# Patient Record
Sex: MALE | Race: WHITE | NOT HISPANIC OR LATINO | Employment: FULL TIME | URBAN - METROPOLITAN AREA
[De-identification: names, ages, dates, MRNs, and addresses within clinical notes are randomized per-mention and may not be internally consistent; named-entity substitution may affect disease eponyms.]

---

## 2017-05-09 ENCOUNTER — OFFICE VISIT (OUTPATIENT)
Dept: FAMILY MEDICINE | Facility: CLINIC | Age: 27
End: 2017-05-09
Payer: COMMERCIAL

## 2017-05-09 VITALS
HEIGHT: 71 IN | BODY MASS INDEX: 25.48 KG/M2 | RESPIRATION RATE: 16 BRPM | HEART RATE: 84 BPM | TEMPERATURE: 98.2 F | WEIGHT: 182 LBS | DIASTOLIC BLOOD PRESSURE: 80 MMHG | SYSTOLIC BLOOD PRESSURE: 120 MMHG | OXYGEN SATURATION: 98 %

## 2017-05-09 DIAGNOSIS — F90.8 ATTENTION-DEFICIT HYPERACTIVITY DISORDER, OTHER TYPE: ICD-10-CM

## 2017-05-09 DIAGNOSIS — D23.5 BENIGN NEOPLASM OF SKIN OF TRUNK, EXCEPT SCROTUM: ICD-10-CM

## 2017-05-09 DIAGNOSIS — Z00.01 ENCOUNTER FOR ROUTINE ADULT HEALTH EXAMINATION WITH ABNORMAL FINDINGS: Primary | ICD-10-CM

## 2017-05-09 PROCEDURE — 99395 PREV VISIT EST AGE 18-39: CPT | Performed by: FAMILY MEDICINE

## 2017-05-09 RX ORDER — DEXTROAMPHETAMINE SACCHARATE, AMPHETAMINE ASPARTATE, DEXTROAMPHETAMINE SULFATE AND AMPHETAMINE SULFATE 7.5; 7.5; 7.5; 7.5 MG/1; MG/1; MG/1; MG/1
30 TABLET ORAL 2 TIMES DAILY
Qty: 60 TABLET | Refills: 0 | Status: SHIPPED | OUTPATIENT
Start: 2017-05-09 | End: 2017-10-20

## 2017-05-09 RX ORDER — DEXTROAMPHETAMINE SACCHARATE, AMPHETAMINE ASPARTATE, DEXTROAMPHETAMINE SULFATE AND AMPHETAMINE SULFATE 7.5; 7.5; 7.5; 7.5 MG/1; MG/1; MG/1; MG/1
30 TABLET ORAL 2 TIMES DAILY
Qty: 60 TABLET | Refills: 0 | Status: SHIPPED | OUTPATIENT
Start: 2017-05-09 | End: 2020-12-02

## 2017-05-09 NOTE — PROGRESS NOTES
SUBJECTIVE:     CC: Roberto Spence is an 26 year old male who presents for preventative health visit.     Healthy Habits:    Do you get at least three servings of calcium containing foods daily (dairy, green leafy vegetables, etc.)? yes    Amount of exercise or daily activities, outside of work: not much day(s) per week    Problems taking medications regularly No    Medication side effects: No    Have you had an eye exam in the past two years? no    Do you see a dentist twice per year? yes    Do you have sleep apnea, excessive snoring or daytime drowsiness?no        PROBLEMS TO ADD ON...    Today's PHQ-2 Score:   PHQ-2 ( 1999 Pf[employer]) 5/25/2016 10/23/2015   Q1: Little interest or pleasure in doing things 0 0   Q2: Feeling down, depressed or hopeless 1 0   PHQ-2 Score 1 0       Abuse: Current or Past(Physical, Sexual or Emotional)- No  Do you feel safe in your environment - Yes    Social History   Substance Use Topics     Smoking status: Never Smoker     Smokeless tobacco: Never Used     Alcohol use 0.0 oz/week     0 Standard drinks or equivalent per week     The patient does not drink >3 drinks per day nor >7 drinks per week.    Last PSA: No results found for: PSA    No results for input(s): CHOL, HDL, LDL, TRIG, CHOLHDLRATIO, NHDL in the last 22497 hours.    Reviewed orders with patient. Reviewed health maintenance and updated orders accordingly - Yes    Reviewed and updated as needed this visit by clinical staff  Tobacco  Allergies  Meds  Problems  Med Hx  Soc Hx        Reviewed and updated as needed this visit by Provider  Meds  Problems            ROS:he continues to find the ADD medicine helpfulwith no real side effects, but in truth it's more that it helps his relationship with his wife as she finds him much more present and focused, and so in truth he probably takes it more for her than anything. He does notice that it helps at work but he did okay before that as well. We talked about his family  "history of heart disease, father had a heart attack in his 50s, trying to find a more physically active lifestyle with at least 10,000 steps a day. I told him he should be able to call in 3 months and get another 3 months worth of his medication and then see one of my partners in 6 months as I will be retired.  C: NEGATIVE for fever, chills, change in weight  I: NEGATIVE for worrisome rashes, moles or lesions  E: NEGATIVE for vision changes or irritation  ENT: NEGATIVE for ear, mouth and throat problems  R: NEGATIVE for significant cough or SOB  CV: NEGATIVE for chest pain, palpitations or peripheral edema  GI: NEGATIVE for nausea, abdominal pain, heartburn, or change in bowel habits   male: negative for dysuria, hematuria, decreased urinary stream, erectile dysfunction, urethral discharge  M: NEGATIVE for significant arthralgias or myalgia  N: NEGATIVE for weakness, dizziness or paresthesias  P: NEGATIVE for changes in mood or affect    Problem list, Medication list, Allergies, and Medical/Social/Surgical histories reviewed in [facility] and updated as appropriate.  OBJECTIVE:     /80 (BP Location: Left arm, Cuff Size: Adult Large)  Pulse 84  Temp 98.2  F (36.8  C) (Oral)  Resp 16  Ht 5' 11\" (1.803 m)  Wt 182 lb (82.6 kg)  SpO2 98%  BMI 25.38 kg/m2  EXAM:  GENERAL: healthy, alert and no distress  EYES: Eyes grossly normal to inspection, PERRL and conjunctivae and sclerae normal  HENT: ear canals and TM's normal, nose and mouth without ulcers or lesions  NECK: no adenopathy, no asymmetry, masses, or scars and thyroid normal to palpation  RESP: lungs clear to auscultation - no rales, rhonchi or wheezes  CV: regular rate and rhythm, normal S1 S2, no S3 or S4, no murmur, click or rub, no peripheral edema and peripheral pulses strong  ABDOMEN: soft, nontender, no hepatosplenomegaly, no masses and bowel sounds normal   (male): normal male genitalia without lesions or urethral discharge, no hernia  MS: no gross " "musculoskeletal defects noted, no edema  SKIN: no suspicious lesions or rashes BUT HE HAS A LARGE REGULAR MOLE MIDDLE BACK WHICH HIS WIFE SAYS MIGHT BE ENLARGING  NEURO: Normal strength and tone, mentation intact and speech normal  PSYCH: mentation appears normal, affect normal/bright    ASSESSMENT/PLAN:     1. Encounter for routine adult health examination with abnormal findings      2. Attention-deficit hyperactivity disorder, other type    - amphetamine-dextroamphetamine (ADDERALL) 30 MG per tablet; Take 1 tablet (30 mg) by mouth 2 times daily  Dispense: 60 tablet; Refill: 0  - amphetamine-dextroamphetamine (ADDERALL) 30 MG per tablet; Take 1 tablet (30 mg) by mouth 2 times daily  Dispense: 60 tablet; Refill: 0  - amphetamine-dextroamphetamine (ADDERALL) 30 MG per tablet; Take 1 tablet (30 mg) by mouth 2 times daily  Dispense: 60 tablet; Refill: 0    3. Benign neoplasm of skin of trunk, except scrotum    - DERMATOLOGY REFERRAL    COUNSELING:  Reviewed preventive health counseling, as reflected in patient instructions       Regular exercise       Healthy diet/nutrition         reports that he has never smoked. He has never used smokeless tobacco.    Estimated body mass index is 25.38 kg/(m^2) as calculated from the following:    Height as of this encounter: 5' 11\" (1.803 m).    Weight as of this encounter: 182 lb (82.6 kg).       Counseling Resources:  ATP IV Guidelines  Pooled Cohorts Equation Calculator  FRAX Risk Assessment  ICSI Preventive Guidelines  Dietary Guidelines for Americans, 2010  USDA's MyPlate  ASA Prophylaxis  Lung CA Screening    Wilner Jordan MD  [facility]  "

## 2017-05-09 NOTE — LETTER
My Depression Action Plan  Name: Roberto Spence   Date of Birth 1990  Date: 5/9/2017    My doctor: Wilner Jordan   My clinic: [facility]  [address] [address] [phone]          GREEN    ZONE   Good Control    What it looks like:     Things are going generally well. You have normal up s and down s. You may even feel depressed from time to time, but bad moods usually last less than a day.   What you need to do:  1. Continue to care for yourself (see self care plan)  2. Check your depression survival kit and update it as needed  3. Follow your physician s recommendations including any medication.  4. Do not stop taking medication unless you consult with your physician first.           YELLOW         ZONE Getting Worse    What it looks like:     Depression is starting to interfere with your life.     It may be hard to get out of bed; you may be starting to isolate yourself from others.    Symptoms of depression are starting to last most all day and this has happened for several days.     You may have suicidal thoughts but they are not constant.   What you need to do:     1. Call your care team, your response to treatment will improve if you keep your care team informed of your progress. Yellow periods are signs an adjustment may need to be made.     2. Continue your self-care, even if you have to fake it!    3. Talk to someone in your support network    4. Open up your depression survival kit           RED    ZONE Medical Alert - Get Help    What it looks like:     Depression is seriously interfering with your life.     You may experience these or other symptoms: You can t get out of bed most days, can t work or engage in other necessary activities, you have trouble taking care of basic hygiene, or basic responsibilities, thoughts of suicide or death that will not go away, self-injurious behavior.     What you need to do:  1. Call your care team and  request a same-day appointment. If they are not available (weekends or after hours) call your local crisis line, emergency room or 911.      Electronically signed by: Romario Berger, May 9, 2017    Depression Self Care Plan / Survival Kit    Self-Care for Depression  Here s the deal. Your body and mind are really not as separate as most people think.  What you do and think affects how you feel and how you feel influences what you do and think. This means if you do things that people who feel good do, it will help you feel better.  Sometimes this is all it takes.  There is also a place for medication and therapy depending on how severe your depression is, so be sure to consult with your medical provider and/ or Behavioral Health Consultant if your symptoms are worsening or not improving.     In order to better manage my stress, I will:    Exercise  Get some form of exercise, every day. This will help reduce pain and release endorphins, the  feel good  chemicals in your brain. This is almost as good as taking antidepressants!  This is not the same as joining a gym and then never going! (they count on that by the way ) It can be as simple as just going for a walk or doing some gardening, anything that will get you moving.      Hygiene   Maintain good hygiene (Get out of bed in the morning, Make your bed, Brush your teeth, Take a shower, and Get dressed like you were going to work, even if you are unemployed).  If your clothes don't fit try to get ones that do.    Diet  I will strive to eat foods that are good for me, drink plenty of water, and avoid excessive sugar, caffeine, alcohol, and other mood-altering substances.  Some foods that are helpful in depression are: complex carbohydrates, B vitamins, flaxseed, fish or fish oil, fresh fruits and vegetables.    Psychotherapy  I agree to participate in Individual Therapy (if recommended).    Medication  If prescribed medications, I agree to take them.  Missing doses  can result in serious side effects.  I understand that drinking alcohol, or other illicit drug use, may cause potential side effects.  I will not stop my medication abruptly without first discussing it with my provider.    Staying Connected With Others  I will stay in touch with my friends, family members, and my primary care provider/team.    Use your imagination  Be creative.  We all have a creative side; it doesn t matter if it s oil painting, sand castles, or mud pies! This will also kick up the endorphins.    Witness Beauty  (AKA stop and smell the roses) Take a look outside, even in mid-winter. Notice colors, textures. Watch the squirrels and birds.     Service to others  Be of service to others.  There is always someone else in need.  By helping others we can  get out of ourselves  and remember the really important things.  This also provides opportunities for practicing all the other parts of the program.    Humor  Laugh and be silly!  Adjust your TV habits for less news and crime-drama and more comedy.    Control your stress  Try breathing deep, massage therapy, biofeedback, and meditation. Find time to relax each day.     My support system    Clinic Contact:  Phone number:    Contact 1:  Phone number:    Contact 2:  Phone number:    Muslim/:  Phone number:    Therapist:  Phone number:    Local crisis center:    Phone number:    Other community support:  Phone number:

## 2017-05-09 NOTE — NURSING NOTE
"Chief Complaint   Patient presents with     Physical     A.D.H.D     initial /80 (BP Location: Left arm, Cuff Size: Adult Large)  Pulse 84  Temp 98.2  F (36.8  C) (Oral)  Resp 16  Ht 5' 11\" (1.803 m)  Wt 182 lb (82.6 kg)  SpO2 98%  BMI 25.38 kg/m2 Estimated body mass index is 25.38 kg/(m^2) as calculated from the following:    Height as of this encounter: 5' 11\" (1.803 m).    Weight as of this encounter: 182 lb (82.6 kg).  BP completed using cuff size: large.  L arm      Health Maintenance that is potentially due pending provider review:  NONE    n/a    Romario Berger ma  "

## 2017-05-09 NOTE — MR AVS SNAPSHOT
After Visit Summary   5/9/2017    Roberto Spence    MRN: [id number]           Patient Information     Date Of Birth          1990        Visit Information        Provider Department      5/9/2017 6:30 PM Wilner Jordan MD [facility]        Today's Diagnoses     Encounter for routine adult health examination with abnormal findings    -  1    Attention-deficit hyperactivity disorder, other type        Benign neoplasm of skin of trunk, except scrotum          Care Instructions      Preventive Health Recommendations  Male Ages 26 - 39    Yearly exam:             See your health care provider every year in order to  o   Review health changes.   o   Discuss preventive care.    o   Review your medicines if your doctor has prescribed any.    You should be tested each year for STDs (sexually transmitted diseases), if you re at risk.     After age 35, talk to your provider about cholesterol testing. If you are at risk for heart disease, have your cholesterol tested at least every 5 years.     If you are at risk for diabetes, you should have a diabetes test (fasting glucose).  Shots: Get a flu shot each year. Get a tetanus shot every 10 years.     Nutrition:    Eat at least 5 servings of fruits and vegetables daily.     Eat whole-grain bread, whole-wheat pasta and brown rice instead of white grains and rice.     Talk to your provider about Calcium and Vitamin D.     Lifestyle    Exercise for at least 150 minutes a week (30 minutes a day, 5 days a week). This will help you control your weight and prevent disease.     Limit alcohol to one drink per day.     No smoking.     Wear sunscreen to prevent skin cancer.     See your dentist every six months for an exam and cleaning.           Follow-ups after your visit        Additional Services     DERMATOLOGY REFERRAL       Your provider has referred you to: [facility] [phone]    [email or web address]    Please be aware that coverage of these services is subject to the terms and limitations of your health insurance plan.  Call member services at your health plan with any benefit or coverage questions.      Please bring the following with you to your appointment:    (1) Any X-Rays, CTs or MRIs which have been performed.  Contact the facility where they were done to arrange for  prior to your scheduled appointment.    (2) List of current medications  (3) This referral request   (4) Any documents/labs given to you for this referral                  Who to contact     If you have questions or need follow up information about today's clinic visit or your schedule please contact [facility] directly at [phone].  Normal or non-critical lab and imaging results will be communicated to you by MyChart, letter or phone within 4 business days after the clinic has received the results. If you do not hear from us within 7 days, please contact the clinic through [employer] or phone. If you have a critical or abnormal lab result, we will notify you by phone as soon as possible.  Submit refill requests through [employer] or call your pharmacy and they will forward the refill request to us. Please allow 3 business days for your refill to be completed.          Additional Information About Your Visit        [employer] Information     [employer] gives you secure access to your electronic health record. If you see a primary care provider, you can also send messages to your care team and make appointments. If you have questions, please call your primary care clinic.  If you do not have a primary care provider, please call [phone] and they will assist you.        Care EveryWhere ID     This is your Care EveryWhere ID. This could be used by other organizations to access your [facility] medical records  [id number]        Your Vitals Were     Pulse Temperature Respirations Height Pulse  "Oximetry BMI (Body Mass Index)    84 98.2  F (36.8  C) (Oral) 16 5' 11\" (1.803 m) 98% 25.38 kg/m2       Blood Pressure from Last 3 Encounters:   05/09/17 120/80   11/16/16 138/82   08/09/16 120/80    Weight from Last 3 Encounters:   05/09/17 182 lb (82.6 kg)   11/16/16 181 lb 12.8 oz (82.5 kg)   08/09/16 175 lb (79.4 kg)              We Performed the Following     DERMATOLOGY REFERRAL          Today's Medication Changes          These changes are accurate as of: 5/9/17  6:59 PM.  If you have any questions, ask your nurse or doctor.               These medicines have changed or have updated prescriptions.        Dose/Directions    * amphetamine-dextroamphetamine 30 MG per tablet   Commonly known as:  ADDERALL   This may have changed:  Another medication with the same name was removed. Continue taking this medication, and follow the directions you see here.   Used for:  Attention-deficit hyperactivity disorder, other type   Changed by:  Wilner Jordan MD        Dose:  30 mg   Take 1 tablet (30 mg) by mouth 2 times daily   Quantity:  60 tablet   Refills:  0       * amphetamine-dextroamphetamine 30 MG per tablet   Commonly known as:  ADDERALL   This may have changed:  Another medication with the same name was removed. Continue taking this medication, and follow the directions you see here.   Used for:  Attention-deficit hyperactivity disorder, other type   Changed by:  Wilner Jordan MD        Dose:  30 mg   Take 1 tablet (30 mg) by mouth 2 times daily   Quantity:  60 tablet   Refills:  0       * amphetamine-dextroamphetamine 30 MG per tablet   Commonly known as:  ADDERALL   This may have changed:  Another medication with the same name was removed. Continue taking this medication, and follow the directions you see here.   Used for:  Attention-deficit hyperactivity disorder, other type   Changed by:  Wilner Jordan MD        Dose:  30 mg   Take 1 tablet (30 mg) by mouth 2 times daily   Quantity:  60 tablet "   Refills:  0       * Notice:  This list has 3 medication(s) that are the same as other medications prescribed for you. Read the directions carefully, and ask your doctor or other care provider to review them with you.         Where to get your medicines      Some of these will need a paper prescription and others can be bought over the counter.  Ask your nurse if you have questions.     Bring a paper prescription for each of these medications     amphetamine-dextroamphetamine 30 MG per tablet    amphetamine-dextroamphetamine 30 MG per tablet    amphetamine-dextroamphetamine 30 MG per tablet                Primary Care Provider Office Phone # Fax #    Wilner Jordan [phone]       [facility] [address] [address]        Thank you!     Thank you for choosing [facility]  for your care. Our goal is always to provide you with excellent care. Hearing back from our patients is one way we can continue to improve our services. Please take a few minutes to complete the written survey that you may receive in the mail after your visit with us. Thank you!             Your Updated Medication List - Protect others around you: Learn how to safely use, store and throw away your medicines at www.disposemymeds.org.          This list is accurate as of: 5/9/17  6:59 PM.  Always use your most recent med list.                   Brand Name Dispense Instructions for use    * amphetamine-dextroamphetamine 30 MG per tablet    ADDERALL    60 tablet    Take 1 tablet (30 mg) by mouth 2 times daily       * amphetamine-dextroamphetamine 30 MG per tablet    ADDERALL    60 tablet    Take 1 tablet (30 mg) by mouth 2 times daily       * amphetamine-dextroamphetamine 30 MG per tablet    ADDERALL    60 tablet    Take 1 tablet (30 mg) by mouth 2 times daily       buPROPion 300 MG 24 hr tablet    WELLBUTRIN XL    90 tablet    Take 1 tablet (300 mg) by mouth every morning       *  Notice:  This list has 3 medication(s) that are the same as other medications prescribed for you. Read the directions carefully, and ask your doctor or other care provider to review them with you.

## 2017-05-10 ASSESSMENT — PATIENT HEALTH QUESTIONNAIRE - PHQ9: SUM OF ALL RESPONSES TO PHQ QUESTIONS 1-9: 1

## 2017-10-20 ENCOUNTER — HOSPITAL ENCOUNTER (INPATIENT)
Facility: CLINIC | Age: 27
LOS: 2 days | Discharge: HOME | DRG: 813 | End: 2017-10-22
Attending: EMERGENCY MEDICINE | Admitting: INTERNAL MEDICINE
Payer: COMMERCIAL

## 2017-10-20 DIAGNOSIS — K90.0 CELIAC DISEASE: ICD-10-CM

## 2017-10-20 DIAGNOSIS — D69.6 THROMBOCYTOPENIA (H): ICD-10-CM

## 2017-10-20 LAB
ABO + RH BLD: NORMAL
ABO + RH BLD: NORMAL
ALBUMIN SERPL-MCNC: 4.2 G/DL (ref 3.4–5)
ALP SERPL-CCNC: 93 U/L (ref 40–150)
ALT SERPL W P-5'-P-CCNC: 112 U/L (ref 0–70)
ANION GAP SERPL CALCULATED.3IONS-SCNC: 7 MMOL/L (ref 3–14)
APTT PPP: 26 SEC (ref 22–37)
AST SERPL W P-5'-P-CCNC: 58 U/L (ref 0–45)
BASOPHILS # BLD AUTO: 0 10E9/L (ref 0–0.2)
BASOPHILS NFR BLD AUTO: 0.9 %
BILIRUB SERPL-MCNC: 0.5 MG/DL (ref 0.2–1.3)
BLD GP AB SCN SERPL QL: NORMAL
BLOOD BANK CMNT PATIENT-IMP: NORMAL
BUN SERPL-MCNC: 15 MG/DL (ref 7–30)
CALCIUM SERPL-MCNC: 8.8 MG/DL (ref 8.5–10.1)
CHLORIDE SERPL-SCNC: 107 MMOL/L (ref 94–109)
CO2 SERPL-SCNC: 27 MMOL/L (ref 20–32)
CREAT SERPL-MCNC: 0.94 MG/DL (ref 0.66–1.25)
CRP SERPL-MCNC: 9.9 MG/L (ref 0–8)
D DIMER PPP FEU-MCNC: <0.3 UG/ML FEU (ref 0–0.5)
DIFFERENTIAL METHOD BLD: ABNORMAL
EOSINOPHIL # BLD AUTO: 0.2 10E9/L (ref 0–0.7)
EOSINOPHIL NFR BLD AUTO: 4.3 %
ERYTHROCYTE [DISTWIDTH] IN BLOOD BY AUTOMATED COUNT: 13.5 % (ref 10–15)
ERYTHROCYTE [SEDIMENTATION RATE] IN BLOOD BY WESTERGREN METHOD: 1 MM/H (ref 0–15)
FIBRINOGEN PPP-MCNC: 285 MG/DL (ref 200–420)
GFR SERPL CREATININE-BSD FRML MDRD: >90 ML/MIN/1.7M2
GLUCOSE SERPL-MCNC: 102 MG/DL (ref 70–99)
HCT VFR BLD AUTO: 48.1 % (ref 40–53)
HGB BLD-MCNC: 16.5 G/DL (ref 13.3–17.7)
IMM GRANULOCYTES # BLD: 0 10E9/L (ref 0–0.4)
IMM GRANULOCYTES NFR BLD: 0.9 %
INR PPP: 0.92 (ref 0.86–1.14)
LYMPHOCYTES # BLD AUTO: 1.7 10E9/L (ref 0.8–5.3)
LYMPHOCYTES NFR BLD AUTO: 38.6 %
MCH RBC QN AUTO: 29.8 PG (ref 26.5–33)
MCHC RBC AUTO-ENTMCNC: 34.3 G/DL (ref 31.5–36.5)
MCV RBC AUTO: 87 FL (ref 78–100)
MONOCYTES # BLD AUTO: 0.6 10E9/L (ref 0–1.3)
MONOCYTES NFR BLD AUTO: 12.4 %
NEUTROPHILS # BLD AUTO: 1.9 10E9/L (ref 1.6–8.3)
NEUTROPHILS NFR BLD AUTO: 42.9 %
NRBC # BLD AUTO: ABNORMAL 10*3/UL
NRBC BLD AUTO-RTO: ABNORMAL /100
PLATELET # BLD AUTO: 1 10E9/L (ref 150–450)
PLATELET # BLD EST: ABNORMAL 10*3/UL
POTASSIUM SERPL-SCNC: 3.9 MMOL/L (ref 3.4–5.3)
PROT SERPL-MCNC: 6.9 G/DL (ref 6.8–8.8)
RBC # BLD AUTO: 5.53 10E12/L (ref 4.4–5.9)
RETICS # AUTO: 95.1 10E9/L (ref 25–95)
RETICS/RBC NFR AUTO: 1.7 % (ref 0.5–2)
SODIUM SERPL-SCNC: 141 MMOL/L (ref 133–144)
SPECIMEN EXP DATE BLD: NORMAL
WBC # BLD AUTO: 4.4 10E9/L (ref 4–11)

## 2017-10-20 PROCEDURE — 85384 FIBRINOGEN ACTIVITY: CPT | Performed by: EMERGENCY MEDICINE

## 2017-10-20 PROCEDURE — 40000611 ZZHCL STATISTIC MORPHOLOGY W/INTERP HEMEPATH TC 85060: Performed by: EMERGENCY MEDICINE

## 2017-10-20 PROCEDURE — 25000125 ZZHC RX 250: Performed by: EMERGENCY MEDICINE

## 2017-10-20 PROCEDURE — 85045 AUTOMATED RETICULOCYTE COUNT: CPT | Performed by: EMERGENCY MEDICINE

## 2017-10-20 PROCEDURE — 85730 THROMBOPLASTIN TIME PARTIAL: CPT | Performed by: EMERGENCY MEDICINE

## 2017-10-20 PROCEDURE — 86140 C-REACTIVE PROTEIN: CPT | Performed by: EMERGENCY MEDICINE

## 2017-10-20 PROCEDURE — 99285 EMERGENCY DEPT VISIT HI MDM: CPT | Mod: 25 | Performed by: EMERGENCY MEDICINE

## 2017-10-20 PROCEDURE — 30233S1 TRANSFUSION OF NONAUTOLOGOUS GLOBULIN INTO PERIPHERAL VEIN, PERCUTANEOUS APPROACH: ICD-10-PCS | Performed by: INTERNAL MEDICINE

## 2017-10-20 PROCEDURE — 86900 BLOOD TYPING SEROLOGIC ABO: CPT | Performed by: EMERGENCY MEDICINE

## 2017-10-20 PROCEDURE — 85379 FIBRIN DEGRADATION QUANT: CPT | Performed by: EMERGENCY MEDICINE

## 2017-10-20 PROCEDURE — 99222 1ST HOSP IP/OBS MODERATE 55: CPT | Mod: AI | Performed by: INTERNAL MEDICINE

## 2017-10-20 PROCEDURE — 80053 COMPREHEN METABOLIC PANEL: CPT | Performed by: EMERGENCY MEDICINE

## 2017-10-20 PROCEDURE — 12000008 ZZH R&B INTERMEDIATE UMMC

## 2017-10-20 PROCEDURE — 86850 RBC ANTIBODY SCREEN: CPT | Performed by: EMERGENCY MEDICINE

## 2017-10-20 PROCEDURE — 85652 RBC SED RATE AUTOMATED: CPT | Performed by: EMERGENCY MEDICINE

## 2017-10-20 PROCEDURE — 85025 COMPLETE CBC W/AUTO DIFF WBC: CPT | Performed by: EMERGENCY MEDICINE

## 2017-10-20 PROCEDURE — 85610 PROTHROMBIN TIME: CPT | Performed by: EMERGENCY MEDICINE

## 2017-10-20 PROCEDURE — 96374 THER/PROPH/DIAG INJ IV PUSH: CPT | Performed by: EMERGENCY MEDICINE

## 2017-10-20 PROCEDURE — 99285 EMERGENCY DEPT VISIT HI MDM: CPT | Mod: Z6 | Performed by: EMERGENCY MEDICINE

## 2017-10-20 PROCEDURE — 86901 BLOOD TYPING SEROLOGIC RH(D): CPT | Performed by: EMERGENCY MEDICINE

## 2017-10-20 RX ORDER — PREDNISONE 20 MG/1
80 TABLET ORAL ONCE
Status: COMPLETED | OUTPATIENT
Start: 2017-10-20 | End: 2017-10-20

## 2017-10-20 RX ORDER — PANTOPRAZOLE SODIUM 40 MG/1
40 TABLET, DELAYED RELEASE ORAL EVERY MORNING
Status: DISCONTINUED | OUTPATIENT
Start: 2017-10-21 | End: 2017-10-22 | Stop reason: HOSPADM

## 2017-10-20 RX ORDER — BUPROPION HYDROCHLORIDE 300 MG/1
300 TABLET ORAL EVERY MORNING
Status: DISCONTINUED | OUTPATIENT
Start: 2017-10-21 | End: 2017-10-22 | Stop reason: HOSPADM

## 2017-10-20 RX ORDER — ACETAMINOPHEN 325 MG/1
650 TABLET ORAL EVERY 4 HOURS PRN
Status: DISCONTINUED | OUTPATIENT
Start: 2017-10-20 | End: 2017-10-22 | Stop reason: HOSPADM

## 2017-10-20 RX ADMIN — PANTOPRAZOLE SODIUM 40 MG: 40 INJECTION, POWDER, FOR SOLUTION INTRAVENOUS at 19:20

## 2017-10-20 RX ADMIN — PREDNISONE 80 MG: 20 TABLET ORAL at 19:19

## 2017-10-20 ASSESSMENT — ENCOUNTER SYMPTOMS
ABDOMINAL PAIN: 0
NAUSEA: 0
CONSTIPATION: 0
CHILLS: 0
BLOOD IN STOOL: 0
FEVER: 0
VOMITING: 0
DIARRHEA: 0

## 2017-10-20 NOTE — ED PROVIDER NOTES
History     Chief Complaint   Patient presents with     Abnormal Labs     HPI  Roberto Spence is a 27 year old male with a history of celiac disease who presents to the ED from clinic for evaluation of abnormal labs, having been found to have a PLT count of 3 at clinic today.    Patient reports having a 2 day history of bloody/oozing sores in his mouth and then petechiae developing on his extremities throughout the day today, first the lower extremities, then the upper extremities. Has never had anything like this before. Has had PLT level of 50-80's here recently, but has been uptrending/improving. Denies family history of any clotting/bleeding disorders.    Denies fevers, chills, and abdominal pain. Denies urinary symptoms and/or change in bowels. Denies nausea, vomiting. Denies recent medication changes. Denies usage of alcohol and marijuana, no drugs. No falls or traumas. No HAs or neuro symptoms. No CP or shortness of breath. No recent illnesses. No viral syndromes/diagnoses. No other new symptoms or complaints at this time.       Patient's PCP is Wilner Feliz family medicine MD at [facility].    I have reviewed the Medications, Allergies, Past Medical and Surgical History, and Social History in the [facility] system.    Past Medical History:   Diagnosis Date     Celiac disease 06/10/2015     Platelets decreased (H) 06/10/2015    lower than average per patient report       No past surgical history on file.    Family History   Problem Relation Age of Onset     Coronary Artery Disease Father      Thyroid Disease Father      Known Genetic Syndrome Brother      autisism     Chemical Addiction Paternal Grandfather      Family History Negative Mother      DIABETES No family hx of      Hypertension No family hx of      Hyperlipidemia No family hx of      CEREBROVASCULAR DISEASE No family hx of      Breast Cancer No family hx of      Colon Cancer No family hx of      Prostate Cancer No family hx of       "Other Cancer No family hx of      Depression No family hx of      Anxiety Disorder No family hx of      MENTAL ILLNESS No family hx of      Substance Abuse No family hx of      Anesthesia Reaction No family hx of      Asthma No family hx of      OSTEOPOROSIS No family hx of      Genetic Disorder No family hx of      Obesity No family hx of      Unknown/Adopted No family hx of        Social History   Substance Use Topics     Smoking status: Never Smoker     Smokeless tobacco: Never Used     Alcohol use 0.0 oz/week     0 Standard drinks or equivalent per week       No current facility-administered medications for this encounter.      Current Outpatient Prescriptions   Medication     amphetamine-dextroamphetamine (ADDERALL) 30 MG per tablet     buPROPion (WELLBUTRIN XL) 300 MG 24 hr tablet          Allergies   Allergen Reactions     No Known Allergies      Nsaids Unknown     Has low platelets       Review of Systems   Constitutional: Negative for chills and fever.   HENT: Positive for mouth sores and nosebleeds. Negative for sore throat and trouble swallowing.    Respiratory: Negative for cough and shortness of breath.    Gastrointestinal: Negative for abdominal pain, blood in stool, constipation, diarrhea, nausea and vomiting.   Genitourinary: Negative for dysuria, frequency and hematuria.   Musculoskeletal: Negative for arthralgias and myalgias.   Skin: Positive for rash (petechiae) and wound (oozing mouth sores).   Allergic/Immunologic: Negative for immunocompromised state.   Neurological: Negative for weakness, light-headedness, numbness and headaches.   Hematological: Negative for adenopathy.   All other systems reviewed and are negative.      Physical Exam   BP: (!) 131/100  Pulse: 85  Temp: 98.1  F (36.7  C)  Resp: 16  Height: 180.3 cm (5' 11\")  Weight: 78.5 kg (173 lb)  SpO2: 97 %      Physical Exam  CONSTITUTIONAL: Well-developed and well-nourished. Awake and alert. Non-toxic appearance. No acute distress. "   HENT:   - Head: Normocephalic and atraumatic.   - Ears: Hearing and external ear grossly normal.   - Nose: Nose normal. No rhinorrhea. No active epistaxis.   - Mouth/Throat: Oropharynx is clear and MMM  EYES: Conjunctivae and lids are normal. No scleral icterus.   NECK: Normal range of motion and phonation normal. Neck supple.  No tracheal deviation, no stridor. No edema or erythema noted.  CARDIOVASCULAR: Normal rate, regular rhythm and no appreciable abnormal heart sounds.  PULMONARY/CHEST: Effort normal. No accessory muscle usage or stridor. No respiratory distress.  No appreciable abnormal breath sounds.  ABDOMEN: Soft, non-distended. No tenderness. No rigidity, rebound or guarding.   MUSCULOSKELETAL: Extremities warm and seemingly well perfused. No edema or calf tenderness.  NEUROLOGIC: Awake, alert. Not disoriented. He displays no atrophy and no tremor. Normal tone. No seizure activity. Coordination normal. GCS 15  SKIN: Skin is warm and dry. Peripheral petechiae w/o purpura  (LE > RE). No diaphoresis. No pallor. No skin sloughing. No bullae, no raised lesions  PSYCHIATRIC: Normal mood and affect. Speech and behavior normal. Thought processes linear. Cognition and memory are normal.     ED Course     ED Course   Comment Time   Called Elisabet, awaiting call back 10/20 1821   Discussed with Heme fellow. They will discuss w/ their staff and call us back. They are having us hold off on transfusing platelets currently. 10/20 1833   I was called back by Hematology.  They have discussed with her staff.  They have asked us again to please not give platelets.  But to start high-dose steroids (80 mg prednisone orally), and give a dose of PPI. 10/20 1902     Procedures               Labs Ordered and Resulted from Time of ED Arrival Up to the Time of Departure from the ED - No data to display         Assessments & Plan (with Medical Decision Making)   IMPRESSION:   27-year-old, generally healthy male outside of some celiac  disease and some nonspecific midrange thrombocytopenia (50-80s), presents today after having been found to have incredibly low platelets at OSH clinic (platelet level III), with preceding symptoms of bleeding mouth wounds, mild epistaxis and peripheral petechia over the last 2 days. Clinically, patient appears nontoxic, NAD.  Vitals within normal limits.  He does have petechia on exam, worse over the distal lower extremities, less so over the distal upper extremities. No obvious active bleeding currently.    DDx for severe thrombocytopenia, includes ITP, TTP, less likely HUS. Not on heparin, no clear viral syndrome of symptoms or known hx.     PLAN: We will obtain some repeat blood studies here including a peripheral smear.  I will discuss this with Hematology and I have already called them and I am awaiting a call back for additional assistance.  - Will discuss whether or not pt would need to be transfused platelets or if they would like us to hold off on transfusing, and instead use steroids IVIG.    RESULTS:  - Labs: platelet count 1, CRP 9.9, , AST 58, % reticulocyte count 1.7, absolute reticulocyte count 95.1. INR normal at 0.92, PTT 26, fibrinogen activity pending  --- See ED Course section above for particular pertinent findings and comments  - Urine: No sample yet provided    INTERVENTIONS:   - PO prednisone (80 mg x1), dosing and routes recommended by Hematology. They did not want us to be use IV steroids in my discussions with them. They again reiterated that they do not want us transfusing platelets. They will place other treatment orders as needed, such as IVIG, etc.  - IV Protonix     DISCUSSIONS:  - w/ Hem-Onc: See above. They will admit, having us start on PO steroids and IV PPI, and they will place the rest of the orders.   - w/ Patient: I have reviewed the available findings, plan with the patient. He expressed understanding and agreement with this plan. All questions answered to the best of  our ability at this time.     DISPOSITION/PLANNING:  - FINAL IMPRESSION: Severe thrombocytopenia  (Exact etiology TBD)  - DISPOSITION: Admit to Hematology service for further evaluation/management.  --- Pending: Blood morphology/peripheral smear, fibrinogen activity, d-dimer, ESR, further recommendations from Hematology        ______________________________________________________________________________    - I have reviewed the available nursing notes.            New Prescriptions    No medications on file       Final diagnoses:   None   Lizzy BILLINGSLEY, am serving as a trained medical scribe to document services personally performed by Astrid Robbins MD, based on the provider's statements to me.    Astrid BILLINGSLEY MD, was physically present and have reviewed and verified the accuracy of this note documented by Lizzy Cohen.     10/20/2017   [facility], [place], EMERGENCY DEPARTMENT     Astrid Robbins MD  10/22/17 0024

## 2017-10-20 NOTE — IP AVS SNAPSHOT
MRN:[id number]                      After Visit Summary   10/20/2017    Roberto Spence    MRN: [id number]           Thank you!     Thank you for choosing [facility] for your care. Our goal is always to provide you with excellent care. Hearing back from our patients is one way we can continue to improve our services. Please take a few minutes to complete the written survey that you may receive in the mail after you visit with us. Thank you!        Patient Information     Date Of Birth          1990        Designated Caregiver       Most Recent Value    Caregiver    Will someone help with your care after discharge? yes    Name of designated caregiver Shellye    Phone number of caregiver [phone]    Caregiver address [facility]      About your hospital stay     You were admitted on:  October 20, 2017 You last received care in the:  [address] [facility] [facility]    You were discharged on:  October 22, 2017        Reason for your hospital stay       You were hospitalized for thrombocytopenia and petechiae.                  Who to Call     For medical emergencies, please call 911.  For non-urgent questions about your medical care, please call your primary care provider or clinic, [phone]          Attending Provider     Provider Specialty    Astrid Robbins MD Emergency Medicine    [facility]       Primary Care Provider Office Phone # Fax #    Wilner Feliz [phone]       When to contact your care team       Bloody stool or black stool.   Significant worsening bleeding  Headache                  After Care Instructions     Activity       Your activity upon discharge: would avoid significant physical activity particularly involving contact until platelet count is above 50.            Diet       Follow this diet upon discharge: regular            Discharge Instructions       Follow up appointment 10/31 and check-in time with Alka Benson from hematology  at 2:25 pm. She is very good. She is one of the physician assistants who is very good.    We would like have you get you labs checked twice next week. If you can get your labs checked on Monday and Thursday or Friday. You can have them forwarded to [facility] fax: [phone]. You can have as attention to Alka Benson and Dr. Wright.     [facility] main line: [phone]  [facility] triage: [phone] (available 24/7)    Alka Benson and Dr. Wright will determine taper of your steroids. I will send you out with 80 mg daily. We will send you out with prilosec to prevent stomach irritation on steroids.                  Follow-up Appointments     Adult [facility] Follow-up and recommended labs and tests       Follow up appointment 10/31 and check-in time with Alka Benson from hematology at 2:25 pm. CBC with differential and BMP.     Appointments on [place] and/or [facility] (with [facility] or [facility] provider or service). Call [phone] if you haven't heard regarding these appointments within 7 days of discharge.                  Your next 10 appointments already scheduled     Oct 31, 2017  2:40 PM CDT   (Arrive by 2:25 PM)   Return Visit with SIENNA Keys   [facility] ([facility])    [address] [place] [id number] [phone]              Pending Results     Date and Time Order Name Status Description    10/21/2017 0717 Platelets prepare order unit conditional In process     10/21/2017 0604 Hepatitis B surface antigen In process     10/21/2017 0604 Hepatitis B core antibody In process     10/21/2017 0604 Hepatitis A antibody IgM In process     10/21/2017 0604 Hepatitis B Surface Antibody In process     10/21/2017 0604 Immunoglobulins A G and M In process     10/20/2017 2141 Hepatits C antibody In process     10/20/2017 2141 HIV Antigen Antibody Combo In process     10/20/2017 1818 Blood Morphology Pathologist Review  "In process             Statement of Approval     Ordered          10/22/17 1247  I have reviewed and agree with all the recommendations and orders detailed in this document.  EFFECTIVE NOW     Approved and electronically signed by:  Wilner Monteiro MD             Admission Information     Date & Time Provider Department Dept. Phone    10/20/2017 Carie Burden MD [address] [facility] [phone]      Your Vitals Were     Blood Pressure Pulse Temperature Respirations Height Weight    147/79 (BP Location: Left arm) 79 97.6  F (36.4  C) (Oral) 16 1.803 m (5' 11\") 78.2 kg (172 lb 6.4 oz)    Pulse Oximetry BMI (Body Mass Index)                99% 24.04 kg/m2          MyChart Information     [employer] gives you secure access to your electronic health record. If you see a primary care provider, you can also send messages to your care team and make appointments. If you have questions, please call your primary care clinic.  If you do not have a primary care provider, please call [phone] and they will assist you.        Care EveryWhere ID     This is your Care EveryWhere ID. This could be used by other organizations to access your [facility] medical records  [id number]        Equal Access to Services     BARBARA SOLER AH: Hadii corey De Leon, wabroda marshal, qaybta kaalmada randolph, delisa charles. So [facility] [phone].    ATENCIÓN: Si habla español, tiene a khan disposición servicios gratuitos de asistencia lingüística. Nargis al [phone].    We comply with applicable federal civil rights laws and [place] laws. We do not discriminate on the basis of race, color, national origin, age, disability, sex, sexual orientation, or gender identity.               Review of your medicines      START taking        Dose / Directions    omeprazole 20 MG CR capsule   Commonly known as:  priLOSEC   Used for:  Thrombocytopenia (H)        Dose:  20 mg   Take 1 capsule (20 mg) by mouth daily "   Quantity:  15 capsule   Refills:  3       predniSONE 20 MG tablet   Commonly known as:  DELTASONE   Used for:  Thrombocytopenia (H)        Dose:  80 mg   Start taking on:  10/23/2017   Take 4 tablets (80 mg) by mouth daily   Quantity:  50 tablet   Refills:  0         CONTINUE these medicines which have NOT CHANGED        Dose / Directions    amphetamine-dextroamphetamine 30 MG per tablet   Commonly known as:  ADDERALL   Used for:  Attention-deficit hyperactivity disorder, other type        Dose:  30 mg   Take 1 tablet (30 mg) by mouth 2 times daily   Quantity:  60 tablet   Refills:  0       buPROPion 300 MG 24 hr tablet   Commonly known as:  WELLBUTRIN XL   Used for:  Major depressive disorder, single episode, mild (H)        Dose:  300 mg   Take 1 tablet (300 mg) by mouth every morning   Quantity:  90 tablet   Refills:  3            Where to get your medicines      These medications were sent to [place] [id number] [place] [address]  [address] [facility] [address]     Phone:  [phone]     omeprazole 20 MG CR capsule    predniSONE 20 MG tablet                Protect others around you: Learn how to safely use, store and throw away your medicines at www.disposemymeds.org.             Medication List: This is a list of all your medications and when to take them. Check marks below indicate your daily home schedule. Keep this list as a reference.      Medications           Morning Afternoon Evening Bedtime As Needed    amphetamine-dextroamphetamine 30 MG per tablet   Commonly known as:  ADDERALL   Take 1 tablet (30 mg) by mouth 2 times daily                                buPROPion 300 MG 24 hr tablet   Commonly known as:  WELLBUTRIN XL   Take 1 tablet (300 mg) by mouth every morning   Last time this was given:  300 mg on 10/22/2017  7:50 AM                                omeprazole 20 MG CR capsule   Commonly known as:  priLOSEC   Take 1 capsule (20 mg) by mouth daily                                 predniSONE 20 MG tablet   Commonly known as:  DELTASONE   Take 4 tablets (80 mg) by mouth daily   Start taking on:  10/23/2017   Last time this was given:  80 mg on 10/22/2017 11:42 AM

## 2017-10-21 ENCOUNTER — APPOINTMENT (OUTPATIENT)
Dept: CT IMAGING | Facility: CLINIC | Age: 27
DRG: 813 | End: 2017-10-21
Attending: INTERNAL MEDICINE
Payer: COMMERCIAL

## 2017-10-21 LAB
BASOPHILS # BLD AUTO: 0 10E9/L (ref 0–0.2)
BASOPHILS NFR BLD AUTO: 0 %
DIFFERENTIAL METHOD BLD: ABNORMAL
EOSINOPHIL # BLD AUTO: 0 10E9/L (ref 0–0.7)
EOSINOPHIL NFR BLD AUTO: 0 %
ERYTHROCYTE [DISTWIDTH] IN BLOOD BY AUTOMATED COUNT: 13.2 % (ref 10–15)
HCT VFR BLD AUTO: 46.5 % (ref 40–53)
HGB BLD-MCNC: 15.9 G/DL (ref 13.3–17.7)
IMM GRANULOCYTES # BLD: 0 10E9/L (ref 0–0.4)
IMM GRANULOCYTES NFR BLD: 0.4 %
LYMPHOCYTES # BLD AUTO: 1.3 10E9/L (ref 0.8–5.3)
LYMPHOCYTES NFR BLD AUTO: 24.2 %
MCH RBC QN AUTO: 29.2 PG (ref 26.5–33)
MCHC RBC AUTO-ENTMCNC: 34.2 G/DL (ref 31.5–36.5)
MCV RBC AUTO: 85 FL (ref 78–100)
MONOCYTES # BLD AUTO: 0.4 10E9/L (ref 0–1.3)
MONOCYTES NFR BLD AUTO: 8.2 %
NEUTROPHILS # BLD AUTO: 3.5 10E9/L (ref 1.6–8.3)
NEUTROPHILS NFR BLD AUTO: 67.2 %
NRBC # BLD AUTO: 0 10*3/UL
NRBC BLD AUTO-RTO: 0 /100
PLATELET # BLD AUTO: 1 10E9/L (ref 150–450)
RBC # BLD AUTO: 5.45 10E12/L (ref 4.4–5.9)
WBC # BLD AUTO: 5.2 10E9/L (ref 4–11)

## 2017-10-21 PROCEDURE — 86706 HEP B SURFACE ANTIBODY: CPT | Performed by: INTERNAL MEDICINE

## 2017-10-21 PROCEDURE — 82784 ASSAY IGA/IGD/IGG/IGM EACH: CPT | Performed by: INTERNAL MEDICINE

## 2017-10-21 PROCEDURE — 25000132 ZZH RX MED GY IP 250 OP 250 PS 637: Performed by: INTERNAL MEDICINE

## 2017-10-21 PROCEDURE — 86709 HEPATITIS A IGM ANTIBODY: CPT | Performed by: INTERNAL MEDICINE

## 2017-10-21 PROCEDURE — 87340 HEPATITIS B SURFACE AG IA: CPT | Performed by: INTERNAL MEDICINE

## 2017-10-21 PROCEDURE — 70450 CT HEAD/BRAIN W/O DYE: CPT

## 2017-10-21 PROCEDURE — 86803 HEPATITIS C AB TEST: CPT | Performed by: INTERNAL MEDICINE

## 2017-10-21 PROCEDURE — 36415 COLL VENOUS BLD VENIPUNCTURE: CPT | Performed by: INTERNAL MEDICINE

## 2017-10-21 PROCEDURE — 25000131 ZZH RX MED GY IP 250 OP 636 PS 637: Performed by: PHYSICIAN ASSISTANT

## 2017-10-21 PROCEDURE — 25000128 H RX IP 250 OP 636: Performed by: INTERNAL MEDICINE

## 2017-10-21 PROCEDURE — 25000125 ZZHC RX 250: Performed by: PHYSICIAN ASSISTANT

## 2017-10-21 PROCEDURE — 86704 HEP B CORE ANTIBODY TOTAL: CPT | Performed by: INTERNAL MEDICINE

## 2017-10-21 PROCEDURE — 87389 HIV-1 AG W/HIV-1&-2 AB AG IA: CPT | Performed by: INTERNAL MEDICINE

## 2017-10-21 PROCEDURE — 12000008 ZZH R&B INTERMEDIATE UMMC

## 2017-10-21 PROCEDURE — 85025 COMPLETE CBC W/AUTO DIFF WBC: CPT | Performed by: INTERNAL MEDICINE

## 2017-10-21 RX ORDER — DIPHENHYDRAMINE HYDROCHLORIDE 50 MG/ML
50 INJECTION INTRAMUSCULAR; INTRAVENOUS
Status: DISCONTINUED | OUTPATIENT
Start: 2017-10-21 | End: 2017-10-22 | Stop reason: HOSPADM

## 2017-10-21 RX ORDER — DIPHENHYDRAMINE HCL 50 MG
50 CAPSULE ORAL
Status: DISCONTINUED | OUTPATIENT
Start: 2017-10-21 | End: 2017-10-22 | Stop reason: HOSPADM

## 2017-10-21 RX ORDER — DIPHENHYDRAMINE HCL 50 MG
50 CAPSULE ORAL EVERY 24 HOURS
Status: COMPLETED | OUTPATIENT
Start: 2017-10-21 | End: 2017-10-22

## 2017-10-21 RX ORDER — ACETAMINOPHEN 325 MG/1
650 TABLET ORAL
Status: DISCONTINUED | OUTPATIENT
Start: 2017-10-21 | End: 2017-10-22 | Stop reason: HOSPADM

## 2017-10-21 RX ORDER — DIPHENHYDRAMINE HYDROCHLORIDE 50 MG/ML
50 INJECTION INTRAMUSCULAR; INTRAVENOUS EVERY 24 HOURS
Status: COMPLETED | OUTPATIENT
Start: 2017-10-21 | End: 2017-10-22

## 2017-10-21 RX ORDER — METHYLPREDNISOLONE SODIUM SUCCINATE 125 MG/2ML
125 INJECTION, POWDER, LYOPHILIZED, FOR SOLUTION INTRAMUSCULAR; INTRAVENOUS
Status: DISCONTINUED | OUTPATIENT
Start: 2017-10-21 | End: 2017-10-22 | Stop reason: HOSPADM

## 2017-10-21 RX ORDER — ACETAMINOPHEN 325 MG/1
650 TABLET ORAL EVERY 24 HOURS
Status: COMPLETED | OUTPATIENT
Start: 2017-10-21 | End: 2017-10-22

## 2017-10-21 RX ADMIN — DIPHENHYDRAMINE HYDROCHLORIDE 50 MG: 50 CAPSULE ORAL at 12:35

## 2017-10-21 RX ADMIN — PREDNISONE 80 MG: 50 TABLET ORAL at 14:15

## 2017-10-21 RX ADMIN — ACETAMINOPHEN 650 MG: 325 TABLET, FILM COATED ORAL at 05:57

## 2017-10-21 RX ADMIN — BUPROPION HYDROCHLORIDE 300 MG: 300 TABLET, FILM COATED, EXTENDED RELEASE ORAL at 08:05

## 2017-10-21 RX ADMIN — ACETAMINOPHEN 650 MG: 325 TABLET, FILM COATED ORAL at 12:35

## 2017-10-21 RX ADMIN — PANTOPRAZOLE SODIUM 40 MG: 40 TABLET, DELAYED RELEASE ORAL at 08:05

## 2017-10-21 RX ADMIN — HUMAN IMMUNOGLOBULIN G 75 G: 40 LIQUID INTRAVENOUS at 13:25

## 2017-10-21 ASSESSMENT — VISUAL ACUITY
OU: NORMAL ACUITY
OU: NORMAL ACUITY

## 2017-10-21 ASSESSMENT — PAIN DESCRIPTION - DESCRIPTORS
DESCRIPTORS: HEADACHE
DESCRIPTORS: HEADACHE

## 2017-10-21 NOTE — PROVIDER NOTIFICATION
VSS. Pt is complaining of worst headache ever. Sensitive to light. Neuro intact. Provider paged at [phone]. Awaiting call back.

## 2017-10-21 NOTE — PHARMACY-ADMISSION MEDICATION HISTORY
Admission medication history interview status for the 10/20/2017 admission is complete. See Epic admission navigator for allergy information, pharmacy, prior to admission medications and immunization status.     Medication history interview sources:  patient interview, [facility] [place]    Changes made to PTA medication list (reason)  Added: N/A  Deleted: N/A  Changed: N/A    Additional medication history information (including reliability of information, actions taken by pharmacist): Patient states that he misses taking his Adderall about 3 times per week. He has been consistently been taking his Wellbutrin XL daily in the morning.      Prior to Admission medications    Medication Sig Last Dose Taking? Auth Provider   amphetamine-dextroamphetamine (ADDERALL) 30 MG per tablet Take 1 tablet (30 mg) by mouth 2 times daily 10/19/2017 at Unknown time Yes Wilner Jordan MD   buPROPion (WELLBUTRIN XL) 300 MG 24 hr tablet Take 1 tablet (300 mg) by mouth every morning 10/20/2017 at Unknown time Yes Wilner Jordan MD         Medication history completed by: Fabio Estrada, PharmD Candidate

## 2017-10-21 NOTE — PLAN OF CARE
Afebrile, vss. Neuros intact. C/o HA with photophobia early today. Relieved with ice and eye mask. Head CT negative. IVIG started to infuse at 1325. Tolerating well thus far. Scattered petechia on BLE, hands, forearms and mouth. No new busing noted. No bleeding noted or reported. Pt up with standby, r/t plts of 1,000.  Eating and voiding well. Continue to monitor.

## 2017-10-21 NOTE — PLAN OF CARE
Problem: Patient Care Overview  Goal: Plan of Care/Patient Progress Review  [id number]:      Afebrile.  BP slightly elevated 140s/70s-80s. OVSS. Pt denies pain, nausea, dizziness, blurry vision or SOB. Mild residual pain from HA earlier, but better than earlier. Reports regular BM today, no hematuria. New bruising on foot, petachaie on BLE, and in oral mucosa. Pt has voracious appetite at baseline, and eating more while on high dose steroids. Up ad annie in room. Pt instructed to use extreme precaution ambulating and while blowing nose d/t bleeding risk. Family at bedside throughout the evening.  IVIG has titrated to max rate, will finish this evening, plan for another dose tomorrow afternoon.

## 2017-10-21 NOTE — PLAN OF CARE
Problem: Patient Care Overview  Goal: Plan of Care/Patient Progress Review  VSS. Plan of care reviewed with patient. Will hold off on platelet transfusion till tomorrow morning per MD orders. Pt given high dose steroids this evening to help plts come up. Signs and Symptoms of bleeding monitored and reviewed with patient. Call light within reach and pt calls to get up appropriately due to high risk of bleeding. Pt c/o of mild headache this AM on right side of head moving towards middle of head. Lab in room drawing CBC for this AM and MD notified. Tylenol given. Stat CT ordered and patient taken down in wheelchair. Awaiting results. Pt back in bed neuro intact. Pt aware that he needs to call if he gets out of bed. Will continue to monitor for signs and symptoms of bleed.

## 2017-10-21 NOTE — H&P
[facility], [place]    Hematology / Oncology  Admission History & Physical     Date of Admission:  10/20/2017  Date of Service: 10/20/2017  Primary Hematologist: Dr. Wright    Assessment & Plan   Roberto Spence is a 27 year old male with history of celiac disease and leukopenia/thrombocytopenia of undetermined etiology (since at least 2012). He presented to clinic for evaluation of bleeding/oozing mouth sores and petechiae, and was found to have severe thrombocytopenia. Now admitted for further evaluation and treatment.    Severe thrombocytopenia.  History of abnormal blood counts (leukopenia + thrombocytopenia) dating back to 2012, per patient. Bone marrow biopsy in 2012 showed hypocellular marrow 20-40%, no dysplasia, normal cytogenetics, normal flow cytometry. He has established care with Dr. Wright in hematology, and it was felt his abnormal counts may be related to celiac disease. Now presenting with severe thrombocytopenia, confirmed on recheck. Other cell lines and coagulation labs are essentially normal. In this setting, suspect ITP. No clear drug, toxin, infection, or other precipitating factor.  - Given prednisone 80 mg PO x 1 in the ED (1 mg/kg). Will continue this daily for now. (An alternative would be dexamethasone 40 mg PO daily x 4 days.)  - Recheck CBC in the AM.  - No platelet transfusion for now unless there is evidence of bleeding.  - Will also check HIV and HCV, as these have not been checked in the past (though low suspicion).  - Pantoprazole 40 mg daily for GI prophylaxis while on corticosteroids.  - Follow-up peripheral smear which was drawn in the ED.    FEN: gluten free diet, no maintenance IV fluids, replete lytes PRN  Prophylaxis: mechanical only (thrombocytopenia)  Code: FULL - Discussed with patient on admission.  Disposition: Admitted to inpatient status for evaluation and treatment of severe thrombocytopenia. Anticipated discharge in ~1-2 days following  improvement in platelet count.    Rebecca Padilla MD/PhD  Heme/Onc/Transplant Hospitalist    Chief Complaint   Bleeding/oozing sores in mouth and petechiae on upper and lower extremities x 2 days, with labs at an outside facility concerning for severe thrombocytopenia.  - History obtained from the patient.    History of Present Illness   Roberto Spence is a 27 year old male with history of celiac disease and leukopenia/thrombocytopenia of undetermined etiology (since at least 2012). He presented to the ED for evaluation of bleeding mouth sores and petechiae on his arms and feet, which started about 2 days ago. He notes a sore throat for the past day or two as well, though has otherwise been feeling well and the sore throat has now resolved. No blood in stool or urine, no epistaxis. He is not dizzy or lightheaded. No fevers, chills or sweats. He had some labs done with his PCP today, which were notable for a platelet count of 3K. He was instructed to go to the ED for further evaluation.    ED course - Found to be vitally stable. Repeat labs in the ED confirmed severe thrombocytopenia with a platelet count of 1K, but other cell lines and coagulation labs are essentially normal. Labs from clinic revealed a normal UA. He was given one dose of prednisone 80 mg PO in the ED (1 mg/kg), as well as pantoprazole 40 mg IV x 1 for GI prophylaxis.    The patient specifically denies use of any other OTC medications/herbs/supplements, recreational or illicit drugs, or heavy alcohol use. No concerning chemical or occupational exposures. No recent travel or insect bites. He has been compliant with a gluten-free diet, other than some fried chicken a few days ago. No prior history of major bleeding episodes, and no history of clotting issues. No family history of bleeding/clotting problems.    Past Medical History    I have reviewed this patient's medical history and updated it with pertinent information if needed.   Past Medical  History:   Diagnosis Date     ADHD      Celiac disease 06/10/2015     Depression      Iron deficiency      Platelets decreased (H) 06/10/2015    lower than average per patient report       Past Surgical History   I have reviewed this patient's surgical history and updated it with pertinent information if needed.  Past Surgical History:   Procedure Laterality Date     NO HISTORY OF SURGERY         Prior to Admission Medications   Prior to Admission Medications   Prescriptions Last Dose Informant Patient Reported? Taking?   amphetamine-dextroamphetamine (ADDERALL) 30 MG per tablet 10/19/2017 at Unknown time  No Yes   Sig: Take 1 tablet (30 mg) by mouth 2 times daily   buPROPion (WELLBUTRIN XL) 300 MG 24 hr tablet 10/20/2017 at Unknown time  No Yes   Sig: Take 1 tablet (300 mg) by mouth every morning      Facility-Administered Medications: None     Allergies   Allergies   Allergen Reactions     No Known Allergies      Nsaids Unknown     Has low platelets       Social History   Social History     Social History     Marital status:      Spouse name: N/A     Number of children: N/A     Years of education: N/A     Occupational History     Not on file.     Social History Main Topics     Smoking status: Never Smoker     Smokeless tobacco: Never Used     Alcohol use 0.0 oz/week     0 Standard drinks or equivalent per week     Drug use: No     Sexual activity: Yes     Partners: Female     Other Topics Concern     Parent/Sibling W/ Cabg, Mi Or Angioplasty Before 65f 55m? No     Social History Narrative       Family History   I have reviewed this patient's family history and updated it with pertinent information if needed.   Family History   Problem Relation Age of Onset     Coronary Artery Disease Father      Thyroid Disease Father      Known Genetic Syndrome Brother      autisism     Chemical Addiction Paternal Grandfather      Family History Negative Mother      DIABETES No family hx of      Hypertension No family hx  "of      Hyperlipidemia No family hx of      CEREBROVASCULAR DISEASE No family hx of      Breast Cancer No family hx of      Colon Cancer No family hx of      Prostate Cancer No family hx of      Other Cancer No family hx of      Depression No family hx of      Anxiety Disorder No family hx of      MENTAL ILLNESS No family hx of      Substance Abuse No family hx of      Anesthesia Reaction No family hx of      Asthma No family hx of      OSTEOPOROSIS No family hx of      Genetic Disorder No family hx of      Obesity No family hx of      Unknown/Adopted No family hx of        Review of Systems   A comprehensive ROS was performed with the patient and was found to be negative or non-contributory with the exception of that noted in the HPI above.    Physical Exam   BP (!) 131/100  Pulse 85  Temp 98.1  F (36.7  C) (Oral)  Resp 16  Ht 1.803 m (5' 11\")  Wt 78.5 kg (173 lb)  SpO2 95%  BMI 24.13 kg/m2  CONSTITUTIONAL: Well-appearing man. Alert and interactive. Lying in bed in no acute distress.  HEENT: NC/AT, PERRLA, EOMI, anicteric sclera, oropharynx is pink and moist without erythema/exudates/thrush. Two hemorrhagic lesions in the mouth, one on the tongue and one on the upper hard palate - neither currently bleeding/oozing.  CARDIOVASCULAR: RRR. Normal S1/S2. No murmurs, click, or rub. 2+ equal and bilateral radial and pedal pulses.   RESPIRATORY: CTAB. No wheezes appreciated. Normal respiratory effort on ambient air.  GASTROINTESTINAL: Soft, non-tender, non-distended, normoactive bowel sounds, no masses or organomegaly appreciated.  MUSCULOSKELETAL: No joint swelling or tenderness.  EXTREMITIES: No lower extremity edema.   SKIN: Warm and intact. No concerning lesions or rashes on exposed skin surfaces. No jaundice. Petechiae on lower extremities, most pronounced on the feet bilaterally. Also some petechiae on the hands bilaterally.  NEUROLOGIC: Alert and fully oriented, appropriately responsive during " interview.    Data   I have personally reviewed the following labs/imaging:  Results for orders placed or performed during the hospital encounter of 10/20/17 (from the past 24 hour(s))   CBC with platelets differential   Result Value Ref Range    WBC 4.4 4.0 - 11.0 10e9/L    RBC Count 5.53 4.4 - 5.9 10e12/L    Hemoglobin 16.5 13.3 - 17.7 g/dL    Hematocrit 48.1 40.0 - 53.0 %    MCV 87 78 - 100 fl    MCH 29.8 26.5 - 33.0 pg    MCHC 34.3 31.5 - 36.5 g/dL    RDW 13.5 10.0 - 15.0 %    Platelet Count 1 (LL) 150 - 450 10e9/L    Diff Method Automated Method     % Neutrophils 42.9 %    % Lymphocytes 38.6 %    % Monocytes 12.4 %    % Eosinophils 4.3 %    % Basophils 0.9 %    % Immature Granulocytes 0.9 %    Nucleated RBCs Not Measured 0 /100    Absolute Neutrophil 1.9 1.6 - 8.3 10e9/L    Absolute Lymphocytes 1.7 0.8 - 5.3 10e9/L    Absolute Monocytes 0.6 0.0 - 1.3 10e9/L    Absolute Eosinophils 0.2 0.0 - 0.7 10e9/L    Absolute Basophils 0.0 0.0 - 0.2 10e9/L    Abs Immature Granulocytes 0.0 0 - 0.4 10e9/L    Absolute Nucleated RBC Not Measured     Platelet Estimate Confirming automated cell count    Comprehensive metabolic panel   Result Value Ref Range    Sodium 141 133 - 144 mmol/L    Potassium 3.9 3.4 - 5.3 mmol/L    Chloride 107 94 - 109 mmol/L    Carbon Dioxide 27 20 - 32 mmol/L    Anion Gap 7 3 - 14 mmol/L    Glucose 102 (H) 70 - 99 mg/dL    Urea Nitrogen 15 7 - 30 mg/dL    Creatinine 0.94 0.66 - 1.25 mg/dL    GFR Estimate >90 >60 mL/min/1.7m2    GFR Estimate If Black >90 >60 mL/min/1.7m2    Calcium 8.8 8.5 - 10.1 mg/dL    Bilirubin Total 0.5 0.2 - 1.3 mg/dL    Albumin 4.2 3.4 - 5.0 g/dL    Protein Total 6.9 6.8 - 8.8 g/dL    Alkaline Phosphatase 93 40 - 150 U/L     (H) 0 - 70 U/L    AST 58 (H) 0 - 45 U/L   INR   Result Value Ref Range    INR 0.92 0.86 - 1.14   Partial thromboplastin time   Result Value Ref Range    PTT 26 22 - 37 sec   Reticulocyte count   Result Value Ref Range    % Retic 1.7 0.5 - 2.0 %     Absolute Retic 95.1 (H) 25 - 95 10e9/L   Fibrinogen activity   Result Value Ref Range    Fibrinogen 285 200 - 420 mg/dL   CRP inflammation   Result Value Ref Range    CRP Inflammation 9.9 (H) 0.0 - 8.0 mg/L   D dimer quantitative   Result Value Ref Range    D Dimer <0.3 0.0 - 0.50 ug/ml FEU   ABO/Rh type and screen   Result Value Ref Range    ABO A     RH(D) Pos     Antibody Screen Neg     Test Valid Only At          [facility]    Specimen Expires 10/23/2017

## 2017-10-21 NOTE — PROGRESS NOTES
Nursing Focus: Admission  D: Arrived at  About 2100 from ED  via liter. Patient accompanied by wife . Admitted for low plated, mouth sore, and petechia in his feet. He has no complaints at this time  .      I: Admission process began.  Patient oriented to room, enviroment, call light.  Md. notified of patients arrival on unit.     A: Vital signs stable, afebrile.  Patient stable at this time.  .     P: Implement plan of care when available. Continue to monitor patient. Nursing interventions as appropriate. Notify md with changes in pt status.

## 2017-10-21 NOTE — PROGRESS NOTES
[facility], [place]    Progress Note  Hematology / Oncology     Date of Admission:  10/20/2017  Hospital Day #: 1   Date of Service (when I saw the patient): 10/21/2017    Assessment & Plan   Roberto Spence is a 27 year old male with history of celiac disease and leukopenia/thrombocytopenia of undetermined etiology (since at least 2012). He presented to clinic for evaluation of bleeding/oozing mouth sores and petechiae, and was found to have severe thrombocytopenia. Now admitted for further evaluation and treatment.     Severe thrombocytopenia.  History of abnormal blood counts (leukopenia + thrombocytopenia) dating back to 2012, per patient. Bone marrow biopsy in 2012 showed hypocellular marrow 20-40%, no dysplasia, normal cytogenetics, normal flow cytometry. He has established care with Dr. Wright in hematology, and it was felt his abnormal counts may be related to celiac disease. Now presenting with severe thrombocytopenia, confirmed on recheck. Other cell lines and coagulation labs are essentially normal. In this setting, suspect ITP. No clear drug, toxin, infection, or other precipitating factor.  - Continue prednisone 80 mg (1 mg/kg) PO started in the ED.   - Daily CBC.  - No platelet transfusion for now unless there is evidence of bleeding.  - Will also check HIV, Hep panel, and immunglobulins as these have not been checked in the past (though low suspicion for viral involvement).  - Pantoprazole 40 mg daily for GI prophylaxis while on corticosteroids.  - Follow-up peripheral smear which was drawn in the ED.  - Will start IVIG 75mg (1mg/kg) today    Headache.  Patient woke up with a headache this morning. He states it started on his right side and moved to the left side. This eventually progressed with increased pain and photosensitivity. Neuros intact, VSS. He says he occasionally gets tension-type headaches at work from eye strain while working on the computer. These are usually  on the L side. He also has a history of occular migraines that cause visual disturbances, but no actual headache. Due to severe TCP, CT head was ordered.  -CT head negative for bleed     FEN: gluten free diet, no maintenance IV fluids, replete lytes PRN  Prophylaxis: mechanical only (thrombocytopenia)  Code: FULL - Discussed with patient on admission.  Disposition: Admitted to inpatient status for evaluation and treatment of severe thrombocytopenia. Anticipated discharge in ~1-2 days following improvement in platelet count.    Code Status: FULL  Disposition: Pending improvement in platelet count. Anticipate 2-3 more days.    This plan of care has been discussed with the staff physician, Dr. Burden.    Neelam Mascorro PA-C  Hematology/Oncology  Pager: [phone]    Interval History   Roberto continues to have the headache that he woke up with this morning. PICKARD progressed with more severe pain and photosensitivity. Otherwise feels well, no new concerns. He did notice a small amount of blood in the tissue when he blew his nose, but no active bleeding noted. Wife at beside.    Vital Signs with Ranges  Temp:  [96.7  F (35.9  C)-98.1  F (36.7  C)] 96.8  F (36  C)  Pulse:  [75-92] 84  Resp:  [16-18] 18  BP: (128-134)/() 133/82  SpO2:  [95 %-99 %] 97 %    I/O last 3 completed shifts:  In: 240 [P.O.:240]  Out: -     Vitals:    10/20/17 1729 10/20/17 2100 10/21/17 0850   Weight: 78.5 kg (173 lb) 75.8 kg (167 lb) 77.1 kg (170 lb)       Physical Exam   Constitutional: Pleasant and cooperative male seen lying in bed, in NAD. Awake, alert, interactive.  HEENT: NC/AT, EOMI, sclera clear, conjunctiva normal, OP with MMM, few scattered blood blisters noted  Respiratory: No increased work of breathing, CTAB, no crackles or wheezing.  Cardiovascular: RRR, no murmur noted. No peripheral edema.  GI: Normal bowel sounds, soft, non-distended and non-tender.  Skin: Warm, dry, well-perfused. B/l LE petechiae noted, some  scatter petechiae on UE as well.  Musculoskeletal: Extremities grossly normal, non-tender. Good strength and ROM in bed.   Neurologic: A&O. Answers questions appropriately, speech normal. Moves all extremities spontaneously.  Neuropsychiatric: Calm. Affect appropriate to situation.    Medications          acetaminophen  650 mg Oral Q24H     diphenhydrAMINE  50 mg Oral Q24H    Or     diphenhydrAMINE  50 mg Intravenous Q24H     immune globulin - sucrose free  75 g Intravenous Q24H     buPROPion  300 mg Oral QAM     pantoprazole  40 mg Oral QAM     predniSONE  80 mg Oral Daily       Data   CBC   Recent Labs  Lab 10/21/17  0604 10/20/17  1759   WBC 5.2 4.4   RBC 5.45 5.53   HGB 15.9 16.5   HCT 46.5 48.1   MCV 85 87   MCH 29.2 29.8   MCHC 34.2 34.3   RDW 13.2 13.5   PLT 1* 1*       CMP   Recent Labs  Lab 10/20/17  1759      POTASSIUM 3.9   CHLORIDE 107   CO2 27   ANIONGAP 7   *   BUN 15   CR 0.94   GFRESTIMATED >90   GFRESTBLACK >90   MALIKA 8.8   PROTTOTAL 6.9   ALBUMIN 4.2   BILITOTAL 0.5   ALKPHOS 93   AST 58*   *       LFTs   Recent Labs  Lab 10/20/17  1759   PROTTOTAL 6.9   ALBUMIN 4.2   BILITOTAL 0.5   ALKPHOS 93   AST 58*   *       Coagulation Studies   Recent Labs  Lab 10/20/17  1759   INR 0.92   PTT 26

## 2017-10-22 VITALS
WEIGHT: 172.4 LBS | OXYGEN SATURATION: 96 % | TEMPERATURE: 97.3 F | HEIGHT: 71 IN | HEART RATE: 95 BPM | SYSTOLIC BLOOD PRESSURE: 134 MMHG | BODY MASS INDEX: 24.14 KG/M2 | RESPIRATION RATE: 18 BRPM | DIASTOLIC BLOOD PRESSURE: 79 MMHG

## 2017-10-22 LAB
BASOPHILS # BLD AUTO: 0 10E9/L (ref 0–0.2)
BASOPHILS NFR BLD AUTO: 0.3 %
DIFFERENTIAL METHOD BLD: ABNORMAL
EOSINOPHIL # BLD AUTO: 0 10E9/L (ref 0–0.7)
EOSINOPHIL NFR BLD AUTO: 0 %
ERYTHROCYTE [DISTWIDTH] IN BLOOD BY AUTOMATED COUNT: 13.1 % (ref 10–15)
HCT VFR BLD AUTO: 43.2 % (ref 40–53)
HGB BLD-MCNC: 15 G/DL (ref 13.3–17.7)
IMM GRANULOCYTES # BLD: 0 10E9/L (ref 0–0.4)
IMM GRANULOCYTES NFR BLD: 0.3 %
LYMPHOCYTES # BLD AUTO: 0.6 10E9/L (ref 0.8–5.3)
LYMPHOCYTES NFR BLD AUTO: 16.3 %
MCH RBC QN AUTO: 29.2 PG (ref 26.5–33)
MCHC RBC AUTO-ENTMCNC: 34.7 G/DL (ref 31.5–36.5)
MCV RBC AUTO: 84 FL (ref 78–100)
MONOCYTES # BLD AUTO: 0.4 10E9/L (ref 0–1.3)
MONOCYTES NFR BLD AUTO: 9.4 %
NEUTROPHILS # BLD AUTO: 2.8 10E9/L (ref 1.6–8.3)
NEUTROPHILS NFR BLD AUTO: 73.7 %
NRBC # BLD AUTO: 0 10*3/UL
NRBC BLD AUTO-RTO: 0 /100
PLATELET # BLD AUTO: 19 10E9/L (ref 150–450)
RBC # BLD AUTO: 5.14 10E12/L (ref 4.4–5.9)
WBC # BLD AUTO: 3.8 10E9/L (ref 4–11)

## 2017-10-22 PROCEDURE — 94640 AIRWAY INHALATION TREATMENT: CPT

## 2017-10-22 PROCEDURE — 94642 AEROSOL INHALATION TREATMENT: CPT

## 2017-10-22 PROCEDURE — 25000125 ZZHC RX 250: Performed by: INTERNAL MEDICINE

## 2017-10-22 PROCEDURE — 25000125 ZZHC RX 250

## 2017-10-22 PROCEDURE — 36415 COLL VENOUS BLD VENIPUNCTURE: CPT | Performed by: INTERNAL MEDICINE

## 2017-10-22 PROCEDURE — 40000275 ZZH STATISTIC RCP TIME EA 10 MIN

## 2017-10-22 PROCEDURE — 25000132 ZZH RX MED GY IP 250 OP 250 PS 637: Performed by: INTERNAL MEDICINE

## 2017-10-22 PROCEDURE — 25000128 H RX IP 250 OP 636: Performed by: INTERNAL MEDICINE

## 2017-10-22 PROCEDURE — 85025 COMPLETE CBC W/AUTO DIFF WBC: CPT | Performed by: INTERNAL MEDICINE

## 2017-10-22 PROCEDURE — 25000131 ZZH RX MED GY IP 250 OP 636 PS 637: Performed by: PHYSICIAN ASSISTANT

## 2017-10-22 PROCEDURE — 25000125 ZZHC RX 250: Performed by: PHYSICIAN ASSISTANT

## 2017-10-22 RX ORDER — PENTAMIDINE ISETHIONATE 300 MG/300MG
300 INHALANT RESPIRATORY (INHALATION) ONCE
Status: COMPLETED | OUTPATIENT
Start: 2017-10-22 | End: 2017-10-22

## 2017-10-22 RX ORDER — ALBUTEROL SULFATE 0.83 MG/ML
2.5 SOLUTION RESPIRATORY (INHALATION) ONCE
Status: COMPLETED | OUTPATIENT
Start: 2017-10-22 | End: 2017-10-22

## 2017-10-22 RX ORDER — PREDNISONE 20 MG/1
80 TABLET ORAL DAILY
Qty: 50 TABLET | Refills: 0 | Status: SHIPPED | OUTPATIENT
Start: 2017-10-23 | End: 2017-10-31

## 2017-10-22 RX ADMIN — ALBUTEROL SULFATE 2.5 MG: 2.5 SOLUTION RESPIRATORY (INHALATION) at 18:20

## 2017-10-22 RX ADMIN — ACETAMINOPHEN 650 MG: 325 TABLET, FILM COATED ORAL at 10:15

## 2017-10-22 RX ADMIN — DIPHENHYDRAMINE HYDROCHLORIDE 50 MG: 50 CAPSULE ORAL at 10:15

## 2017-10-22 RX ADMIN — BUPROPION HYDROCHLORIDE 300 MG: 300 TABLET, FILM COATED, EXTENDED RELEASE ORAL at 07:50

## 2017-10-22 RX ADMIN — PREDNISONE 80 MG: 50 TABLET ORAL at 11:42

## 2017-10-22 RX ADMIN — PENTAMIDINE ISETHIONATE 300 MG: 300 INHALANT RESPIRATORY (INHALATION) at 18:21

## 2017-10-22 RX ADMIN — HUMAN IMMUNOGLOBULIN G 75 G: 40 LIQUID INTRAVENOUS at 11:03

## 2017-10-22 RX ADMIN — PANTOPRAZOLE SODIUM 40 MG: 40 TABLET, DELAYED RELEASE ORAL at 07:50

## 2017-10-22 ASSESSMENT — ENCOUNTER SYMPTOMS
WOUND: 1
HEMATURIA: 0
MYALGIAS: 0
SORE THROAT: 0
SHORTNESS OF BREATH: 0
TROUBLE SWALLOWING: 0
NUMBNESS: 0
FREQUENCY: 0
ADENOPATHY: 0
ARTHRALGIAS: 0
HEADACHES: 0
WEAKNESS: 0
DYSURIA: 0
LIGHT-HEADEDNESS: 0
COUGH: 0

## 2017-10-22 ASSESSMENT — VISUAL ACUITY: OU: NORMAL ACUITY

## 2017-10-22 NOTE — DISCHARGE SUMMARY
Discharge Summary     Roberto Spence MRN# [id number]   YOB: 1990 Age: 27 year old      Primary Care Physician:   Wilner Feliz  Admitting Attending Physician: Carie Burden MD  Discharge AttendingPhysician: Carie Burden MD   Discharge Heme Onc Fellow: Wilner Monteiro, PGY5    Date of Admission:          10/20/2017  Date of Discharge:            10/22/2017    Primary and secondary diagnoses:  # ITP    Discharge Medications:  Current Discharge Medication List      START taking these medications    Details   predniSONE (DELTASONE) 20 MG tablet Take 4 tablets (80 mg) by mouth daily  Qty: 50 tablet, Refills: 0    Associated Diagnoses: Thrombocytopenia (H)      omeprazole (PRILOSEC) 20 MG CR capsule Take 1 capsule (20 mg) by mouth daily  Qty: 15 capsule, Refills: 3    Comments: Prophylaxis heartburn while on steroids  Associated Diagnoses: Thrombocytopenia (H)         CONTINUE these medications which have NOT CHANGED    Details   amphetamine-dextroamphetamine (ADDERALL) 30 MG per tablet Take 1 tablet (30 mg) by mouth 2 times daily  Qty: 60 tablet, Refills: 0    Associated Diagnoses: Attention-deficit hyperactivity disorder, other type      buPROPion (WELLBUTRIN XL) 300 MG 24 hr tablet Take 1 tablet (300 mg) by mouth every morning  Qty: 90 tablet, Refills: 3    Associated Diagnoses: Major depressive disorder, single episode, mild (H)             CODE status: FULL    Procedures:  none    Consultations obtained:   none    Brief HPI:  27 year old male with history of celiac disease and leukopenia/thrombocytopenia of undetermined etiology (since at least 2012) who presented with severe thrombocytopenia (plt count of 1K) and petechiae.    Adapted from H&P on 10/20/2017.  Please refer to it for further details.    Problem List:  # ITP: History of abnormal blood counts (leukopenia + thrombocytopenia) dating back to 2012, per patient. Bone marrow biopsy in 2012 showed  hypocellular marrow 20-40%, no dysplasia, normal cytogenetics, normal flow cytometry. He has established care with Dr. Wright in hematology, and it was felt his abnormal counts may be related to celiac disease. Now presenting with severe thrombocytopenia, confirmed on recheck. Other cell lines and coagulation labs are essentially normal. In this setting, suspect ITP.   He had some petechiae and bruising on his lower extremities, but otherwise no bleeding.  He denies any GI bleeding.  He had a headache that was evaluated with a CT scan that was negative for any bleeding. He did note a recent stomach flu prior to hospitalization relatively recently.  Otherwise no infectious trigger.  Viral workup in process for possible etiologies.  No significant medication culprits. Treated with 2 doses of IVIG at 1 g/kg and generally tolerated this well.  Also started on steroids at 1 mg/kg.  He was also given pentamidine aerosolized to help prevent pneumocystis infection wall getting on high-dose steroids.  - Continue prednisone 80 mg/kg  -Continue acid blocker.  Prescribed Prilosec at 20 mg daily to prevent heartburn and acid reflux while on high-dose steroids.  -Discussed that he should limited physical contact and this is important to when his platelet count is low and 2 and his platelet count improves then he is able to resume those things were appropriately.  Discuss this more in more detail with him.  -He is going to get a CBC drawn in clinic on Monday and he lives near primary care clinic approximately 2 blocks from his house.  Gave him the fax # for labs to the [facility] for further evaluation and management as needed.  Suspect that there would be no concerning findings and that his platelet count should be continuing to improve.  And advised him to get labs checked in additional time next week on either Thursday or Friday and also have those labs faxed to [facility].  -Scheduled him for appointment in [facility]  clinic on October 31 to evaluate the management of his ITP and duration of his steroids and taper plan.  -Awaiting viral workup for possible secondary etiologies.  -Will message oncology team to keep them aware of what we did during his hospitalization and discharge today    Discharge Physical Exam:  Temp:  [95.6  F (35.3  C)-99.1  F (37.3  C)] 97.6  F (36.4  C)  Pulse:  [79-97] 79  Heart Rate:  [] 69  Resp:  [16-20] 16  BP: (111-149)/(54-89) 147/79  SpO2:  [91 %-99 %] 99 %  Vitals:    10/20/17 2100 10/21/17 0850 10/22/17 0722   Weight: 75.8 kg (167 lb) 77.1 kg (170 lb) 78.2 kg (172 lb 6.4 oz)     General: No acute distress.  Appears comfortable.  HEENT: Mucous membranes moist.  Did not see significant oral mucosa bleeding or petechiae.  Cardiovascular: Regular rate and rhythm, normal S1 and S2, no murmurs, rubs, gallops  Lungs: Clear to auscultation bilaterally  Abdomen: Soft, nontender, nondistended.  Normal active bowel sounds.  Skin: Some petechiae and mild bruising on lower extremities in particular.  Neuro: Alert, conversing appropriately, grossly without focal deficits.    Labs/Procedures/imaging with results:  CMP  Recent Labs  Lab 10/20/17  1759      POTASSIUM 3.9   BUN 15   CR 0.94   ALBUMIN 4.2   BILITOTAL 0.5   ALKPHOS 93   AST 58*   *     CBC  Recent Labs  Lab 10/22/17  0515 10/21/17  0604 10/20/17  1759   WBC 3.8* 5.2 4.4   HGB 15.0 15.9 16.5   PLT 19* 1* 1*     INR  Recent Labs  Lab 10/20/17  1759   INR 0.92       Discharge instructions:    Discharge Procedure Orders  Reason for your hospital stay   Order Comments: You were hospitalized for thrombocytopenia and petechiae.     Adult [facility] Follow-up and recommended labs and tests   Order Comments: Follow up appointment 10/31 and check-in time with Alka Benson from hematology at 2:25 pm. CBC with differential and BMP.     Appointments on [place] and/or [facility] (with [facility] or [facility] provider or service). Call  [phone] if you haven't heard regarding these appointments within 7 days of discharge.     Activity   Order Comments: Your activity upon discharge: would avoid significant physical activity particularly involving contact until platelet count is above 50.   Order Specific Question Answer Comments   Is discharge order? Yes      When to contact your care team   Order Comments: Bloody stool or black stool.   Significant worsening bleeding  Headache     Discharge Instructions   Order Comments: Follow up appointment 10/31 and check-in time with Alka Benson from hematology at 2:25 pm. She is very good. She is one of the physician assistants who is very good.    We would like have you get you labs checked twice next week. If you can get your labs checked on Monday and Thursday or Friday. You can have them forwarded to [facility] fax: [phone]. You can have as attention to Alka Benson and Dr. Wright.     [facility] main line: [phone]  [facility] triage: [phone] (available 24/7)    Alka Benson and Dr. Wright will determine taper of your steroids. I will send you out with 80 mg daily. We will send you out with prilosec to prevent stomach irritation on steroids.     Full Code     Diet   Order Comments: Follow this diet upon discharge: regular   Order Specific Question Answer Comments   Is discharge order? Yes         Labs pending at discharge:  Unresulted Labs Ordered in the Past 30 Days of this Admission     Date and Time Order Name Status Description    10/21/2017 0717 Platelets prepare order unit conditional In process     10/21/2017 0604 Hepatitis B surface antigen In process     10/21/2017 0604 Hepatitis B core antibody In process     10/21/2017 0604 Hepatitis A antibody IgM In process     10/21/2017 0604 Hepatitis B Surface Antibody In process     10/21/2017 0604 Immunoglobulins A G and M In process     10/20/2017 2141 Hepatits C antibody In process     10/20/2017 2141 HIV Antigen Antibody  Combo In process     10/20/2017 1818 Blood Morphology Pathologist Review In process            Discharge condition: stable  Disposition: home    Pt care discussed with Attending Staff on day of discharge    I have reviewed all vitals, labs, and other data    Wilner Monteiro MD, PGY5 heme onc fellow   Pager #[phone]

## 2017-10-22 NOTE — PLAN OF CARE
Problem: Patient Care Overview  Goal: Plan of Care/Patient Progress Review  Outcome: Adequate for Discharge Date Met:  10/22/17  Tolerating IVIG without any difficulty. Got pentamidine neb. Discharged to home with Wife at 6:45pm. Reviewed d/c paper works, piv removed. Scripts sent to his local pharmacy. He took all of his belongings.

## 2017-10-22 NOTE — PLAN OF CARE
Problem: Patient Care Overview  Goal: Plan of Care/Patient Progress Review  Outcome: Improving  Patient afebrile. No complaints of pain. Platelet count this morning was 19K.  No new bruising or petechiae noted. Mouth lesions improving. IV ig infusing per pharmacy rate guidelines. Patient received tylenol and benadryl as premeds. He experienced some facial flushing initially but vital signs remained stable and patient did not experience any respiratory distress. Infusion rate was not increased at that time, but later when patient stated that he felt fine, infusion rate schedule was resumed. He has offered no complaints since that time. Plan is to discharge to home after pentamadine neb treatment.

## 2017-10-22 NOTE — PLAN OF CARE
Problem: Patient Care Overview  Goal: Plan of Care/Patient Progress Review  VSS. Pt has no reports of bleeding. Will continue to monitor for signs and symptoms of bleeding. Pt has petechiae on BLE and BUE. Mouth sores in mouth present but not actively bleeding. Pt having adequate urine output and calls appropriately for assistance when getting out of bed. Plan to continue high dose steroids and IVIG today. Pt rested well between cares.

## 2017-10-23 ENCOUNTER — TELEPHONE (OUTPATIENT)
Dept: FAMILY MEDICINE | Facility: CLINIC | Age: 27
End: 2017-10-23

## 2017-10-23 ENCOUNTER — CARE COORDINATION (OUTPATIENT)
Dept: CARE COORDINATION | Facility: CLINIC | Age: 27
End: 2017-10-23

## 2017-10-23 LAB
COPATH REPORT: NORMAL
HAV IGM SERPL QL IA: NONREACTIVE
HBV CORE AB SERPL QL IA: NONREACTIVE
HBV SURFACE AB SERPL IA-ACNC: 2.52 M[IU]/ML
HBV SURFACE AG SERPL QL IA: NONREACTIVE
HCV AB SERPL QL IA: NONREACTIVE
HIV 1+2 AB+HIV1 P24 AG SERPL QL IA: NONREACTIVE
IGA SERPL-MCNC: 25 MG/DL (ref 70–380)
IGG SERPL-MCNC: 444 MG/DL (ref 695–1620)
IGM SERPL-MCNC: 31 MG/DL (ref 60–265)

## 2017-10-23 NOTE — PROGRESS NOTES
This patient is a Hematology/Oncology patient and they will handle the post discharge follow up

## 2017-10-23 NOTE — TELEPHONE ENCOUNTER
ED/Discharge Protocol    Patient has hospital f/u scheduled for 10/31/2017 with new PCP's office  [facility]

## 2017-10-31 ENCOUNTER — ONCOLOGY VISIT (OUTPATIENT)
Dept: ONCOLOGY | Facility: CLINIC | Age: 27
End: 2017-10-31
Attending: INTERNAL MEDICINE
Payer: COMMERCIAL

## 2017-10-31 VITALS
WEIGHT: 173 LBS | RESPIRATION RATE: 18 BRPM | SYSTOLIC BLOOD PRESSURE: 138 MMHG | OXYGEN SATURATION: 98 % | DIASTOLIC BLOOD PRESSURE: 100 MMHG | BODY MASS INDEX: 24.22 KG/M2 | HEART RATE: 83 BPM | TEMPERATURE: 98 F | HEIGHT: 71 IN

## 2017-10-31 DIAGNOSIS — I10 HYPERTENSION, UNSPECIFIED TYPE: ICD-10-CM

## 2017-10-31 DIAGNOSIS — R74.01 TRANSAMINITIS: ICD-10-CM

## 2017-10-31 DIAGNOSIS — D69.6 THROMBOCYTOPENIA (H): Primary | ICD-10-CM

## 2017-10-31 LAB
ALBUMIN SERPL-MCNC: 4 G/DL (ref 3.4–5)
ALP SERPL-CCNC: 66 U/L (ref 40–150)
ALT SERPL W P-5'-P-CCNC: 332 U/L (ref 0–70)
ANION GAP SERPL CALCULATED.3IONS-SCNC: 7 MMOL/L (ref 3–14)
AST SERPL W P-5'-P-CCNC: 161 U/L (ref 0–45)
BASOPHILS # BLD AUTO: 0 10E9/L (ref 0–0.2)
BASOPHILS NFR BLD AUTO: 0.2 %
BILIRUB SERPL-MCNC: 0.5 MG/DL (ref 0.2–1.3)
BUN SERPL-MCNC: 14 MG/DL (ref 7–30)
CALCIUM SERPL-MCNC: 8.6 MG/DL (ref 8.5–10.1)
CHLORIDE SERPL-SCNC: 102 MMOL/L (ref 94–109)
CO2 SERPL-SCNC: 26 MMOL/L (ref 20–32)
CREAT SERPL-MCNC: 0.73 MG/DL (ref 0.66–1.25)
DIFFERENTIAL METHOD BLD: NORMAL
EOSINOPHIL # BLD AUTO: 0 10E9/L (ref 0–0.7)
EOSINOPHIL NFR BLD AUTO: 0 %
ERYTHROCYTE [DISTWIDTH] IN BLOOD BY AUTOMATED COUNT: 13.2 % (ref 10–15)
GFR SERPL CREATININE-BSD FRML MDRD: >90 ML/MIN/1.7M2
GLUCOSE SERPL-MCNC: 139 MG/DL (ref 70–99)
HCT VFR BLD AUTO: 46.5 % (ref 40–53)
HGB BLD-MCNC: 15.5 G/DL (ref 13.3–17.7)
IMM GRANULOCYTES # BLD: 0 10E9/L (ref 0–0.4)
IMM GRANULOCYTES NFR BLD: 0.4 %
LYMPHOCYTES # BLD AUTO: 1 10E9/L (ref 0.8–5.3)
LYMPHOCYTES NFR BLD AUTO: 20.8 %
MCH RBC QN AUTO: 29 PG (ref 26.5–33)
MCHC RBC AUTO-ENTMCNC: 33.3 G/DL (ref 31.5–36.5)
MCV RBC AUTO: 87 FL (ref 78–100)
MONOCYTES # BLD AUTO: 0.4 10E9/L (ref 0–1.3)
MONOCYTES NFR BLD AUTO: 7.2 %
NEUTROPHILS # BLD AUTO: 3.5 10E9/L (ref 1.6–8.3)
NEUTROPHILS NFR BLD AUTO: 71.4 %
NRBC # BLD AUTO: 0 10*3/UL
NRBC BLD AUTO-RTO: 0 /100
PLATELET # BLD AUTO: 210 10E9/L (ref 150–450)
POTASSIUM SERPL-SCNC: 3.8 MMOL/L (ref 3.4–5.3)
PROT SERPL-MCNC: 8.2 G/DL (ref 6.8–8.8)
RBC # BLD AUTO: 5.35 10E12/L (ref 4.4–5.9)
SODIUM SERPL-SCNC: 135 MMOL/L (ref 133–144)
WBC # BLD AUTO: 4.9 10E9/L (ref 4–11)

## 2017-10-31 PROCEDURE — 99213 OFFICE O/P EST LOW 20 MIN: CPT | Mod: ZF

## 2017-10-31 PROCEDURE — 85025 COMPLETE CBC W/AUTO DIFF WBC: CPT | Performed by: PHYSICIAN ASSISTANT

## 2017-10-31 PROCEDURE — 36415 COLL VENOUS BLD VENIPUNCTURE: CPT

## 2017-10-31 PROCEDURE — 99214 OFFICE O/P EST MOD 30 MIN: CPT | Mod: ZP | Performed by: PHYSICIAN ASSISTANT

## 2017-10-31 PROCEDURE — 80053 COMPREHEN METABOLIC PANEL: CPT | Performed by: PHYSICIAN ASSISTANT

## 2017-10-31 RX ORDER — PREDNISONE 20 MG/1
60 TABLET ORAL DAILY
Qty: 50 TABLET | Refills: 0 | Status: SHIPPED | OUTPATIENT
Start: 2017-10-31 | End: 2020-05-27

## 2017-10-31 ASSESSMENT — PAIN SCALES - GENERAL: PAINLEVEL: NO PAIN (0)

## 2017-10-31 NOTE — NURSING NOTE
"Oncology Rooming Note    October 31, 2017 2:33 PM   Roberto Spence is a 27 year old male who presents for:    Chief Complaint   Patient presents with     Oncology Clinic Visit     Return visit related to Thrombocytopenia     Initial Vitals: BP (!) 147/106  Pulse 83  Temp 98  F (36.7  C) (Oral)  Resp 18  Ht 1.803 m (5' 10.98\")  Wt 78.5 kg (173 lb)  SpO2 98%  BMI 24.14 kg/m2 Estimated body mass index is 24.14 kg/(m^2) as calculated from the following:    Height as of this encounter: 1.803 m (5' 10.98\").    Weight as of this encounter: 78.5 kg (173 lb). Body surface area is 1.98 meters squared.  No Pain (0) Comment: Data Unavailable   No LMP for male patient.  Allergies reviewed: Yes  Medications reviewed: Yes    Medications: Medication refills not needed today.  Pharmacy name entered into [facility]:    [facility] [address] [place] [address] [address] [place] [address]    Clinical concerns: No new concerns. Provider was notified.    10 minutes for nursing intake (face to face time)     Mirella Lua LPN            "

## 2017-10-31 NOTE — PROGRESS NOTES
Oncology/Hematology Visit Note  Oct 31, 2017    Reason for Visit: Hospital follow-up for ITP    History of Present Illness: Roberto Spence is a 27 year old male with who was recently diagnosed with thrombocytopenia thought secondary to ITP. Briefly, his history is as follows:    2012: Per patient, noted to have abnormal blood couts (leukopenia and thrombocytopenia) and BMBx showed hypocellular marrow without dysplasia and normal cytogeneics and flow. Established care with Dr. Wright, who thought it was possibly related to his celiac disease. He last saw her in Novemeber 2016 where he was found to be asymptomatic and stable blood counts, so no f/u was needed.  He presented to the ED on 10/20 after his platelet count was found to be 3 at the clinic, along with mouth bleeding and petechiae. Work-up was unremarkable and as such diagnosed with ITP. He was treated with IVIGx2, along with prednisone 80mg/kg.        Interval History:  Mr. Spence returns to clinic today unaccompanied. He has no complaints. He states his previous mouth sores and petechiae is resolved. He has had no other bleeding or bruising issues. He is tolerating the prednisone well and has noted increased energy but no issues with reflux or insomnia. He is occasionally nauseated but has not vomited. He continues with his gluten-free diet and has not started any new supplements/medications, and denies drug use. He admits to on average one hard alcohol drink per week. He denies any recent infections. Viral serologies inpatient were negative.     Review of Systems:  Patient denies fevers, chills, night sweats, unexplained weight changes, headaches, dizziness, vision or hearing changes, new lumps or bumps, chest pain, shortness of breath, cough, abdominal pain, nausea, vomiting, changes to bowel or bladder, swelling of extremities, bleeding issues, or rash.    Current Outpatient Prescriptions   Medication Sig Dispense Refill     predniSONE (DELTASONE) 20 MG  "tablet Take 4 tablets (80 mg) by mouth daily 50 tablet 0     omeprazole (PRILOSEC) 20 MG CR capsule Take 1 capsule (20 mg) by mouth daily 15 capsule 3     amphetamine-dextroamphetamine (ADDERALL) 30 MG per tablet Take 1 tablet (30 mg) by mouth 2 times daily 60 tablet 0     buPROPion (WELLBUTRIN XL) 300 MG 24 hr tablet Take 1 tablet (300 mg) by mouth every morning 90 tablet 3     Physical Examination:  BP (!) 138/100  Pulse 83  Temp 98  F (36.7  C) (Oral)  Resp 18  Ht 1.803 m (5' 10.98\")  Wt 78.5 kg (173 lb)  SpO2 98%  BMI 24.14 kg/m2  Wt Readings from Last 10 Encounters:   10/22/17 78.2 kg (172 lb 6.4 oz)   05/09/17 82.6 kg (182 lb)   11/16/16 82.5 kg (181 lb 12.8 oz)   08/09/16 79.4 kg (175 lb)   05/26/16 79.4 kg (175 lb)   05/25/16 79.4 kg (175 lb)   03/01/16 79.4 kg (175 lb)   10/23/15 76.4 kg (168 lb 6.4 oz)   09/21/15 77.7 kg (171 lb 4.8 oz)   08/18/15 77.1 kg (170 lb)     Constitutional: Well-appearing male in no acute distress.  Eyes: EOMI, PERRL. No scleral icterus.  ENT: Oral mucosa is moist without lesions or thrush.   Lymphatic: Neck is supple without cervical or supraclavicular lymphadenopathy.   Cardiovascular: Regular rate and rhythm. No murmurs, gallops, or rubs. No peripheral edema.  Respiratory: Clear to auscultation bilaterally. No wheezes or crackles.  Gastrointestinal: Bowel sounds present. Abdomen soft, non-tender. No palpable hepatosplenomegaly or masses.   Neurologic: Cranial nerves II through XII are grossly intact.  Skin: Few petechiae on right forearm that are resolving per patient. No other rashes, petechiae, or bruising noted on exposed skin.    Laboratory Data:   10/31/2017 14:49   Sodium 135   Potassium 3.8   Chloride 102   Carbon Dioxide 26   Urea Nitrogen 14   Creatinine 0.73   GFR Estimate >90   GFR Estimate If Black >90   Calcium 8.6   Anion Gap 7   Albumin 4.0   Protein Total 8.2   Bilirubin Total 0.5   Alkaline Phosphatase 66    (H)    (H)   Glucose 139 (H) "   WBC 4.9   Hemoglobin 15.5   Hematocrit 46.5   Platelet Count 210   RBC Count 5.35   MCV 87   MCH 29.0   MCHC 33.3   RDW 13.2   Diff Method Automated Method   % Neutrophils 71.4   % Lymphocytes 20.8   % Monocytes 7.2   % Eosinophils 0.0   % Basophils 0.2   % Immature Granulocytes 0.4   Nucleated RBCs 0   Absolute Neutrophil 3.5   Absolute Lymphocytes 1.0   Absolute Monocytes 0.4   Absolute Eosinophils 0.0   Absolute Basophils 0.0   Abs Immature Granulocytes 0.0   Absolute Nucleated RBC 0.0       Assessment and Plan:  1. Thrombocytopenia secondary to ITP, improving  Platelets have slowly up-trended (63 on 10/23, 161 on 10/26). Today they continue to improve at 210. Given prior negative work-up with Dr. Wright other than celiac disease and additional negative work-up now in the hospital, most likely diagnosis is ITP. This has responded well to traditional treatments of IVIG and now high dose prednisone. Given his counts have improved, we can start a prednisone taper as follows:  -60mg daily for one week  -Recheck labs next Tuesday 11/7. If plt stable, can decrease to 40mg daily for one week  -Recheck labs Tuesday 11/14. If plt stable, can decrease to 20mg daily  -Follow-up with Dr. Wright on 11/17 with labs    Patient prefers to have his labs drawn at his home PCP clinic. Will fax orders and can access results on care everywhere.     2. Elevated serum transaminases  Patient noted to have increase in AST/ALT on today's labs. Alk phos and Bili normal. No symptoms and viral serologies negative. Most likely etiology at this time is drug or alcohol induced. Instructed him to stop all alcohol, avoid tylenol, NSAIDs, and ASA, and we will recheck the next two weeks to ensure resolution. Did discuss monitoring for signs of hepatitis.    3. Hypertension, likely steroid induced  Creat normal. Initial /106, recheck 138/100. Asymptomatic. Likely his elevated BP from his high dose prednisone. Monitor as he tapers down.    4.  ID  Viral serologies negative. Given pentamidine inpatient for PCP ppx. No further ID concerns.    Clay Calvert PA-C    The patient was seen in conjunction with Clay Calvert PA-C who served as a scribe for today's visit. I have reviewed and edited the above note, and agree with the above findings and plan.    Alka Benson PA-C  [facility]  [address] [place] [address]  [phone]

## 2017-10-31 NOTE — PATIENT INSTRUCTIONS
-Reduce steroids to 60mg daily. Keep this dose until we tell you do reduce it further.  -Get labs checked next Tuesday and the following Tuesday  -We will schedule an appointment with Dr. Wright on 11/17 (scheduling will call you)    Results for BEVERLY CASIANO (MRN [id number]) as of 10/31/2017 15:27   Ref. Range 10/31/2017 14:49   Sodium Latest Ref Range: 133 - 144 mmol/L 135   Potassium Latest Ref Range: 3.4 - 5.3 mmol/L 3.8   Chloride Latest Ref Range: 94 - 109 mmol/L 102   Carbon Dioxide Latest Ref Range: 20 - 32 mmol/L 26   Urea Nitrogen Latest Ref Range: 7 - 30 mg/dL 14   Creatinine Latest Ref Range: 0.66 - 1.25 mg/dL 0.73   GFR Estimate Latest Ref Range: >60 mL/min/1.7m2 >90   GFR Estimate If Black Latest Ref Range: >60 mL/min/1.7m2 >90   Calcium Latest Ref Range: 8.5 - 10.1 mg/dL 8.6   Anion Gap Latest Ref Range: 3 - 14 mmol/L 7   Albumin Latest Ref Range: 3.4 - 5.0 g/dL 4.0   Protein Total Latest Ref Range: 6.8 - 8.8 g/dL 8.2   Bilirubin Total Latest Ref Range: 0.2 - 1.3 mg/dL 0.5   Alkaline Phosphatase Latest Ref Range: 40 - 150 U/L 66   ALT Latest Ref Range: 0 - 70 U/L 332 (H)   AST Latest Ref Range: 0 - 45 U/L 161 (H)   Glucose Latest Ref Range: 70 - 99 mg/dL 139 (H)   WBC Latest Ref Range: 4.0 - 11.0 10e9/L 4.9   Hemoglobin Latest Ref Range: 13.3 - 17.7 g/dL 15.5   Hematocrit Latest Ref Range: 40.0 - 53.0 % 46.5   Platelet Count Latest Ref Range: 150 - 450 10e9/L 210   RBC Count Latest Ref Range: 4.4 - 5.9 10e12/L 5.35   MCV Latest Ref Range: 78 - 100 fl 87   MCH Latest Ref Range: 26.5 - 33.0 pg 29.0   MCHC Latest Ref Range: 31.5 - 36.5 g/dL 33.3   RDW Latest Ref Range: 10.0 - 15.0 % 13.2   Diff Method Unknown Automated Method   % Neutrophils Latest Units: % 71.4   % Lymphocytes Latest Units: % 20.8   % Monocytes Latest Units: % 7.2   % Eosinophils Latest Units: % 0.0   % Basophils Latest Units: % 0.2   % Immature Granulocytes Latest Units: % 0.4   Nucleated RBCs Latest Ref Range: 0 /100 0   Absolute  Neutrophil Latest Ref Range: 1.6 - 8.3 10e9/L 3.5   Absolute Lymphocytes Latest Ref Range: 0.8 - 5.3 10e9/L 1.0   Absolute Monocytes Latest Ref Range: 0.0 - 1.3 10e9/L 0.4   Absolute Eosinophils Latest Ref Range: 0.0 - 0.7 10e9/L 0.0   Absolute Basophils Latest Ref Range: 0.0 - 0.2 10e9/L 0.0   Abs Immature Granulocytes Latest Ref Range: 0 - 0.4 10e9/L 0.0   Absolute Nucleated RBC Unknown 0.0

## 2017-10-31 NOTE — MR AVS SNAPSHOT
After Visit Summary   10/31/2017    Beverly Casiano    MRN: [id number]           Patient Information     Date Of Birth          1990        Visit Information        Provider Department      10/31/2017 2:40 PM Alka Benson PA M [facility]        Today's Diagnoses     Thrombocytopenia (H)    -  1    Hypertension, unspecified type        Transaminitis          Care Instructions    -Reduce steroids to 60mg daily. Keep this dose until we tell you do reduce it further.  -Get labs checked next Tuesday and the following Tuesday  -We will schedule an appointment with Dr. Wright on 11/17 (scheduling will call you)    Results for BEVERLY CASIANO (MRN [id number]) as of 10/31/2017 15:27   Ref. Range 10/31/2017 14:49   Sodium Latest Ref Range: 133 - 144 mmol/L 135   Potassium Latest Ref Range: 3.4 - 5.3 mmol/L 3.8   Chloride Latest Ref Range: 94 - 109 mmol/L 102   Carbon Dioxide Latest Ref Range: 20 - 32 mmol/L 26   Urea Nitrogen Latest Ref Range: 7 - 30 mg/dL 14   Creatinine Latest Ref Range: 0.66 - 1.25 mg/dL 0.73   GFR Estimate Latest Ref Range: >60 mL/min/1.7m2 >90   GFR Estimate If Black Latest Ref Range: >60 mL/min/1.7m2 >90   Calcium Latest Ref Range: 8.5 - 10.1 mg/dL 8.6   Anion Gap Latest Ref Range: 3 - 14 mmol/L 7   Albumin Latest Ref Range: 3.4 - 5.0 g/dL 4.0   Protein Total Latest Ref Range: 6.8 - 8.8 g/dL 8.2   Bilirubin Total Latest Ref Range: 0.2 - 1.3 mg/dL 0.5   Alkaline Phosphatase Latest Ref Range: 40 - 150 U/L 66   ALT Latest Ref Range: 0 - 70 U/L 332 (H)   AST Latest Ref Range: 0 - 45 U/L 161 (H)   Glucose Latest Ref Range: 70 - 99 mg/dL 139 (H)   WBC Latest Ref Range: 4.0 - 11.0 10e9/L 4.9   Hemoglobin Latest Ref Range: 13.3 - 17.7 g/dL 15.5   Hematocrit Latest Ref Range: 40.0 - 53.0 % 46.5   Platelet Count Latest Ref Range: 150 - 450 10e9/L 210   RBC Count Latest Ref Range: 4.4 - 5.9 10e12/L 5.35   MCV Latest Ref Range: 78 - 100 fl 87   MCH Latest Ref Range: 26.5 -  33.0 pg 29.0   MCHC Latest Ref Range: 31.5 - 36.5 g/dL 33.3   RDW Latest Ref Range: 10.0 - 15.0 % 13.2   Diff Method Unknown Automated Method   % Neutrophils Latest Units: % 71.4   % Lymphocytes Latest Units: % 20.8   % Monocytes Latest Units: % 7.2   % Eosinophils Latest Units: % 0.0   % Basophils Latest Units: % 0.2   % Immature Granulocytes Latest Units: % 0.4   Nucleated RBCs Latest Ref Range: 0 /100 0   Absolute Neutrophil Latest Ref Range: 1.6 - 8.3 10e9/L 3.5   Absolute Lymphocytes Latest Ref Range: 0.8 - 5.3 10e9/L 1.0   Absolute Monocytes Latest Ref Range: 0.0 - 1.3 10e9/L 0.4   Absolute Eosinophils Latest Ref Range: 0.0 - 0.7 10e9/L 0.0   Absolute Basophils Latest Ref Range: 0.0 - 0.2 10e9/L 0.0   Abs Immature Granulocytes Latest Ref Range: 0 - 0.4 10e9/L 0.0   Absolute Nucleated RBC Unknown 0.0             Follow-ups after your visit        Future tests that were ordered for you today     Open Standing Orders        Priority Remaining Interval Expires Ordered    CBC with platelets differential Routine 4/4 weekly  10/31/2018 10/31/2017    Hepatic panel Routine 4/4 weekly 10/31/2018 10/31/2017          Open Future Orders        Priority Expected Expires Ordered    *CBC with platelets differential Routine 11/17/2017 10/31/2018 10/31/2017    Hepatic panel Routine 11/17/2017 10/31/2018 10/31/2017            Who to contact     If you have questions or need follow up information about today's clinic visit or your schedule please contact [facility] directly at [phone].  Normal or non-critical lab and imaging results will be communicated to you by MyChart, letter or phone within 4 business days after the clinic has received the results. If you do not hear from us within 7 days, please contact the clinic through MyChart or phone. If you have a critical or abnormal lab result, we will notify you by phone as soon as possible.  Submit refill requests through [employer] or call your pharmacy and  "they will forward the refill request to us. Please allow 3 business days for your refill to be completed.          Additional Information About Your Visit        [employer] Information     [employer] gives you secure access to your electronic health record. If you see a primary care provider, you can also send messages to your care team and make appointments. If you have questions, please call your primary care clinic.  If you do not have a primary care provider, please call [phone] and they will assist you.        Care EveryWhere ID     This is your Care EveryWhere ID. This could be used by other organizations to access your [place] medical records  [id number]        Your Vitals Were     Pulse Temperature Respirations Height Pulse Oximetry BMI (Body Mass Index)    83 98  F (36.7  C) (Oral) 18 1.803 m (5' 10.98\") 98% 24.14 kg/m2       Blood Pressure from Last 3 Encounters:   10/31/17 (!) 138/100   10/22/17 134/79   05/09/17 120/80    Weight from Last 3 Encounters:   10/31/17 78.5 kg (173 lb)   10/22/17 78.2 kg (172 lb 6.4 oz)   05/09/17 82.6 kg (182 lb)              We Performed the Following     *CBC with platelets differential     Comprehensive metabolic panel          Today's Medication Changes          These changes are accurate as of: 10/31/17 11:59 PM.  If you have any questions, ask your nurse or doctor.               These medicines have changed or have updated prescriptions.        Dose/Directions    predniSONE 20 MG tablet   Commonly known as:  DELTASONE   This may have changed:  how much to take   Used for:  Thrombocytopenia (H)   Changed by:  Alka Benson PA        Dose:  60 mg   Take 3 tablets (60 mg) by mouth daily   Quantity:  50 tablet   Refills:  0            Where to get your medicines      These medications were sent to [place] [address] [place] [facility] [address] [place] [address]  [address] [facility] [address]     Phone:  [phone]     predniSONE 20 MG tablet                " Primary Care Provider Office Phone # Fax #    Wilner Feliz [phone]       [facility] [address] [place] [address]        Equal Access to Services     BARBARA SOLER : Hadii aad ku hadestuardo De Leon, wabroda marshal, qaybta karamonada randolph, delisa rodriguez genesisshahla maurice doroteo charles. So [facility] [phone].    ATENCIÓN: Si habla español, tiene a khan disposición servicios gratuitos de asistencia lingüística. Llame al [phone].    We comply with applicable federal civil rights laws and [place] laws. We do not discriminate on the basis of race, color, national origin, age, disability, sex, sexual orientation, or gender identity.            Thank you!     Thank you for choosing [facility]  for your care. Our goal is always to provide you with excellent care. Hearing back from our patients is one way we can continue to improve our services. Please take a few minutes to complete the written survey that you may receive in the mail after your visit with us. Thank you!             Your Updated Medication List - Protect others around you: Learn how to safely use, store and throw away your medicines at www.disposemymeds.org.          This list is accurate as of: 10/31/17 11:59 PM.  Always use your most recent med list.                   Brand Name Dispense Instructions for use Diagnosis    amphetamine-dextroamphetamine 30 MG per tablet    ADDERALL    60 tablet    Take 1 tablet (30 mg) by mouth 2 times daily    Attention-deficit hyperactivity disorder, other type       buPROPion 300 MG 24 hr tablet    WELLBUTRIN XL    90 tablet    Take 1 tablet (300 mg) by mouth every morning    Major depressive disorder, single episode, mild (H)       omeprazole 20 MG CR capsule    priLOSEC    15 capsule    Take 1 capsule (20 mg) by mouth daily    Thrombocytopenia (H)       predniSONE 20 MG tablet    DELTASONE    50 tablet    Take 3 tablets (60 mg) by mouth daily    Thrombocytopenia (H)

## 2017-10-31 NOTE — LETTER
10/31/2017      RE: Roberto Spence  [address]  [facility] [address]       Oncology/Hematology Visit Note  Oct 31, 2017    Reason for Visit: Hospital follow-up for ITP    History of Present Illness: Roberto Spence is a 27 year old male with who was recently diagnosed with thrombocytopenia thought secondary to ITP. Briefly, his history is as follows:    2012: Per patient, noted to have abnormal blood couts (leukopenia and thrombocytopenia) and BMBx showed hypocellular marrow without dysplasia and normal cytogeneics and flow. Established care with Dr. Wright, who thought it was possibly related to his celiac disease. He last saw her in Novemeber 2016 where he was found to be asymptomatic and stable blood counts, so no f/u was needed.  He presented to the ED on 10/20 after his platelet count was found to be 3 at the clinic, along with mouth bleeding and petechiae. Work-up was unremarkable and as such diagnosed with ITP. He was treated with IVIGx2, along with prednisone 80mg/kg.        Interval History:  Mr. Spence returns to clinic today unaccompanied. He has no complaints. He states his previous mouth sores and petechiae is resolved. He has had no other bleeding or bruising issues. He is tolerating the prednisone well and has noted increased energy but no issues with reflux or insomnia. He is occasionally nauseated but has not vomited. He continues with his gluten-free diet and has not started any new supplements/medications, and denies drug use. He admits to on average one hard alcohol drink per week. He denies any recent infections. Viral serologies inpatient were negative.     Review of Systems:  Patient denies fevers, chills, night sweats, unexplained weight changes, headaches, dizziness, vision or hearing changes, new lumps or bumps, chest pain, shortness of breath, cough, abdominal pain, nausea, vomiting, changes to bowel or bladder, swelling of extremities, bleeding issues, or rash.    Current Outpatient  "Prescriptions   Medication Sig Dispense Refill     predniSONE (DELTASONE) 20 MG tablet Take 4 tablets (80 mg) by mouth daily 50 tablet 0     omeprazole (PRILOSEC) 20 MG CR capsule Take 1 capsule (20 mg) by mouth daily 15 capsule 3     amphetamine-dextroamphetamine (ADDERALL) 30 MG per tablet Take 1 tablet (30 mg) by mouth 2 times daily 60 tablet 0     buPROPion (WELLBUTRIN XL) 300 MG 24 hr tablet Take 1 tablet (300 mg) by mouth every morning 90 tablet 3     Physical Examination:  BP (!) 138/100  Pulse 83  Temp 98  F (36.7  C) (Oral)  Resp 18  Ht 1.803 m (5' 10.98\")  Wt 78.5 kg (173 lb)  SpO2 98%  BMI 24.14 kg/m2  Wt Readings from Last 10 Encounters:   10/22/17 78.2 kg (172 lb 6.4 oz)   05/09/17 82.6 kg (182 lb)   11/16/16 82.5 kg (181 lb 12.8 oz)   08/09/16 79.4 kg (175 lb)   05/26/16 79.4 kg (175 lb)   05/25/16 79.4 kg (175 lb)   03/01/16 79.4 kg (175 lb)   10/23/15 76.4 kg (168 lb 6.4 oz)   09/21/15 77.7 kg (171 lb 4.8 oz)   08/18/15 77.1 kg (170 lb)     Constitutional: Well-appearing male in no acute distress.  Eyes: EOMI, PERRL. No scleral icterus.  ENT: Oral mucosa is moist without lesions or thrush.   Lymphatic: Neck is supple without cervical or supraclavicular lymphadenopathy.   Cardiovascular: Regular rate and rhythm. No murmurs, gallops, or rubs. No peripheral edema.  Respiratory: Clear to auscultation bilaterally. No wheezes or crackles.  Gastrointestinal: Bowel sounds present. Abdomen soft, non-tender. No palpable hepatosplenomegaly or masses.   Neurologic: Cranial nerves II through XII are grossly intact.  Skin: Few petechiae on right forearm that are resolving per patient. No other rashes, petechiae, or bruising noted on exposed skin.    Laboratory Data:   10/31/2017 14:49   Sodium 135   Potassium 3.8   Chloride 102   Carbon Dioxide 26   Urea Nitrogen 14   Creatinine 0.73   GFR Estimate >90   GFR Estimate If Black >90   Calcium 8.6   Anion Gap 7   Albumin 4.0   Protein Total 8.2   Bilirubin " Total 0.5   Alkaline Phosphatase 66    (H)    (H)   Glucose 139 (H)   WBC 4.9   Hemoglobin 15.5   Hematocrit 46.5   Platelet Count 210   RBC Count 5.35   MCV 87   MCH 29.0   MCHC 33.3   RDW 13.2   Diff Method Automated Method   % Neutrophils 71.4   % Lymphocytes 20.8   % Monocytes 7.2   % Eosinophils 0.0   % Basophils 0.2   % Immature Granulocytes 0.4   Nucleated RBCs 0   Absolute Neutrophil 3.5   Absolute Lymphocytes 1.0   Absolute Monocytes 0.4   Absolute Eosinophils 0.0   Absolute Basophils 0.0   Abs Immature Granulocytes 0.0   Absolute Nucleated RBC 0.0       Assessment and Plan:  1. Thrombocytopenia secondary to ITP, improving  Platelets have slowly up-trended (63 on 10/23, 161 on 10/26). Today they continue to improve at 210. Given prior negative work-up with Dr. Wirght other than celiac disease and additional negative work-up now in the hospital, most likely diagnosis is ITP. This has responded well to traditional treatments of IVIG and now high dose prednisone. Given his counts have improved, we can start a prednisone taper as follows:  -60mg daily for one week  -Recheck labs next Tuesday 11/7. If plt stable, can decrease to 40mg daily for one week  -Recheck labs Tuesday 11/14. If plt stable, can decrease to 20mg daily  -Follow-up with Dr. Wright on 11/17 with labs    Patient prefers to have his labs drawn at his home PCP clinic. Will fax orders and can access results on care everywhere.     2. Elevated serum transaminases  Patient noted to have increase in AST/ALT on today's labs. Alk phos and Bili normal. No symptoms and viral serologies negative. Most likely etiology at this time is drug or alcohol induced. Instructed him to stop all alcohol, avoid tylenol, NSAIDs, and ASA, and we will recheck the next two weeks to ensure resolution. Did discuss monitoring for signs of hepatitis.    3. Hypertension, likely steroid induced  Creat normal. Initial /106, recheck 138/100. Asymptomatic. Likely  his elevated BP from his high dose prednisone. Monitor as he tapers down.    4. ID  Viral serologies negative. Given pentamidine inpatient for PCP ppx. No further ID concerns.    Clay Calvert PA-C    The patient was seen in conjunction with Clay Calvert PA-C who served as a scribe for today's visit. I have reviewed and edited the above note, and agree with the above findings and plan.    Alka Benson PA-C  [facility] [address] [address]  [phone]

## 2017-11-07 ENCOUNTER — CARE COORDINATION (OUTPATIENT)
Dept: ONCOLOGY | Facility: CLINIC | Age: 27
End: 2017-11-07

## 2017-11-07 NOTE — PROGRESS NOTES
Called Roberto to review today's labs completed at his local [facility] clinic.  His platelets were 162 (down from 210 here on 10/31) but stable from his labs in [facility] on 10/26 of 161.    Alka recommends continuing with the prednisone taper to 40mg tomorrow.  He will have labs again next Tuesday 11/14/17.  He denies any bleeding or concerns at this time.

## 2017-11-14 ENCOUNTER — CARE COORDINATION (OUTPATIENT)
Dept: ONCOLOGY | Facility: CLINIC | Age: 27
End: 2017-11-14

## 2017-11-15 ENCOUNTER — TELEPHONE (OUTPATIENT)
Dept: ONCOLOGY | Facility: CLINIC | Age: 27
End: 2017-11-15

## 2017-11-15 NOTE — TELEPHONE ENCOUNTER
Oncology Distress Screening Follow-up  Clinical Social Work  [facility]    Identified Concern and Score From Distress Screening: How concerned are you about feeling anxious or very scared? : 8    Date of Distress Screening: 10/31/17    Data: Pt is 27 year old male diagnosed with thrombocytopenia.    Intervention: Call was placed to the pt and message left to discuss his distress tool screens.    Education Provided: none    Follow-up Required: will await for the pt to return our call.      ORESTES Dietz, [facility]  Phone [phone]  Pager [phone]

## 2017-11-20 ENCOUNTER — ONCOLOGY VISIT (OUTPATIENT)
Dept: ONCOLOGY | Facility: CLINIC | Age: 27
End: 2017-11-20
Attending: INTERNAL MEDICINE
Payer: COMMERCIAL

## 2017-11-20 ENCOUNTER — APPOINTMENT (OUTPATIENT)
Dept: LAB | Facility: CLINIC | Age: 27
End: 2017-11-20
Attending: INTERNAL MEDICINE
Payer: COMMERCIAL

## 2017-11-20 VITALS
OXYGEN SATURATION: 97 % | SYSTOLIC BLOOD PRESSURE: 145 MMHG | RESPIRATION RATE: 16 BRPM | BODY MASS INDEX: 25.06 KG/M2 | HEART RATE: 86 BPM | WEIGHT: 179.6 LBS | DIASTOLIC BLOOD PRESSURE: 88 MMHG | TEMPERATURE: 98.1 F

## 2017-11-20 DIAGNOSIS — D69.6 THROMBOCYTOPENIA (H): ICD-10-CM

## 2017-11-20 DIAGNOSIS — R76.8 LOW IMMUNOGLOBULIN LEVEL: Primary | ICD-10-CM

## 2017-11-20 DIAGNOSIS — R74.01 TRANSAMINITIS: ICD-10-CM

## 2017-11-20 DIAGNOSIS — E61.1 IRON DEFICIENCY: ICD-10-CM

## 2017-11-20 LAB
ALBUMIN SERPL-MCNC: 4.2 G/DL (ref 3.4–5)
ALP SERPL-CCNC: 54 U/L (ref 40–150)
ALT SERPL W P-5'-P-CCNC: 71 U/L (ref 0–70)
AST SERPL W P-5'-P-CCNC: 24 U/L (ref 0–45)
BASOPHILS # BLD AUTO: 0 10E9/L (ref 0–0.2)
BASOPHILS NFR BLD AUTO: 0.6 %
BILIRUB DIRECT SERPL-MCNC: <0.1 MG/DL (ref 0–0.2)
BILIRUB SERPL-MCNC: 0.6 MG/DL (ref 0.2–1.3)
DIFFERENTIAL METHOD BLD: NORMAL
EOSINOPHIL # BLD AUTO: 0.1 10E9/L (ref 0–0.7)
EOSINOPHIL NFR BLD AUTO: 1 %
ERYTHROCYTE [DISTWIDTH] IN BLOOD BY AUTOMATED COUNT: 13.2 % (ref 10–15)
HCT VFR BLD AUTO: 46.8 % (ref 40–53)
HGB BLD-MCNC: 16.2 G/DL (ref 13.3–17.7)
IMM GRANULOCYTES # BLD: 0 10E9/L (ref 0–0.4)
IMM GRANULOCYTES NFR BLD: 0.4 %
LYMPHOCYTES # BLD AUTO: 1.4 10E9/L (ref 0.8–5.3)
LYMPHOCYTES NFR BLD AUTO: 27.1 %
MCH RBC QN AUTO: 29.9 PG (ref 26.5–33)
MCHC RBC AUTO-ENTMCNC: 34.6 G/DL (ref 31.5–36.5)
MCV RBC AUTO: 87 FL (ref 78–100)
MONOCYTES # BLD AUTO: 0.3 10E9/L (ref 0–1.3)
MONOCYTES NFR BLD AUTO: 5.5 %
NEUTROPHILS # BLD AUTO: 3.3 10E9/L (ref 1.6–8.3)
NEUTROPHILS NFR BLD AUTO: 65.4 %
NRBC # BLD AUTO: 0 10*3/UL
NRBC BLD AUTO-RTO: 0 /100
PLATELET # BLD AUTO: 160 10E9/L (ref 150–450)
PROT SERPL-MCNC: 7.5 G/DL (ref 6.8–8.8)
RBC # BLD AUTO: 5.41 10E12/L (ref 4.4–5.9)
WBC # BLD AUTO: 5.1 10E9/L (ref 4–11)

## 2017-11-20 PROCEDURE — 99212 OFFICE O/P EST SF 10 MIN: CPT | Mod: ZF

## 2017-11-20 PROCEDURE — 99214 OFFICE O/P EST MOD 30 MIN: CPT | Mod: ZP | Performed by: INTERNAL MEDICINE

## 2017-11-20 PROCEDURE — 85025 COMPLETE CBC W/AUTO DIFF WBC: CPT | Performed by: PHYSICIAN ASSISTANT

## 2017-11-20 PROCEDURE — 80076 HEPATIC FUNCTION PANEL: CPT | Performed by: PHYSICIAN ASSISTANT

## 2017-11-20 PROCEDURE — 36415 COLL VENOUS BLD VENIPUNCTURE: CPT

## 2017-11-20 ASSESSMENT — PAIN SCALES - GENERAL: PAINLEVEL: NO PAIN (0)

## 2017-11-20 NOTE — NURSING NOTE
"Oncology Rooming Note    November 20, 2017 3:19 PM   Roberto Spence is a 27 year old male who presents for:    Chief Complaint   Patient presents with     Labs Only     vitals checked, venipuncture     Oncology Clinic Visit     Return for Thrombocytopneia      Initial Vitals: /88  Pulse 86  Temp 98.1  F (36.7  C)  Resp 16  Wt 81.5 kg (179 lb 9.6 oz)  SpO2 97%  BMI 25.06 kg/m2 Estimated body mass index is 25.06 kg/(m^2) as calculated from the following:    Height as of 10/31/17: 1.803 m (5' 10.98\").    Weight as of this encounter: 81.5 kg (179 lb 9.6 oz). Body surface area is 2.02 meters squared.  No Pain (0) Comment: Data Unavailable   No LMP for male patient.  Allergies reviewed: Yes  Medications reviewed: Yes    Medications: MEDICATION REFILLS NEEDED TODAY. Provider was notified.  Pharmacy name entered into [facility]:    [facility] [address] [place] [address] [address] [place] [address]    Clinical concerns: results  Kyle was notified.  6   minutes for nursing intake (face to face time)     Beronica Gasca MA              "

## 2017-11-20 NOTE — LETTER
11/20/2017       RE: Roberto Casiano  [address]  [facility] [address]     Dear Colleague,    Thank you for referring your patient, Roberto Casiano, to the [facility]. Please see a copy of my visit note below.    PROBLEM LIST:  1) ITP- presented with platelet count of 1k on 10/20/17. Responsive to prednisone and IvIg  2) leukopenia, thrombocytopenia since at least 2012- Bone marrow bx on 12/5/12 show hypocellular marrow 20-40%, no dysplasia, normal cytogenetics, normal flow cytometry.   2) mild iron deficiency  3) anxiety  4) Celiac disease, diagnosed June 2015     Interim History: Mr. Casiano is here for f/u of ITP. He presented with bleeding mouth sores. Responded to high dose steroids and IvIG 1 g/kg x 2. On prednisone taper, currently 20 mg daily. He says he is feeling quite well and that the prednisone gives him a lot of energy. No more bleeding symptoms.      PHYSICAL EXAMINATION:  /88  Pulse 86  Temp 98.1  F (36.7  C)  Resp 16  Wt 81.5 kg (179 lb 9.6 oz)  SpO2 97%  BMI 25.06 kg/m2    General appearance:  Patient is 28 yo man in no acute distress.     HEENT:  No pallor, icterus, or mucositis.     Lymph nodes:  No cervical, supraclavicular, axillary, or inguinal lymphadenopathy.   Lungs:  Clear to auscultation bilaterally.   Heart:  Regular rate and rhythm; no S3 S4 or murmer.     Abdomen:  Positive bowel sounds, soft and nontender, nondistended.  No hepatomegaly. No splenomegaly appreciated.    Extremities:  No joint swelling or tenderness.  No ankle edema.     Skin:  No rash, no petechiae or ecchymoses.    Labs:  Results for ROBERTO CASIANO (MRN [id number]) as of 11/20/2017 18:16   Ref. Range 10/22/2017 05:15 10/31/2017 14:49 11/20/2017 14:46   Albumin Latest Ref Range: 3.4 - 5.0 g/dL  4.0 4.2   Protein Total Latest Ref Range: 6.8 - 8.8 g/dL  8.2 7.5   Bilirubin Total Latest Ref Range: 0.2 - 1.3 mg/dL  0.5 0.6   Alkaline Phosphatase Latest Ref Range: 40 - 150 U/L  66 54   ALT  Latest Ref Range: 0 - 70 U/L  332 (H) 71 (H)   AST Latest Ref Range: 0 - 45 U/L  161 (H) 24   Bilirubin Direct Latest Ref Range: 0.0 - 0.2 mg/dL   <0.1   Glucose Latest Ref Range: 70 - 99 mg/dL  139 (H)    WBC Latest Ref Range: 4.0 - 11.0 10e9/L 3.8 (L) 4.9 5.1   Hemoglobin Latest Ref Range: 13.3 - 17.7 g/dL 15.0 15.5 16.2   Hematocrit Latest Ref Range: 40.0 - 53.0 % 43.2 46.5 46.8   Platelet Count Latest Ref Range: 150 - 450 10e9/L 19 (LL) 210 160   RBC Count Latest Ref Range: 4.4 - 5.9 10e12/L 5.14 5.35 5.41   MCV Latest Ref Range: 78 - 100 fl 84 87 87   MCH Latest Ref Range: 26.5 - 33.0 pg 29.2 29.0 29.9   MCHC Latest Ref Range: 31.5 - 36.5 g/dL 34.7 33.3 34.6   RDW Latest Ref Range: 10.0 - 15.0 % 13.1 13.2 13.2   Diff Method Unknown Automated Method Automated Method Automated Method   % Neutrophils Latest Units: % 73.7 71.4 65.4   % Lymphocytes Latest Units: % 16.3 20.8 27.1   % Monocytes Latest Units: % 9.4 7.2 5.5   % Eosinophils Latest Units: % 0.0 0.0 1.0   % Basophils Latest Units: % 0.3 0.2 0.6   % Immature Granulocytes Latest Units: % 0.3 0.4 0.4   Nucleated RBCs Latest Ref Range: 0 /100 0 0 0   Absolute Neutrophil Latest Ref Range: 1.6 - 8.3 10e9/L 2.8 3.5 3.3   Absolute Lymphocytes Latest Ref Range: 0.8 - 5.3 10e9/L 0.6 (L) 1.0 1.4   Absolute Monocytes Latest Ref Range: 0.0 - 1.3 10e9/L 0.4 0.4 0.3   Absolute Eosinophils Latest Ref Range: 0.0 - 0.7 10e9/L 0.0 0.0 0.1   Absolute Basophils Latest Ref Range: 0.0 - 0.2 10e9/L 0.0 0.0 0.0     Assessment and plan:  1. ITP-I discussed with Mr. Spence that this will probably become a chronic problem. My goal is to have his platelet count above 30, that is usually good enough to prevent any significant bleeding. I don't know yet as we wean the prednisone if he is going to drop below 30. If he is chronically below 30 when not on prednisone then I would recommend trying rituximab. The response rates rituximab is about 6 out of 10 patients have a good partial or  complete remission that may last from months to years. Alternatively splenectomy could be considered, the short-term response to that is about 6 out of 10 patients have a good partial or complete remission but the long-term responses only about 3 out of 10. Therefore I do not tend to recommend splenectomy unless other things are working. IVIG can be effective but only lasts for about a month's and repeated IVIG infusions are not practical for most patients. Promacta can also be used but is expensive and also has side effects such as diarrhea and rash, so I usually don't recommend that unless other agents have not been effective. For now we will do a prednisone taper and see from there what happens.  -. Decrease prednisone to 10 mg daily for 2 weeks, then 5 mg daily  -Return to our clinic or primary care clinic in one week for labs and to also have vaccinations for pneumococcus, Haemophilus, and meningococcus just in case he eventually goes for a splenectomy    2. Elevated liver enzymes-resolved. I'm not quite liver enzymes were high when he came into the hospital. Hepatitis A, B, and C are all negative as his HIV.    3. Low quantitative immunoglobulins-he has not had problems with frequent infections so this is noted but nothing specific needs to be done.    Return to clinic in 1 month.    I spent 30 minutes with the patient , of which >50 % was in counseling.    Madelin Wright MD  Hematology

## 2017-11-20 NOTE — PATIENT INSTRUCTIONS
Decreased prednisone to 1/2 tablet (10 mg ) a day for 2 weeks, then 5 mg daily (Call here for smaller tablets)   Get labs and immunizations next week- can do at primary care clinic, then labs every 2 weeks.

## 2017-11-20 NOTE — PROGRESS NOTES
PROBLEM LIST:  1) ITP- presented with platelet count of 1k on 10/20/17. Responsive to prednisone and IvIg  2) leukopenia, thrombocytopenia since at least 2012- Bone marrow bx on 12/5/12 show hypocellular marrow 20-40%, no dysplasia, normal cytogenetics, normal flow cytometry.   2) mild iron deficiency  3) anxiety  4) Celiac disease, diagnosed June 2015     Interim History: Mr. Casiano is here for f/u of ITP. He presented with bleeding mouth sores. Responded to high dose steroids and IvIG 1 g/kg x 2. On prednisone taper, currently 20 mg daily. He says he is feeling quite well and that the prednisone gives him a lot of energy. No more bleeding symptoms.      PHYSICAL EXAMINATION:  /88  Pulse 86  Temp 98.1  F (36.7  C)  Resp 16  Wt 81.5 kg (179 lb 9.6 oz)  SpO2 97%  BMI 25.06 kg/m2    General appearance:  Patient is 28 yo man in no acute distress.     HEENT:  No pallor, icterus, or mucositis.     Lymph nodes:  No cervical, supraclavicular, axillary, or inguinal lymphadenopathy.   Lungs:  Clear to auscultation bilaterally.   Heart:  Regular rate and rhythm; no S3 S4 or murmer.     Abdomen:  Positive bowel sounds, soft and nontender, nondistended.  No hepatomegaly. No splenomegaly appreciated.    Extremities:  No joint swelling or tenderness.  No ankle edema.     Skin:  No rash, no petechiae or ecchymoses.    Labs:  Results for BEVERLY CASIANO (MRN [id number]) as of 11/20/2017 18:16   Ref. Range 10/22/2017 05:15 10/31/2017 14:49 11/20/2017 14:46   Albumin Latest Ref Range: 3.4 - 5.0 g/dL  4.0 4.2   Protein Total Latest Ref Range: 6.8 - 8.8 g/dL  8.2 7.5   Bilirubin Total Latest Ref Range: 0.2 - 1.3 mg/dL  0.5 0.6   Alkaline Phosphatase Latest Ref Range: 40 - 150 U/L  66 54   ALT Latest Ref Range: 0 - 70 U/L  332 (H) 71 (H)   AST Latest Ref Range: 0 - 45 U/L  161 (H) 24   Bilirubin Direct Latest Ref Range: 0.0 - 0.2 mg/dL   <0.1   Glucose Latest Ref Range: 70 - 99 mg/dL  139 (H)    WBC Latest Ref Range: 4.0 -  11.0 10e9/L 3.8 (L) 4.9 5.1   Hemoglobin Latest Ref Range: 13.3 - 17.7 g/dL 15.0 15.5 16.2   Hematocrit Latest Ref Range: 40.0 - 53.0 % 43.2 46.5 46.8   Platelet Count Latest Ref Range: 150 - 450 10e9/L 19 (LL) 210 160   RBC Count Latest Ref Range: 4.4 - 5.9 10e12/L 5.14 5.35 5.41   MCV Latest Ref Range: 78 - 100 fl 84 87 87   MCH Latest Ref Range: 26.5 - 33.0 pg 29.2 29.0 29.9   MCHC Latest Ref Range: 31.5 - 36.5 g/dL 34.7 33.3 34.6   RDW Latest Ref Range: 10.0 - 15.0 % 13.1 13.2 13.2   Diff Method Unknown Automated Method Automated Method Automated Method   % Neutrophils Latest Units: % 73.7 71.4 65.4   % Lymphocytes Latest Units: % 16.3 20.8 27.1   % Monocytes Latest Units: % 9.4 7.2 5.5   % Eosinophils Latest Units: % 0.0 0.0 1.0   % Basophils Latest Units: % 0.3 0.2 0.6   % Immature Granulocytes Latest Units: % 0.3 0.4 0.4   Nucleated RBCs Latest Ref Range: 0 /100 0 0 0   Absolute Neutrophil Latest Ref Range: 1.6 - 8.3 10e9/L 2.8 3.5 3.3   Absolute Lymphocytes Latest Ref Range: 0.8 - 5.3 10e9/L 0.6 (L) 1.0 1.4   Absolute Monocytes Latest Ref Range: 0.0 - 1.3 10e9/L 0.4 0.4 0.3   Absolute Eosinophils Latest Ref Range: 0.0 - 0.7 10e9/L 0.0 0.0 0.1   Absolute Basophils Latest Ref Range: 0.0 - 0.2 10e9/L 0.0 0.0 0.0     Assessment and plan:  1. ITP-I discussed with Mr. Spence that this will probably become a chronic problem. My goal is to have his platelet count above 30, that is usually good enough to prevent any significant bleeding. I don't know yet as we wean the prednisone if he is going to drop below 30. If he is chronically below 30 when not on prednisone then I would recommend trying rituximab. The response rates rituximab is about 6 out of 10 patients have a good partial or complete remission that may last from months to years. Alternatively splenectomy could be considered, the short-term response to that is about 6 out of 10 patients have a good partial or complete remission but the long-term responses  only about 3 out of 10. Therefore I do not tend to recommend splenectomy unless other things are working. IVIG can be effective but only lasts for about a month's and repeated IVIG infusions are not practical for most patients. Promacta can also be used but is expensive and also has side effects such as diarrhea and rash, so I usually don't recommend that unless other agents have not been effective. For now we will do a prednisone taper and see from there what happens.  -. Decrease prednisone to 10 mg daily for 2 weeks, then 5 mg daily  -Return to our clinic or primary care clinic in one week for labs and to also have vaccinations for pneumococcus, Haemophilus, and meningococcus just in case he eventually goes for a splenectomy    2. Elevated liver enzymes-resolved. I'm not quite liver enzymes were high when he came into the hospital. Hepatitis A, B, and C are all negative as his HIV.    3. Low quantitative immunoglobulins-he has not had problems with frequent infections so this is noted but nothing specific needs to be done.    Return to clinic in 1 month.    I spent 30 minutes with the patient , of which >50 % was in counseling.    Madelin Wright MD  Hematology

## 2017-11-20 NOTE — MR AVS SNAPSHOT
After Visit Summary   11/20/2017    Roberto Spence    MRN: [id number]           Patient Information     Date Of Birth          1990        Visit Information        Provider Department      11/20/2017 2:45 PM Madelin Wright MD [facility]        Today's Diagnoses     Thrombocytopenia (H)        Transaminitis          Care Instructions    Decreased prednisone to 1/2 tablet (10 mg ) a day for 2 weeks, then 5 mg daily (Call here for smaller tablets)   Get labs and immunizations next week- can do at primary care clinic, then labs every 2 weeks.           Follow-ups after your visit        Follow-up notes from your care team     Return in about 4 weeks (around 12/15/2017).      Your next 10 appointments already scheduled     Dec 22, 2017 11:30 AM CST   [facility]   [facility] Draw ([facility])    [address] [place] [address] [id number] [phone]            Dec 22, 2017 12:00 PM CST   (Arrive by 11:45 AM)   Return Visit with Madelin Wright MD   [facility] ([facility])    [address] [place] [id number] [phone]              Who to contact     If you have questions or need follow up information about today's clinic visit or your schedule please contact [facility] directly at [phone].  Normal or non-critical lab and imaging results will be communicated to you by MyChart, letter or phone within 4 business days after the clinic has received the results. If you do not hear from us within 7 days, please contact the clinic through MyChart or phone. If you have a critical or abnormal lab result, we will notify you by phone as soon as possible.  Submit refill requests through [employer] or call your pharmacy and they will forward the refill request to us. Please allow 3 business days for your  refill to be completed.          Additional Information About Your Visit        MyChart Information     [employer] gives you secure access to your electronic health record. If you see a primary care provider, you can also send messages to your care team and make appointments. If you have questions, please call your primary care clinic.  If you do not have a primary care provider, please call [phone] and they will assist you.        Care EveryWhere ID     This is your Care EveryWhere ID. This could be used by other organizations to access your [facility] medical records  [id number]        Your Vitals Were     Pulse Temperature Respirations Pulse Oximetry BMI (Body Mass Index)       86 98.1  F (36.7  C) 16 97% 25.06 kg/m2        Blood Pressure from Last 3 Encounters:   11/20/17 145/88   10/31/17 (!) 138/100   10/22/17 134/79    Weight from Last 3 Encounters:   11/20/17 81.5 kg (179 lb 9.6 oz)   10/31/17 78.5 kg (173 lb)   10/22/17 78.2 kg (172 lb 6.4 oz)              We Performed the Following     *CBC with platelets differential     Hepatic panel        Primary Care Provider Office Phone # Fax #    Wilner Feliz [phone]       [place] [id number]        Equal Access to Services     BARBARA SOLER : Hadii corey ku hadasho Soomaali, waaxda luqadaha, qaybta kaalmada adeegyada, delisa dolanin haygailn buzz sadler . So [facility] [phone].    ATENCIÓN: Si habla español, tiene a khan disposición servicios gratuitos de asistencia lingüística. Llame al [phone].    We comply with applicable federal civil rights laws and [place] laws. We do not discriminate on the basis of race, color, national origin, age, disability, sex, sexual orientation, or gender identity.            Thank you!     Thank you for choosing [facility]  for your care. Our goal is always to provide you with excellent care. Hearing back from our patients is one way we can continue  to improve our services. Please take a few minutes to complete the written survey that you may receive in the mail after your visit with us. Thank you!             Your Updated Medication List - Protect others around you: Learn how to safely use, store and throw away your medicines at www.disposemymeds.org.          This list is accurate as of: 11/20/17  3:56 PM.  Always use your most recent med list.                   Brand Name Dispense Instructions for use Diagnosis    amphetamine-dextroamphetamine 30 MG per tablet    ADDERALL    60 tablet    Take 1 tablet (30 mg) by mouth 2 times daily    Attention-deficit hyperactivity disorder, other type       buPROPion 300 MG 24 hr tablet    WELLBUTRIN XL    90 tablet    Take 1 tablet (300 mg) by mouth every morning    Major depressive disorder, single episode, mild (H)       omeprazole 20 MG CR capsule    priLOSEC    15 capsule    Take 1 capsule (20 mg) by mouth daily    Thrombocytopenia (H)       predniSONE 20 MG tablet    DELTASONE    50 tablet    Take 3 tablets (60 mg) by mouth daily    Thrombocytopenia (H)

## 2017-12-22 ENCOUNTER — ONCOLOGY VISIT (OUTPATIENT)
Dept: ONCOLOGY | Facility: CLINIC | Age: 27
End: 2017-12-22
Attending: INTERNAL MEDICINE
Payer: COMMERCIAL

## 2017-12-22 ENCOUNTER — APPOINTMENT (OUTPATIENT)
Dept: LAB | Facility: CLINIC | Age: 27
End: 2017-12-22
Attending: INTERNAL MEDICINE
Payer: COMMERCIAL

## 2017-12-22 VITALS
BODY MASS INDEX: 25.95 KG/M2 | RESPIRATION RATE: 16 BRPM | OXYGEN SATURATION: 95 % | SYSTOLIC BLOOD PRESSURE: 150 MMHG | DIASTOLIC BLOOD PRESSURE: 100 MMHG | WEIGHT: 186 LBS | HEART RATE: 67 BPM | TEMPERATURE: 98.7 F

## 2017-12-22 DIAGNOSIS — E61.1 IRON DEFICIENCY: ICD-10-CM

## 2017-12-22 DIAGNOSIS — R74.01 TRANSAMINITIS: ICD-10-CM

## 2017-12-22 DIAGNOSIS — R76.8 LOW IMMUNOGLOBULIN LEVEL: ICD-10-CM

## 2017-12-22 DIAGNOSIS — D69.3 IDIOPATHIC THROMBOCYTOPENIC PURPURA (H): Primary | ICD-10-CM

## 2017-12-22 DIAGNOSIS — D69.6 THROMBOCYTOPENIA (H): ICD-10-CM

## 2017-12-22 LAB
ALBUMIN SERPL-MCNC: 4.1 G/DL (ref 3.4–5)
ALP SERPL-CCNC: 82 U/L (ref 40–150)
ALT SERPL W P-5'-P-CCNC: 47 U/L (ref 0–70)
ANION GAP SERPL CALCULATED.3IONS-SCNC: 8 MMOL/L (ref 3–14)
AST SERPL W P-5'-P-CCNC: 28 U/L (ref 0–45)
BASOPHILS # BLD AUTO: 0 10E9/L (ref 0–0.2)
BASOPHILS NFR BLD AUTO: 0.5 %
BILIRUB SERPL-MCNC: 0.5 MG/DL (ref 0.2–1.3)
BUN SERPL-MCNC: 15 MG/DL (ref 7–30)
CALCIUM SERPL-MCNC: 8.8 MG/DL (ref 8.5–10.1)
CHLORIDE SERPL-SCNC: 104 MMOL/L (ref 94–109)
CO2 SERPL-SCNC: 28 MMOL/L (ref 20–32)
CREAT SERPL-MCNC: 0.81 MG/DL (ref 0.66–1.25)
DIFFERENTIAL METHOD BLD: NORMAL
EOSINOPHIL # BLD AUTO: 0.3 10E9/L (ref 0–0.7)
EOSINOPHIL NFR BLD AUTO: 3.5 %
ERYTHROCYTE [DISTWIDTH] IN BLOOD BY AUTOMATED COUNT: 13 % (ref 10–15)
FERRITIN SERPL-MCNC: 35 NG/ML (ref 26–388)
GFR SERPL CREATININE-BSD FRML MDRD: >90 ML/MIN/1.7M2
GLUCOSE SERPL-MCNC: 102 MG/DL (ref 70–99)
HCT VFR BLD AUTO: 49.6 % (ref 40–53)
HGB BLD-MCNC: 16.5 G/DL (ref 13.3–17.7)
IMM GRANULOCYTES # BLD: 0.1 10E9/L (ref 0–0.4)
IMM GRANULOCYTES NFR BLD: 0.7 %
LYMPHOCYTES # BLD AUTO: 2 10E9/L (ref 0.8–5.3)
LYMPHOCYTES NFR BLD AUTO: 26.9 %
MCH RBC QN AUTO: 29.1 PG (ref 26.5–33)
MCHC RBC AUTO-ENTMCNC: 33.3 G/DL (ref 31.5–36.5)
MCV RBC AUTO: 88 FL (ref 78–100)
MONOCYTES # BLD AUTO: 0.6 10E9/L (ref 0–1.3)
MONOCYTES NFR BLD AUTO: 8.3 %
NEUTROPHILS # BLD AUTO: 4.5 10E9/L (ref 1.6–8.3)
NEUTROPHILS NFR BLD AUTO: 60.1 %
NRBC # BLD AUTO: 0 10*3/UL
NRBC BLD AUTO-RTO: 0 /100
PLATELET # BLD AUTO: 201 10E9/L (ref 150–450)
POTASSIUM SERPL-SCNC: 3.8 MMOL/L (ref 3.4–5.3)
PROT SERPL-MCNC: 7.3 G/DL (ref 6.8–8.8)
RBC # BLD AUTO: 5.67 10E12/L (ref 4.4–5.9)
RETICS # AUTO: 97.5 10E9/L (ref 25–95)
RETICS/RBC NFR AUTO: 1.7 % (ref 0.5–2)
SODIUM SERPL-SCNC: 139 MMOL/L (ref 133–144)
WBC # BLD AUTO: 7.4 10E9/L (ref 4–11)

## 2017-12-22 PROCEDURE — 36415 COLL VENOUS BLD VENIPUNCTURE: CPT

## 2017-12-22 PROCEDURE — 80053 COMPREHEN METABOLIC PANEL: CPT | Performed by: INTERNAL MEDICINE

## 2017-12-22 PROCEDURE — 00000402 ZZHCL STATISTIC TOTAL PROTEIN: Performed by: INTERNAL MEDICINE

## 2017-12-22 PROCEDURE — 84165 PROTEIN E-PHORESIS SERUM: CPT | Performed by: INTERNAL MEDICINE

## 2017-12-22 PROCEDURE — 85045 AUTOMATED RETICULOCYTE COUNT: CPT | Performed by: INTERNAL MEDICINE

## 2017-12-22 PROCEDURE — 85025 COMPLETE CBC W/AUTO DIFF WBC: CPT | Performed by: INTERNAL MEDICINE

## 2017-12-22 PROCEDURE — 82728 ASSAY OF FERRITIN: CPT | Performed by: INTERNAL MEDICINE

## 2017-12-22 PROCEDURE — 99213 OFFICE O/P EST LOW 20 MIN: CPT | Mod: ZF

## 2017-12-22 PROCEDURE — 99214 OFFICE O/P EST MOD 30 MIN: CPT | Mod: ZP | Performed by: INTERNAL MEDICINE

## 2017-12-22 RX ORDER — PREDNISONE 5 MG/1
5 TABLET ORAL DAILY
Qty: 14 TABLET | Refills: 0 | Status: SHIPPED | OUTPATIENT
Start: 2017-12-22 | End: 2020-05-27

## 2017-12-22 RX ORDER — ESCITALOPRAM OXALATE 10 MG/1
10 TABLET ORAL DAILY
COMMUNITY
Start: 2017-12-15 | End: 2020-12-02

## 2017-12-22 ASSESSMENT — PAIN SCALES - GENERAL: PAINLEVEL: NO PAIN (0)

## 2017-12-22 NOTE — NURSING NOTE
Chief Complaint   Patient presents with     Blood Draw     labs drawn by vpt by rn.  vs taken.     Labs drawn by venipuncture by rn.  Vital signs taken.    Kennedi Daley RN

## 2017-12-22 NOTE — PROGRESS NOTES
PROBLEM LIST:  1) ITP- presented with platelet count of 1k on 10/20/17. Responsive to prednisone and IvIg  2) leukopenia, thrombocytopenia since at least 2012- Bone marrow bx on 12/5/12 show hypocellular marrow 20-40%, no dysplasia, normal cytogenetics, normal flow cytometry.   2) mild iron deficiency  3) anxiety  4) Celiac disease, diagnosed June 2015      Interim History: Mr. Spence is here for f/u of ITP. Currently on prednisone 10 mg daily. He has no bleeding symptoms.  He says his appetite is good but not too much.  His weight is up a bit but he says he was weighed with his coat and shoes on.  No stomach upset.  No fevers, chills, sweats, adenopathy.  He has a little bit of postnasal drip and occasional cough with some white sputum.  No shortness of breath.  No sinus pain or sore throat.  He says his blood pressure seems to been high ever since he came in with acute illness in October.  He says that his father has high blood pressure that runs on his dad's side of family.  Prior to this illness in October his blood pressure has been normal.      PHYSICAL EXAMINATION:  BP (!) 150/100 (BP Location: Right arm, Patient Position: Sitting, Cuff Size: Adult Regular)  Pulse 67  Temp 98.7  F (37.1  C) (Oral)  Resp 16  Wt 84.4 kg (186 lb)  SpO2 95%  BMI 25.95 kg/m2    General appearance:  Patient is 26 yo man  in no acute distress.     HEENT:  No pallor, icterus, or mucositis.  No thyromegaly.   Lymph nodes:  No cervical, supraclavicular, axillary, or inguinal lymphadenopathy.   Lungs:  Clear to auscultation bilaterally.   Heart:  Regular rate and rhythm; no S3 S4 or murmer.     Abdomen:  Positive bowel sounds, soft and nontender, nondistended.  No hepatomegaly. No splenomegaly appreciated.    Extremities:  No joint swelling or tenderness.  No ankle edema.     Skin:  No rash, no petechiae or ecchymoses.    Labs:  Reviewed    Assessment and Plan:  1.  Autoimmune  thrombocytopenia--his platelet remains good on lower  dose of the prednisone.  I discussed with him that it may have been a viral infection that triggered the sudden drop in his platelets.  It is hard to predict if he is going to have sudden drops in the future.  The plan for now is to decrease the prednisone to 5 mg daily for 2 weeks then stop.  He should have a CBC in 2 weeks.  2.  Elevated liver enzymes- this has resolved.  He may have had some sort of viral infection that triggered the elevated enzymes.  3.  Hypertension- he may have an inherited predisposition to hypertension and the prednisone we have made this worse.  If his blood pressure does not normalize when he is off prednisone he should see his primary care physician about starting some antihypertensive medication.    Return to clinic in 3 months.  Madelin Wright MD  Hematology

## 2017-12-22 NOTE — MR AVS SNAPSHOT
After Visit Summary   12/22/2017    Roberto Spence    MRN: [id number]           Patient Information     Date Of Birth          1990        Visit Information        Provider Department      12/22/2017 12:00 PM Madelin Wright MD [facility]        Today's Diagnoses     Idiopathic thrombocytopenic purpura (H)    -  1    Thrombocytopenia (H)        Transaminitis        Low immunoglobulin level        Iron deficiency           Follow-ups after your visit        Follow-up notes from your care team     Return in about 3 months (around 3/22/2018) for f/u Kyle.      Your next 10 appointments already scheduled     Mar 30, 2018  3:00 PM CDT   [facility] Lab Draw with  [facility] DRAW   [facility] Lab Draw ([facility])    [address] [place] [id number] [phone]            Mar 30, 2018  3:30 PM CDT   (Arrive by 3:15 PM)   Return Visit with Madelin Wright MD   [facility] ([facility])    [address] [place] [id number] [phone]              Future tests that were ordered for you today     Open Standing Orders        Priority Remaining Interval Expires Ordered    CBC with platelets differential Routine 16/16 every 2 weeks 12/22/2018 12/22/2017            Who to contact     If you have questions or need follow up information about today's clinic visit or your schedule please contact [facility] directly at [phone].  Normal or non-critical lab and imaging results will be communicated to you by MyChart, letter or phone within 4 business days after the clinic has received the results. If you do not hear from us within 7 days, please contact the clinic through MyChart or phone. If you have a critical or abnormal lab result, we will notify you by phone as soon as possible.  Submit refill requests through [employer] or call your  pharmacy and they will forward the refill request to us. Please allow 3 business days for your refill to be completed.          Additional Information About Your Visit        [employer]book[employer] Information     [employer] gives you secure access to your electronic health record. If you see a primary care provider, you can also send messages to your care team and make appointments. If you have questions, please call your primary care clinic.  If you do not have a primary care provider, please call [phone] and they will assist you.        Care EveryWhere ID     This is your Care EveryWhere ID. This could be used by other organizations to access your [facility] medical records  [id number]        Your Vitals Were     Pulse Temperature Respirations Pulse Oximetry BMI (Body Mass Index)       67 98.7  F (37.1  C) (Oral) 16 95% 25.95 kg/m2        Blood Pressure from Last 3 Encounters:   12/22/17 (!) 150/100   11/20/17 145/88   10/31/17 (!) 138/100    Weight from Last 3 Encounters:   12/22/17 84.4 kg (186 lb)   11/20/17 81.5 kg (179 lb 9.6 oz)   10/31/17 78.5 kg (173 lb)              We Performed the Following     CBC with platelets differential     Comprehensive metabolic panel     Ferritin     Protein electrophoresis     Reticulocyte count          Today's Medication Changes          These changes are accurate as of: 12/22/17 12:29 PM.  If you have any questions, ask your nurse or doctor.               These medicines have changed or have updated prescriptions.        Dose/Directions    * predniSONE 20 MG tablet   Commonly known as:  DELTASONE   This may have changed:  how much to take   Used for:  Thrombocytopenia (H)   Changed by:  Alka Benson PA        Dose:  60 mg   Take 3 tablets (60 mg) by mouth daily   Quantity:  50 tablet   Refills:  0       * predniSONE 5 MG tablet   Commonly known as:  DELTASONE   This may have changed:  You were already taking a medication with the same name, and this prescription was added.  Make sure you understand how and when to take each.   Used for:  Idiopathic thrombocytopenic purpura (H)   Changed by:  Madelin Wright MD        Dose:  5 mg   Take 1 tablet (5 mg) by mouth daily   Quantity:  14 tablet   Refills:  0       * Notice:  This list has 2 medication(s) that are the same as other medications prescribed for you. Read the directions carefully, and ask your doctor or other care provider to review them with you.         Where to get your medicines      These medications were sent to [address]  [address] [place], [place] [id number]     Phone:  [phone]     predniSONE 5 MG tablet                Primary Care Provider Office Phone # Fax #    Wilner Feliz [phone]       [facility] [address] [facility] [address]        Equal Access to Services     [facility]: Hadii corey araujo Soduncan, waaxda luqadaha, qaybta kaalmada adeshahlayafaheem, delisa sadler . So [facility] [phone].    ATENCIÓN: Si habla español, tiene a khan disposición servicios gratuitos de asistencia lingüística. Llame al [phone].    We comply with applicable federal civil rights laws and [place] laws. We do not discriminate on the basis of race, color, national origin, age, disability, sex, sexual orientation, or gender identity.            Thank you!     Thank you for choosing [facility]  for your care. Our goal is always to provide you with excellent care. Hearing back from our patients is one way we can continue to improve our services. Please take a few minutes to complete the written survey that you may receive in the mail after your visit with us. Thank you!             Your Updated Medication List - Protect others around you: Learn how to safely use, store and throw away your medicines at www.disposemymeds.org.          This list is accurate as of: 12/22/17 12:29 PM.  Always use your most  recent med list.                   Brand Name Dispense Instructions for use Diagnosis    amphetamine-dextroamphetamine 30 MG per tablet    ADDERALL    60 tablet    Take 1 tablet (30 mg) by mouth 2 times daily    Attention-deficit hyperactivity disorder, other type       buPROPion 300 MG 24 hr tablet    WELLBUTRIN XL    90 tablet    Take 1 tablet (300 mg) by mouth every morning    Major depressive disorder, single episode, mild (H)       escitalopram 10 MG tablet    LEXAPRO     Take 10 mg by mouth daily        omeprazole 20 MG CR capsule    priLOSEC    15 capsule    Take 1 capsule (20 mg) by mouth daily    Thrombocytopenia (H)       * predniSONE 20 MG tablet    DELTASONE    50 tablet    Take 3 tablets (60 mg) by mouth daily    Thrombocytopenia (H)       * predniSONE 5 MG tablet    DELTASONE    14 tablet    Take 1 tablet (5 mg) by mouth daily    Idiopathic thrombocytopenic purpura (H)       * Notice:  This list has 2 medication(s) that are the same as other medications prescribed for you. Read the directions carefully, and ask your doctor or other care provider to review them with you.

## 2017-12-22 NOTE — NURSING NOTE
"Oncology Rooming Note    December 22, 2017 11:53 AM   Roberto Spence is a 27 year old male who presents for:    Chief Complaint   Patient presents with     Blood Draw     labs drawn by vpt by rn.  vs taken.     Oncology Clinic Visit     Thrombocytopenia f/u     Initial Vitals: BP (!) 150/100 (BP Location: Right arm, Patient Position: Sitting, Cuff Size: Adult Regular)  Pulse 67  Temp 98.7  F (37.1  C) (Oral)  Resp 16  Wt 84.4 kg (186 lb)  SpO2 95%  BMI 25.95 kg/m2 Estimated body mass index is 25.95 kg/(m^2) as calculated from the following:    Height as of 10/31/17: 1.803 m (5' 10.98\").    Weight as of this encounter: 84.4 kg (186 lb). Body surface area is 2.06 meters squared.  No Pain (0) Comment: Data Unavailable   No LMP for male patient.  Allergies reviewed: Yes  Medications reviewed: Yes    Medications: MEDICATION REFILLS NEEDED TODAY. Provider was notified.  Pharmacy name entered into [facility]:    [id number] [place] [address] [id number] [place] [address]    Clinical concerns: Blood pressure readings are high. Dr Wright was notified.    10 minutes for nursing intake (face to face time)     EARL CHAUDHRY LPN            "

## 2017-12-22 NOTE — LETTER
12/22/2017       RE: Roberto Spence  [address]  [facility] [address]     Dear Colleague,    Thank you for referring your patient, Roberto Spence, to the [facility]. Please see a copy of my visit note below.    PROBLEM LIST:  1) ITP- presented with platelet count of 1k on 10/20/17. Responsive to prednisone and IvIg  2) leukopenia, thrombocytopenia since at least 2012- Bone marrow bx on 12/5/12 show hypocellular marrow 20-40%, no dysplasia, normal cytogenetics, normal flow cytometry.   2) mild iron deficiency  3) anxiety  4) Celiac disease, diagnosed June 2015      Interim History: Mr. Spence is here for f/u of ITP. Currently on prednisone 10 mg daily. He has no bleeding symptoms.  He says his appetite is good but not too much.  His weight is up a bit but he says he was weighed with his coat and shoes on.  No stomach upset.  No fevers, chills, sweats, adenopathy.  He has a little bit of postnasal drip and occasional cough with some white sputum.  No shortness of breath.  No sinus pain or sore throat.  He says his blood pressure seems to been high ever since he came in with acute illness in October.  He says that his father has high blood pressure that runs on his dad's side of family.  Prior to this illness in October his blood pressure has been normal.      PHYSICAL EXAMINATION:  BP (!) 150/100 (BP Location: Right arm, Patient Position: Sitting, Cuff Size: Adult Regular)  Pulse 67  Temp 98.7  F (37.1  C) (Oral)  Resp 16  Wt 84.4 kg (186 lb)  SpO2 95%  BMI 25.95 kg/m2    General appearance:  Patient is 28 yo man  in no acute distress.     HEENT:  No pallor, icterus, or mucositis.  No thyromegaly.   Lymph nodes:  No cervical, supraclavicular, axillary, or inguinal lymphadenopathy.   Lungs:  Clear to auscultation bilaterally.   Heart:  Regular rate and rhythm; no S3 S4 or murmer.     Abdomen:  Positive bowel sounds, soft and nontender, nondistended.  No hepatomegaly. No splenomegaly  appreciated.    Extremities:  No joint swelling or tenderness.  No ankle edema.     Skin:  No rash, no petechiae or ecchymoses.    Labs:  Reviewed    Assessment and Plan:  1.  Autoimmune  thrombocytopenia--his platelet remains good on lower dose of the prednisone.  I discussed with him that it may have been a viral infection that triggered the sudden drop in his platelets.  It is hard to predict if he is going to have sudden drops in the future.  The plan for now is to decrease the prednisone to 5 mg daily for 2 weeks then stop.  He should have a CBC in 2 weeks.  2.  Elevated liver enzymes- this has resolved.  He may have had some sort of viral infection that triggered the elevated enzymes.  3.  Hypertension- he may have an inherited predisposition to hypertension and the prednisone we have made this worse.  If his blood pressure does not normalize when he is off prednisone he should see his primary care physician about starting some antihypertensive medication.    Return to clinic in 3 months.  Madelin Wright MD  Hematology

## 2017-12-26 LAB
ALBUMIN SERPL ELPH-MCNC: 4.6 G/DL (ref 3.7–5.1)
ALPHA1 GLOB SERPL ELPH-MCNC: 0.4 G/DL (ref 0.2–0.4)
ALPHA2 GLOB SERPL ELPH-MCNC: 0.7 G/DL (ref 0.5–0.9)
B-GLOBULIN SERPL ELPH-MCNC: 0.7 G/DL (ref 0.6–1)
GAMMA GLOB SERPL ELPH-MCNC: 0.7 G/DL (ref 0.7–1.6)
M PROTEIN SERPL ELPH-MCNC: 0 G/DL
PROT PATTERN SERPL ELPH-IMP: NORMAL

## 2018-03-30 DIAGNOSIS — D69.6 THROMBOCYTOPENIA (H): ICD-10-CM

## 2018-03-30 LAB
BASOPHILS # BLD AUTO: 0 10E9/L (ref 0–0.2)
BASOPHILS NFR BLD AUTO: 0.8 %
DIFFERENTIAL METHOD BLD: ABNORMAL
EOSINOPHIL # BLD AUTO: 0.2 10E9/L (ref 0–0.7)
EOSINOPHIL NFR BLD AUTO: 4.9 %
ERYTHROCYTE [DISTWIDTH] IN BLOOD BY AUTOMATED COUNT: 12.5 % (ref 10–15)
HCT VFR BLD AUTO: 46.1 % (ref 40–53)
HGB BLD-MCNC: 15.7 G/DL (ref 13.3–17.7)
IMM GRANULOCYTES # BLD: 0 10E9/L (ref 0–0.4)
IMM GRANULOCYTES NFR BLD: 0.3 %
LYMPHOCYTES # BLD AUTO: 1.4 10E9/L (ref 0.8–5.3)
LYMPHOCYTES NFR BLD AUTO: 38.2 %
MCH RBC QN AUTO: 29.5 PG (ref 26.5–33)
MCHC RBC AUTO-ENTMCNC: 34.1 G/DL (ref 31.5–36.5)
MCV RBC AUTO: 87 FL (ref 78–100)
MONOCYTES # BLD AUTO: 0.4 10E9/L (ref 0–1.3)
MONOCYTES NFR BLD AUTO: 10.7 %
NEUTROPHILS # BLD AUTO: 1.6 10E9/L (ref 1.6–8.3)
NEUTROPHILS NFR BLD AUTO: 45.1 %
NRBC # BLD AUTO: 0 10*3/UL
NRBC BLD AUTO-RTO: 0 /100
PLATELET # BLD AUTO: 152 10E9/L (ref 150–450)
RBC # BLD AUTO: 5.33 10E12/L (ref 4.4–5.9)
WBC # BLD AUTO: 3.6 10E9/L (ref 4–11)

## 2018-03-30 PROCEDURE — 85025 COMPLETE CBC W/AUTO DIFF WBC: CPT | Performed by: PHYSICIAN ASSISTANT

## 2018-03-30 NOTE — NURSING NOTE
Chief Complaint   Patient presents with     Lab Only     labs drawn with vpt by rn.     Labs drawn with vpt by rn.  Pt tolerated well.    Adia Patel RN

## 2020-03-10 ENCOUNTER — HEALTH MAINTENANCE LETTER (OUTPATIENT)
Age: 30
End: 2020-03-10

## 2020-05-16 ENCOUNTER — HOSPITAL ENCOUNTER (EMERGENCY)
Facility: CLINIC | Age: 30
Discharge: HOME | End: 2020-05-17
Attending: EMERGENCY MEDICINE | Admitting: EMERGENCY MEDICINE
Payer: COMMERCIAL

## 2020-05-16 DIAGNOSIS — D69.3 IDIOPATHIC THROMBOCYTOPENIC PURPURA (H): Primary | ICD-10-CM

## 2020-05-16 LAB
ABO + RH BLD: NORMAL
ABO + RH BLD: NORMAL
ANION GAP SERPL CALCULATED.3IONS-SCNC: 4 MMOL/L (ref 3–14)
BASOPHILS # BLD AUTO: 0 10E9/L (ref 0–0.2)
BASOPHILS NFR BLD AUTO: 0.8 %
BLD GP AB SCN SERPL QL: NORMAL
BLD PROD TYP BPU: NORMAL
BLOOD BANK CMNT PATIENT-IMP: NORMAL
BUN SERPL-MCNC: 16 MG/DL (ref 7–30)
CALCIUM SERPL-MCNC: 8.8 MG/DL (ref 8.5–10.1)
CHLORIDE SERPL-SCNC: 107 MMOL/L (ref 94–109)
CO2 SERPL-SCNC: 28 MMOL/L (ref 20–32)
CREAT SERPL-MCNC: 0.86 MG/DL (ref 0.66–1.25)
DIFFERENTIAL METHOD BLD: ABNORMAL
EOSINOPHIL # BLD AUTO: 0.1 10E9/L (ref 0–0.7)
EOSINOPHIL NFR BLD AUTO: 3.1 %
ERYTHROCYTE [DISTWIDTH] IN BLOOD BY AUTOMATED COUNT: 12.9 % (ref 10–15)
GFR SERPL CREATININE-BSD FRML MDRD: >90 ML/MIN/{1.73_M2}
GLUCOSE SERPL-MCNC: 113 MG/DL (ref 70–99)
HCT VFR BLD AUTO: 46.3 % (ref 40–53)
HGB BLD-MCNC: 15.9 G/DL (ref 13.3–17.7)
IMM GRANULOCYTES # BLD: 0 10E9/L (ref 0–0.4)
IMM GRANULOCYTES NFR BLD: 0.6 %
LYMPHOCYTES # BLD AUTO: 1.1 10E9/L (ref 0.8–5.3)
LYMPHOCYTES NFR BLD AUTO: 29.4 %
MCH RBC QN AUTO: 29 PG (ref 26.5–33)
MCHC RBC AUTO-ENTMCNC: 34.3 G/DL (ref 31.5–36.5)
MCV RBC AUTO: 85 FL (ref 78–100)
MONOCYTES # BLD AUTO: 0.5 10E9/L (ref 0–1.3)
MONOCYTES NFR BLD AUTO: 13.2 %
NEUTROPHILS # BLD AUTO: 1.9 10E9/L (ref 1.6–8.3)
NEUTROPHILS NFR BLD AUTO: 52.9 %
NRBC # BLD AUTO: 0 10*3/UL
NRBC BLD AUTO-RTO: 0 /100
NUM BPU REQUESTED: 2
PLATELET # BLD AUTO: 2 10E9/L (ref 150–450)
POTASSIUM SERPL-SCNC: 3.7 MMOL/L (ref 3.4–5.3)
RBC # BLD AUTO: 5.48 10E12/L (ref 4.4–5.9)
SODIUM SERPL-SCNC: 139 MMOL/L (ref 133–144)
SPECIMEN EXP DATE BLD: NORMAL
WBC # BLD AUTO: 3.6 10E9/L (ref 4–11)

## 2020-05-16 PROCEDURE — 96365 THER/PROPH/DIAG IV INF INIT: CPT | Performed by: EMERGENCY MEDICINE

## 2020-05-16 PROCEDURE — 80048 BASIC METABOLIC PNL TOTAL CA: CPT | Performed by: EMERGENCY MEDICINE

## 2020-05-16 PROCEDURE — 25000132 ZZH RX MED GY IP 250 OP 250 PS 637: Performed by: EMERGENCY MEDICINE

## 2020-05-16 PROCEDURE — 25000128 H RX IP 250 OP 636: Performed by: EMERGENCY MEDICINE

## 2020-05-16 PROCEDURE — 86900 BLOOD TYPING SEROLOGIC ABO: CPT | Performed by: EMERGENCY MEDICINE

## 2020-05-16 PROCEDURE — 99291 CRITICAL CARE FIRST HOUR: CPT | Mod: Z6 | Performed by: EMERGENCY MEDICINE

## 2020-05-16 PROCEDURE — 86850 RBC ANTIBODY SCREEN: CPT | Performed by: EMERGENCY MEDICINE

## 2020-05-16 PROCEDURE — 96366 THER/PROPH/DIAG IV INF ADDON: CPT | Performed by: EMERGENCY MEDICINE

## 2020-05-16 PROCEDURE — 99284 EMERGENCY DEPT VISIT MOD MDM: CPT | Mod: 25 | Performed by: EMERGENCY MEDICINE

## 2020-05-16 PROCEDURE — 86901 BLOOD TYPING SEROLOGIC RH(D): CPT | Performed by: EMERGENCY MEDICINE

## 2020-05-16 PROCEDURE — 25000131 ZZH RX MED GY IP 250 OP 636 PS 637: Performed by: EMERGENCY MEDICINE

## 2020-05-16 PROCEDURE — 85025 COMPLETE CBC W/AUTO DIFF WBC: CPT | Performed by: EMERGENCY MEDICINE

## 2020-05-16 RX ORDER — DEXAMETHASONE 4 MG/1
40 TABLET ORAL ONCE
Status: COMPLETED | OUTPATIENT
Start: 2020-05-16 | End: 2020-05-16

## 2020-05-16 RX ORDER — DIPHENHYDRAMINE HCL 50 MG
50 CAPSULE ORAL
Status: DISCONTINUED | OUTPATIENT
Start: 2020-05-16 | End: 2020-05-17 | Stop reason: HOSPADM

## 2020-05-16 RX ORDER — DIPHENHYDRAMINE HYDROCHLORIDE 50 MG/ML
50 INJECTION INTRAMUSCULAR; INTRAVENOUS
Status: DISCONTINUED | OUTPATIENT
Start: 2020-05-16 | End: 2020-05-17 | Stop reason: HOSPADM

## 2020-05-16 RX ORDER — DIPHENHYDRAMINE HYDROCHLORIDE 50 MG/ML
50 INJECTION INTRAMUSCULAR; INTRAVENOUS ONCE
Status: COMPLETED | OUTPATIENT
Start: 2020-05-16 | End: 2020-05-16

## 2020-05-16 RX ORDER — ACETAMINOPHEN 325 MG/1
650 TABLET ORAL
Status: DISCONTINUED | OUTPATIENT
Start: 2020-05-16 | End: 2020-05-17 | Stop reason: HOSPADM

## 2020-05-16 RX ORDER — ACETAMINOPHEN 325 MG/1
650 TABLET ORAL ONCE
Status: COMPLETED | OUTPATIENT
Start: 2020-05-16 | End: 2020-05-16

## 2020-05-16 RX ORDER — METHYLPREDNISOLONE SODIUM SUCCINATE 125 MG/2ML
125 INJECTION, POWDER, LYOPHILIZED, FOR SOLUTION INTRAMUSCULAR; INTRAVENOUS
Status: DISCONTINUED | OUTPATIENT
Start: 2020-05-16 | End: 2020-05-17 | Stop reason: HOSPADM

## 2020-05-16 RX ORDER — DIPHENHYDRAMINE HCL 50 MG
50 CAPSULE ORAL ONCE
Status: COMPLETED | OUTPATIENT
Start: 2020-05-16 | End: 2020-05-16

## 2020-05-16 RX ADMIN — ACETAMINOPHEN 650 MG: 325 TABLET, FILM COATED ORAL at 19:04

## 2020-05-16 RX ADMIN — DIPHENHYDRAMINE HYDROCHLORIDE 50 MG: 50 CAPSULE ORAL at 19:04

## 2020-05-16 RX ADMIN — HUMAN IMMUNOGLOBULIN G 75 G: 40 LIQUID INTRAVENOUS at 19:48

## 2020-05-16 RX ADMIN — DEXAMETHASONE 40 MG: 4 TABLET ORAL at 19:18

## 2020-05-16 ASSESSMENT — MIFFLIN-ST. JEOR: SCORE: 1895.19

## 2020-05-16 NOTE — ED NOTES
Critical platelet results received. Dr. Roman updated.  Brigette Hansen RN on 5/16/2020 at 6:00 PM

## 2020-05-16 NOTE — PROGRESS NOTES
BRIEF HEMATOLOGY NOTE    Contacted by ED regarding Mr. Spence, 30 year-old male with history of ITP in 10/2017 (responsive to IVIG and prednisone), celiac disease, and chronic leukopenia. He was weaned off prednisone around end of 2017. He now presents with progressive petechiae on his arms and feet/distal lower extremities which started today, that reminded him of his previous episode of ITP. Has not had any issues with bleeding or bruising otherwise. CBC returned with platelets of 2, WBC 3.6.     Presentation is consistent with second episode of ITP, which generally has low bleeding risk despite platelets <10. If patient is otherwise feeling well, recommend the following:    - 1 dose of IVIG 1 g/kg in the ED.  - Pulse-dose dexamethasone 40 mg daily x 4 days (please give 2 refills in case course needs to be repeated).  - He can then be discharged and we will arrange outpatient hematology follow-up.  - Patient should return if he develops any bleeding.    Patient was discussed with Dr. Harris.    Maite Hummel MD  Hematology-Oncology Fellow, PGY4

## 2020-05-16 NOTE — ED PROVIDER NOTES
ED Provider Note  [facility]      History     Chief Complaint   Patient presents with     Rash     HPI     Roberto Spence is a 30 year old male with PMH including hx of celiac disease, ITP, chronic leukopenia of unclear etiology, ADD, and depression/anxiety who presents with recurrent petechiae.    He noticed petechiae today on his arms for the first time since his initial episode of ITP in 2017. He primarily noticed them on the anterior/flexor surfaces of his forearms. On further self-inspection, he also noticed petechiae of the feet and distal LEs. He does feel that they have progressed over the course of the 1.5hrs since he first noticed them and now presented to the ED. He notes that they appear very similar to the petechiae that appeared during his prior ITP episode. He has not noticed any other sites of involvement. Of note, petechiae during his prior ITP episode progressed rather slowly over the course of several days, ultimately culminating in development of several ecchymoses. He notes that this episode appears to have progressed much more rapidly, though he has not noticed any bruising. He denies any gum bleeding or other obvious sites of bleeding including in his stool, urine, or other mucosal sites.    He has not had any additional recent symptoms. He denies fevers, chills, headache, lightheadedness, syncope, cough, dyspnea, abdominal pain, N/V, or dysuria. He has had intermittent minor diarrhea, which is typical for him given his Celiac disease. There has been no blood.     He does not use tobacco, rarely uses alcohol, and denies any illicit drug use. He was recently titrated off of his antidepressant medications, which included wellbutrin and lexapro. His mood has been stable in this setting. He otherwise does not take any daily medications. Of note, his platelets have been stably recovered without ongoing prednisone or other maintenance therapy since shortly following his  ITP episode in 2017.     Past Medical History  Past Medical History:   Diagnosis Date     ADHD      Celiac disease 06/10/2015     Depression      Iron deficiency      Platelets decreased (H) 06/10/2015    lower than average per patient report     Past Surgical History:   Procedure Laterality Date     NO HISTORY OF SURGERY       dexamethasone (DECADRON) 4 MG tablet  amphetamine-dextroamphetamine (ADDERALL) 30 MG per tablet  buPROPion (WELLBUTRIN XL) 300 MG 24 hr tablet  escitalopram (LEXAPRO) 10 MG tablet  omeprazole (PRILOSEC) 20 MG CR capsule  predniSONE (DELTASONE) 20 MG tablet  predniSONE (DELTASONE) 5 MG tablet      Allergies   Allergen Reactions     Gluten Meal Unknown     Nsaids Unknown     Has low platelets     Past medical history, past surgical history, medications, and allergies were reviewed with the patient. Additional pertinent items: None    Family History  Family History   Problem Relation Age of Onset     Coronary Artery Disease Father      Thyroid Disease Father      Known Genetic Syndrome Brother         autisism     Chemical Addiction Paternal Grandfather      Family History Negative Mother      Diabetes No family hx of      Hypertension No family hx of      Hyperlipidemia No family hx of      Cerebrovascular Disease No family hx of      Breast Cancer No family hx of      Colon Cancer No family hx of      Prostate Cancer No family hx of      Other Cancer No family hx of      Depression No family hx of      Anxiety Disorder No family hx of      Mental Illness No family hx of      Substance Abuse No family hx of      Anesthesia Reaction No family hx of      Asthma No family hx of      Osteoporosis No family hx of      Genetic Disorder No family hx of      Obesity No family hx of      Unknown/Adopted No family hx of      Family history was reviewed with the patient. Additional pertinent items: None    Social History  Social History     Tobacco Use     Smoking status: Never Smoker     Smokeless  tobacco: Never Used   Substance Use Topics     Alcohol use: Yes     Alcohol/week: 0.0 standard drinks     Drug use: No      Social history was reviewed with the patient. Additional pertinent items: None    Review of Systems  A complete review of systems was performed with pertinent positives and negatives noted in the HPI, and all other systems negative.    Physical Exam   BP: (!) 145/101  Pulse: 93  Heart Rate: 100  Temp: 98.8  F (37.1  C)  Resp: 12  Height: 182.9 cm (6')  Weight: 90.7 kg (200 lb)  SpO2: 96 %  Physical Exam   General: Well-appearing younger male, in no acute distress.  HEENT: NCAT. PERRLA, EOMI, anicteric sclera. Hearing grossly intact. No nasal congestion or erythema. MMM, no oral lesions or evidence of bleeding.  Neck: Supple. No LAD. JVP wnl.  Lungs: Full and equal expansion. CTAB, no wheezes, rhonchi or rales.  CV: RRR. Normal S1, S2. No m/r/g.  Abd: NABS. Soft, non-tender, non-distended. No rebound or guarding.  Extremities: Warm and well-perfused. No gross malformations. No joint swelling. No edema. Petechiae, see below.  Skin: Petechiae involving both the flexor surfaces of the forearms and more so LEs below the knee and dorsal surface of the foot. Petechiae of the LE are more confluent, but patient notes that he has some skin markings that are chronic that make it difficult to determine involvement. No ecchymoses throughout skin examined.   Neuro: AOx3, answers questions appropriately. Face symmetric. Moves extremities equally and independently.      ED Course               Results for orders placed or performed during the hospital encounter of 05/16/20   CBC with platelets differential     Status: Abnormal   Result Value Ref Range    WBC 3.6 (L) 4.0 - 11.0 10e9/L    RBC Count 5.48 4.4 - 5.9 10e12/L    Hemoglobin 15.9 13.3 - 17.7 g/dL    Hematocrit 46.3 40.0 - 53.0 %    MCV 85 78 - 100 fl    MCH 29.0 26.5 - 33.0 pg    MCHC 34.3 31.5 - 36.5 g/dL    RDW 12.9 10.0 - 15.0 %    Platelet Count 2  (LL) 150 - 450 10e9/L    Diff Method Automated Method     % Neutrophils 52.9 %    % Lymphocytes 29.4 %    % Monocytes 13.2 %    % Eosinophils 3.1 %    % Basophils 0.8 %    % Immature Granulocytes 0.6 %    Nucleated RBCs 0 0 /100    Absolute Neutrophil 1.9 1.6 - 8.3 10e9/L    Absolute Lymphocytes 1.1 0.8 - 5.3 10e9/L    Absolute Monocytes 0.5 0.0 - 1.3 10e9/L    Absolute Eosinophils 0.1 0.0 - 0.7 10e9/L    Absolute Basophils 0.0 0.0 - 0.2 10e9/L    Abs Immature Granulocytes 0.0 0 - 0.4 10e9/L    Absolute Nucleated RBC 0.0    Basic metabolic panel     Status: Abnormal   Result Value Ref Range    Sodium 139 133 - 144 mmol/L    Potassium 3.7 3.4 - 5.3 mmol/L    Chloride 107 94 - 109 mmol/L    Carbon Dioxide 28 20 - 32 mmol/L    Anion Gap 4 3 - 14 mmol/L    Glucose 113 (H) 70 - 99 mg/dL    Urea Nitrogen 16 7 - 30 mg/dL    Creatinine 0.86 0.66 - 1.25 mg/dL    GFR Estimate >90 >60 mL/min/[1.73_m2]    GFR Estimate If Black >90 >60 mL/min/[1.73_m2]    Calcium 8.8 8.5 - 10.1 mg/dL   ABO/Rh type and screen     Status: None   Result Value Ref Range    ABO A     RH(D) Pos     Antibody Screen Neg     Test Valid Only At          [facility]    Specimen Expires 05/19/2020    Platelets prepare order unit     Status: None   Result Value Ref Range    Blood Component Type PLT Pheresis     Units Ordered 2      Medications   diphenhydrAMINE (BENADRYL) capsule 50 mg (has no administration in time range)     Or   diphenhydrAMINE (BENADRYL) injection 50 mg (has no administration in time range)   acetaminophen (TYLENOL) tablet 650 mg (has no administration in time range)   EPINEPHrine (ADRENALIN) kit 0.3 mg (has no administration in time range)   diphenhydrAMINE (BENADRYL) injection 50 mg (has no administration in time range)   methylPREDNISolone sodium succinate (solu-MEDROL) injection 125 mg (has no administration in time range)   famotidine (PEPCID) injection 20 mg (has no administration in  time range)   acetaminophen (TYLENOL) tablet 650 mg (650 mg Oral Given 5/16/20 1904)   diphenhydrAMINE (BENADRYL) capsule 50 mg (50 mg Oral Given 5/16/20 1904)     Or   diphenhydrAMINE (BENADRYL) injection 50 mg ( Intravenous See Alternative 5/16/20 1904)   immune globulin - sucrose free 10 % injection 75 g (0 g Intravenous Stopped 5/17/20 0059)   dexamethasone (DECADRON) tablet 40 mg (40 mg Oral Given 5/16/20 1918)        Assessments & Plan (with Medical Decision Making)     Roberto Spence is a 30 year old male with PMH including hx of celiac disease, ITP, chronic leukopenia of unclear etiology, ADD, and depression/anxiety who presents with recurrent petechiae most concerning for recurrent ITP. Platelets returned at 2. Additional labs unremarkable. Case was discussed with hematology and patient was recommended for IVIG 1g/kg once and oral dexamethasone 40mg x 4 days with close outpatient follow-up with benign hematology with possible continuing dexamethasone (up to 12 day course) if platelets remained unrecovered.    Plan:  - IVIG 1g/kg  - Dexamethasone 40mg PO x 4 days; 2 refills  - Follow-up with benign hematology this week to arrange for platelet recheck  - Instructed regarding activity precautions  - Return to ED if evidence of ongoing bleeding  - Discharge home     I have reviewed the nursing notes. I have reviewed the findings, diagnosis, plan and need for follow up with the patient.    New Prescriptions    DEXAMETHASONE (DECADRON) 4 MG TABLET    Take 10 tablets (40 mg) by mouth daily for 4 days        Critical Care Addendum    My initial assessment, based on my review of vital signs, focused history, physical exam and discussion with hematology, established that Roberto Spence has severe hemorrhage, which requires immediate intervention, and therefore he is critically ill.     After the initial assessment, the care team initiated multiple lab tests, initiated medication therapy with IVIG and consulted with  hematology to provide stabilization care. Due to the critical nature of this patient, I reassessed physical exam multiple times prior to his disposition.     Time also spent performing documentation, reviewing test results, discussion with consultants and coordination of care.     Critical care time (excluding teaching time and procedures): 30 minutes.       Final diagnoses:   Idiopathic thrombocytopenic purpura (H)       --  Lul Winter MD   Emergency Medicine   [facility], [facility] EMERGENCY DEPARTMENT  5/16/2020     Benji Ernandez MD  05/17/20 0114

## 2020-05-16 NOTE — ED AVS SNAPSHOT
[facility], [place], Emergency Department  [address] [facility] [address]  Phone:  [phone]                                    Roberto Spence   MRN: [id number]    Department:  [facility], [place], Emergency Department   Date of Visit:  5/16/2020           After Visit Summary Signature Page    I have received my discharge instructions, and my questions have been answered. I have discussed any challenges I see with this plan with the nurse or doctor.    ..........................................................................................................................................  Patient/Patient Representative Signature      ..........................................................................................................................................  Patient Representative Print Name and Relationship to Patient    ..................................................               ................................................  Date                                   Time    ..........................................................................................................................................  Reviewed by Signature/Title    ...................................................              ..............................................  Date                                               Time          22EPIC Rev 08/18

## 2020-05-17 VITALS
WEIGHT: 200 LBS | DIASTOLIC BLOOD PRESSURE: 93 MMHG | OXYGEN SATURATION: 95 % | TEMPERATURE: 98.2 F | SYSTOLIC BLOOD PRESSURE: 135 MMHG | HEART RATE: 82 BPM | HEIGHT: 72 IN | RESPIRATION RATE: 12 BRPM | BODY MASS INDEX: 27.09 KG/M2

## 2020-05-17 RX ORDER — DEXAMETHASONE 4 MG/1
40 TABLET ORAL DAILY
Qty: 40 TABLET | Refills: 2 | Status: SHIPPED | OUTPATIENT
Start: 2020-05-17 | End: 2020-05-27

## 2020-05-18 DIAGNOSIS — D69.6 THROMBOCYTOPENIA (H): ICD-10-CM

## 2020-05-18 DIAGNOSIS — D69.6 THROMBOCYTOPENIA (H): Primary | ICD-10-CM

## 2020-05-18 LAB
ALBUMIN SERPL-MCNC: 3.8 G/DL (ref 3.4–5)
ALP SERPL-CCNC: 89 U/L (ref 40–150)
ALT SERPL W P-5'-P-CCNC: 72 U/L (ref 0–70)
ANION GAP SERPL CALCULATED.3IONS-SCNC: 6 MMOL/L (ref 3–14)
AST SERPL W P-5'-P-CCNC: 25 U/L (ref 0–45)
BASOPHILS # BLD AUTO: 0 10E9/L (ref 0–0.2)
BASOPHILS NFR BLD AUTO: 0.2 %
BILIRUB SERPL-MCNC: 0.4 MG/DL (ref 0.2–1.3)
BUN SERPL-MCNC: 12 MG/DL (ref 7–30)
CALCIUM SERPL-MCNC: 8.8 MG/DL (ref 8.5–10.1)
CHLORIDE SERPL-SCNC: 108 MMOL/L (ref 94–109)
CO2 SERPL-SCNC: 24 MMOL/L (ref 20–32)
CREAT SERPL-MCNC: 0.68 MG/DL (ref 0.66–1.25)
DIFFERENTIAL METHOD BLD: ABNORMAL
EOSINOPHIL # BLD AUTO: 0 10E9/L (ref 0–0.7)
EOSINOPHIL NFR BLD AUTO: 0 %
ERYTHROCYTE [DISTWIDTH] IN BLOOD BY AUTOMATED COUNT: 13.3 % (ref 10–15)
GFR SERPL CREATININE-BSD FRML MDRD: >90 ML/MIN/{1.73_M2}
GLUCOSE SERPL-MCNC: 142 MG/DL (ref 70–99)
HCT VFR BLD AUTO: 46.1 % (ref 40–53)
HGB BLD-MCNC: 15.6 G/DL (ref 13.3–17.7)
IMM GRANULOCYTES # BLD: 0 10E9/L (ref 0–0.4)
IMM GRANULOCYTES NFR BLD: 0.3 %
LYMPHOCYTES # BLD AUTO: 0.8 10E9/L (ref 0.8–5.3)
LYMPHOCYTES NFR BLD AUTO: 13.1 %
MCH RBC QN AUTO: 29.2 PG (ref 26.5–33)
MCHC RBC AUTO-ENTMCNC: 33.8 G/DL (ref 31.5–36.5)
MCV RBC AUTO: 86 FL (ref 78–100)
MONOCYTES # BLD AUTO: 0.3 10E9/L (ref 0–1.3)
MONOCYTES NFR BLD AUTO: 5.4 %
NEUTROPHILS # BLD AUTO: 5.2 10E9/L (ref 1.6–8.3)
NEUTROPHILS NFR BLD AUTO: 81 %
NRBC # BLD AUTO: 0 10*3/UL
NRBC BLD AUTO-RTO: 0 /100
PLATELET # BLD AUTO: 49 10E9/L (ref 150–450)
POTASSIUM SERPL-SCNC: 3.7 MMOL/L (ref 3.4–5.3)
PROT SERPL-MCNC: 8.3 G/DL (ref 6.8–8.8)
RBC # BLD AUTO: 5.34 10E12/L (ref 4.4–5.9)
RETICS # AUTO: 105.2 10E9/L (ref 25–95)
RETICS/RBC NFR AUTO: 2 % (ref 0.5–2)
SODIUM SERPL-SCNC: 139 MMOL/L (ref 133–144)
WBC # BLD AUTO: 6.4 10E9/L (ref 4–11)

## 2020-05-18 PROCEDURE — 36415 COLL VENOUS BLD VENIPUNCTURE: CPT | Performed by: INTERNAL MEDICINE

## 2020-05-18 PROCEDURE — 80053 COMPREHEN METABOLIC PANEL: CPT | Performed by: INTERNAL MEDICINE

## 2020-05-18 PROCEDURE — 85025 COMPLETE CBC W/AUTO DIFF WBC: CPT | Performed by: INTERNAL MEDICINE

## 2020-05-18 PROCEDURE — 85045 AUTOMATED RETICULOCYTE COUNT: CPT | Performed by: INTERNAL MEDICINE

## 2020-05-18 NOTE — NURSING NOTE
Chief Complaint   Patient presents with     Blood Draw     No orders. Labs drawn via  by RN in lab.      No lab orders. JIC tubes sent to first floor lab at [facility]. RED gel, purple, and green. Labs drawn via venipuncture.    Sarah Rocha RN

## 2020-05-22 DIAGNOSIS — D69.3 IDIOPATHIC THROMBOCYTOPENIC PURPURA (H): Primary | ICD-10-CM

## 2020-05-26 VITALS
WEIGHT: 212.9 LBS | RESPIRATION RATE: 16 BRPM | DIASTOLIC BLOOD PRESSURE: 102 MMHG | BODY MASS INDEX: 28.87 KG/M2 | HEART RATE: 65 BPM | TEMPERATURE: 98.4 F | SYSTOLIC BLOOD PRESSURE: 144 MMHG | OXYGEN SATURATION: 95 %

## 2020-05-26 DIAGNOSIS — D69.3 IDIOPATHIC THROMBOCYTOPENIC PURPURA (H): ICD-10-CM

## 2020-05-26 LAB
ALBUMIN SERPL-MCNC: 3.8 G/DL (ref 3.4–5)
ALP SERPL-CCNC: 75 U/L (ref 40–150)
ALT SERPL W P-5'-P-CCNC: 140 U/L (ref 0–70)
ANION GAP SERPL CALCULATED.3IONS-SCNC: 9 MMOL/L (ref 3–14)
AST SERPL W P-5'-P-CCNC: 66 U/L (ref 0–45)
BASOPHILS # BLD AUTO: 0 10E9/L (ref 0–0.2)
BASOPHILS NFR BLD AUTO: 0.2 %
BILIRUB SERPL-MCNC: 0.6 MG/DL (ref 0.2–1.3)
BUN SERPL-MCNC: 27 MG/DL (ref 7–30)
CALCIUM SERPL-MCNC: 9 MG/DL (ref 8.5–10.1)
CHLORIDE SERPL-SCNC: 103 MMOL/L (ref 94–109)
CO2 SERPL-SCNC: 24 MMOL/L (ref 20–32)
CREAT SERPL-MCNC: 0.84 MG/DL (ref 0.66–1.25)
DIFFERENTIAL METHOD BLD: ABNORMAL
EOSINOPHIL # BLD AUTO: 0.1 10E9/L (ref 0–0.7)
EOSINOPHIL NFR BLD AUTO: 1.8 %
ERYTHROCYTE [DISTWIDTH] IN BLOOD BY AUTOMATED COUNT: 13.1 % (ref 10–15)
FERRITIN SERPL-MCNC: 129 NG/ML (ref 26–388)
GFR SERPL CREATININE-BSD FRML MDRD: >90 ML/MIN/{1.73_M2}
GLUCOSE SERPL-MCNC: 97 MG/DL (ref 70–99)
HCT VFR BLD AUTO: 49.2 % (ref 40–53)
HGB BLD-MCNC: 16.6 G/DL (ref 13.3–17.7)
IMM GRANULOCYTES # BLD: 0.1 10E9/L (ref 0–0.4)
IMM GRANULOCYTES NFR BLD: 1.4 %
LYMPHOCYTES # BLD AUTO: 1.7 10E9/L (ref 0.8–5.3)
LYMPHOCYTES NFR BLD AUTO: 33.7 %
MCH RBC QN AUTO: 29 PG (ref 26.5–33)
MCHC RBC AUTO-ENTMCNC: 33.7 G/DL (ref 31.5–36.5)
MCV RBC AUTO: 86 FL (ref 78–100)
MONOCYTES # BLD AUTO: 0.7 10E9/L (ref 0–1.3)
MONOCYTES NFR BLD AUTO: 14.2 %
NEUTROPHILS # BLD AUTO: 2.4 10E9/L (ref 1.6–8.3)
NEUTROPHILS NFR BLD AUTO: 48.7 %
NRBC # BLD AUTO: 0 10*3/UL
NRBC BLD AUTO-RTO: 0 /100
PLATELET # BLD AUTO: 31 10E9/L (ref 150–450)
POTASSIUM SERPL-SCNC: 4.1 MMOL/L (ref 3.4–5.3)
PROT SERPL-MCNC: 7.6 G/DL (ref 6.8–8.8)
RBC # BLD AUTO: 5.72 10E12/L (ref 4.4–5.9)
RETICS # AUTO: 121.8 10E9/L (ref 25–95)
RETICS/RBC NFR AUTO: 2.1 % (ref 0.5–2)
SODIUM SERPL-SCNC: 136 MMOL/L (ref 133–144)
TSH SERPL DL<=0.005 MIU/L-ACNC: 2.83 MU/L (ref 0.4–4)
VIT B12 SERPL-MCNC: 401 PG/ML (ref 193–986)
WBC # BLD AUTO: 5 10E9/L (ref 4–11)

## 2020-05-26 PROCEDURE — 80053 COMPREHEN METABOLIC PANEL: CPT | Performed by: INTERNAL MEDICINE

## 2020-05-26 PROCEDURE — 82607 VITAMIN B-12: CPT | Performed by: INTERNAL MEDICINE

## 2020-05-26 PROCEDURE — 85045 AUTOMATED RETICULOCYTE COUNT: CPT | Performed by: INTERNAL MEDICINE

## 2020-05-26 PROCEDURE — 82728 ASSAY OF FERRITIN: CPT | Performed by: INTERNAL MEDICINE

## 2020-05-26 PROCEDURE — 36415 COLL VENOUS BLD VENIPUNCTURE: CPT | Performed by: INTERNAL MEDICINE

## 2020-05-26 PROCEDURE — 84443 ASSAY THYROID STIM HORMONE: CPT | Performed by: INTERNAL MEDICINE

## 2020-05-26 PROCEDURE — 85025 COMPLETE CBC W/AUTO DIFF WBC: CPT | Performed by: INTERNAL MEDICINE

## 2020-05-26 ASSESSMENT — PAIN SCALES - GENERAL: PAINLEVEL: NO PAIN (0)

## 2020-05-26 NOTE — PROGRESS NOTES
"Roberto Spence is a 30 year old male who is being evaluated via a billable video visit.      The patient has been notified of following:     \"This video visit will be conducted via a call between you and your physician/provider. We have found that certain health care needs can be provided without the need for an in-person physical exam.  This service lets us provide the care you need with a video conversation.  If a prescription is necessary we can send it directly to your pharmacy.  If lab work is needed we can place an order for that and you can then stop by our lab to have the test done at a later time.    Video visits are billed at different rates depending on your insurance coverage.  Please reach out to your insurance provider with any questions.    If during the course of the call the physician/provider feels a video visit is not appropriate, you will not be charged for this service.\"    Patient has given verbal consent for Video visit? Yes    How would you like to obtain your AVS? Satyahart    Patient would like the video invitation sent by: Send to e-mail at: [email or web address]    Will anyone else be joining your video visit? No         I have reviewed and updated the patient's allergies and medication list.    Concerns: No new concerns.   Refills: None needed.       Fabi Gardiner CMA        Video-Visit Details    Type of service:  Video Visit    Video Start Time: 10:31 am  Video End Time:  10:48 am    Originating Location (pt. Location): Home    Distant Location (provider location):  [facility] CANCER [facility]     Platform used for Video Visit: [facility]      Hematology clinic visit- ED Follow up  Video visit- confirmed consent with patient    PROBLEM LIST:  1) ITP- presented with platelet count of 1k on 10/20/17. Responsive to prednisone and IvIg. In remission for >2 years, relapsed 5/16/20.   2) leukopenia, thrombocytopenia since at least 2012- Bone marrow bx on 12/5/12 show hypocellular marrow " 20-40%, no dysplasia, normal cytogenetics, normal flow cytometry.   2) h/o mild iron deficiency  3) anxiety  4) Celiac disease, diagnosed June 2015      Interim History: Mr. Casiano is seen by video visit for f/u of ITP. he is feeling well. He reports that the petechiae are gone. He has a small bruise that he says is from bumping his arm. He does not recall any sort of illness that may have triggered the relapse in the ITP.  He has been extremely careful about avoid gluten. He says he did not like the dexamethasone- it made him nauseated and emotionally labile, and would prefer not to take that again. He says when he was on prednisone in past, that did not bother him.   Currently no fever, chills sweats, anorexia, cough, wheezing, chest pain, joint pain. No abdominal pain or diarrhea. He notes that his blood pressure has been high lately.  He has not had problems with frequent infections, such as sinusitis or bronchitis.     He is currently working from home as a , and says he has been quite busy, because everyone is refinancing. He is not going out much, wearing a mask, social distancing.       PHYSICAL EXAMINATION:  There were no vitals taken for this visit.    General appearance:  Patient is well appearing 30 year old man.     HEENT:  No pallor, icterus, or mucositis.    Lungs:  Respirations normal, no coughing. No audible wheezing.   Skin:  No rash, no petechiae on face, neck, forearms, hands. He has one ~ 1 cm ecchymosis on R forearm.    The rest of a comprehensive physical examination is deferred due to public health emergency video visit restrictions.      Labs:  Results for BEVERLY CASIANO (MRN [id number]) as of 5/26/2020 18:37   Ref. Range 5/26/2020 09:38   Sodium Latest Ref Range: 133 - 144 mmol/L 136   Potassium Latest Ref Range: 3.4 - 5.3 mmol/L 4.1   Chloride Latest Ref Range: 94 - 109 mmol/L 103   Carbon Dioxide Latest Ref Range: 20 - 32 mmol/L 24   Urea Nitrogen Latest Ref Range: 7 - 30  mg/dL 27   Creatinine Latest Ref Range: 0.66 - 1.25 mg/dL 0.84   GFR Estimate Latest Ref Range: >60 mL/min/1.73_m2 >90   GFR Estimate If Black Latest Ref Range: >60 mL/min/1.73_m2 >90   Calcium Latest Ref Range: 8.5 - 10.1 mg/dL 9.0   Anion Gap Latest Ref Range: 3 - 14 mmol/L 9   Albumin Latest Ref Range: 3.4 - 5.0 g/dL 3.8   Protein Total Latest Ref Range: 6.8 - 8.8 g/dL 7.6   Bilirubin Total Latest Ref Range: 0.2 - 1.3 mg/dL 0.6   Alkaline Phosphatase Latest Ref Range: 40 - 150 U/L 75   ALT Latest Ref Range: 0 - 70 U/L 140 (H)   AST Latest Ref Range: 0 - 45 U/L 66 (H)   Ferritin Latest Ref Range: 26 - 388 ng/mL 129   TSH Latest Ref Range: 0.40 - 4.00 mU/L 2.83   Vitamin B12 Latest Ref Range: 193 - 986 pg/mL 401   Glucose Latest Ref Range: 70 - 99 mg/dL 97   WBC Latest Ref Range: 4.0 - 11.0 10e9/L 5.0   Hemoglobin Latest Ref Range: 13.3 - 17.7 g/dL 16.6   Hematocrit Latest Ref Range: 40.0 - 53.0 % 49.2   Platelet Count Latest Ref Range: 150 - 450 10e9/L 31 (LL)   RBC Count Latest Ref Range: 4.4 - 5.9 10e12/L 5.72   MCV Latest Ref Range: 78 - 100 fl 86   MCH Latest Ref Range: 26.5 - 33.0 pg 29.0   MCHC Latest Ref Range: 31.5 - 36.5 g/dL 33.7   RDW Latest Ref Range: 10.0 - 15.0 % 13.1   Diff Method Unknown Automated Method   % Neutrophils Latest Units: % 48.7   % Lymphocytes Latest Units: % 33.7   % Monocytes Latest Units: % 14.2   % Eosinophils Latest Units: % 1.8   % Basophils Latest Units: % 0.2   % Immature Granulocytes Latest Units: % 1.4   Nucleated RBCs Latest Ref Range: 0 /100 0   Absolute Neutrophil Latest Ref Range: 1.6 - 8.3 10e9/L 2.4   Absolute Lymphocytes Latest Ref Range: 0.8 - 5.3 10e9/L 1.7   Absolute Monocytes Latest Ref Range: 0.0 - 1.3 10e9/L 0.7   Absolute Eosinophils Latest Ref Range: 0.0 - 0.7 10e9/L 0.1   Absolute Basophils Latest Ref Range: 0.0 - 0.2 10e9/L 0.0   Abs Immature Granulocytes Latest Ref Range: 0 - 0.4 10e9/L 0.1   Absolute Nucleated RBC Unknown 0.0   % Retic Latest Ref Range: 0.5  - 2.0 % 2.1 (H)   Absolute Retic Latest Ref Range: 25 - 95 10e9/L 121.8 (H)       Assessment and Recommendation:  1. ITP- relapsed. Platelet count is falling back down. He did not tolerate the dexamethasone very well, so will switch to prednisone 80 mg po daily. I am hopeful that he can be weaned fairly quickly. If not responding to the prednisone, he can be given another 1-2 doses of IvIg for short term management. I discussed with him the possibility of using rituximab, if he is not responding, or cannot be weaned to <10 mg prednisone. Will wait to see if he needs PCP prophylaxis; hope to wean fairly quickly. Needs stomach protection    2. Elevated liver enzymes- unclear reason- since considering rituximab, will check hep B and C, along with HIV    3. Elevated retic- He has normal Hgb and denies sx of Gi hemorrhage. He may have low level hemolysis. Check LAVELL and haptoglobin. Has been checked for PNH in past, so unlikely.    -Prednisone 80 mg daily  -famotidine 20 mg daily  -Weekly CBCDP- at [facility]  -CMP, HIV, HepB Surface AB, Surface Ag, and Core AB, HCV in one week  RTC 6 weeks- video visit    Video visit  Start; 10:31  Stop:10:48  Total video time 17 minutes    Madelin Wright MD  Hematology  Hematology

## 2020-05-27 ENCOUNTER — VIRTUAL VISIT (OUTPATIENT)
Dept: ONCOLOGY | Facility: CLINIC | Age: 30
End: 2020-05-27
Attending: INTERNAL MEDICINE
Payer: COMMERCIAL

## 2020-05-27 DIAGNOSIS — D69.3 IDIOPATHIC THROMBOCYTOPENIC PURPURA (H): Primary | ICD-10-CM

## 2020-05-27 PROCEDURE — 40000114 ZZH STATISTIC NO CHARGE CLINIC VISIT

## 2020-05-27 PROCEDURE — 99214 OFFICE O/P EST MOD 30 MIN: CPT | Mod: 95 | Performed by: INTERNAL MEDICINE

## 2020-05-27 RX ORDER — PREDNISONE 20 MG/1
80 TABLET ORAL DAILY
Qty: 120 TABLET | Refills: 1 | Status: SHIPPED | OUTPATIENT
Start: 2020-05-27 | End: 2020-11-04

## 2020-05-27 RX ORDER — FAMOTIDINE 20 MG/1
20 TABLET, FILM COATED ORAL 2 TIMES DAILY
Qty: 60 TABLET | Refills: 3 | Status: SHIPPED | OUTPATIENT
Start: 2020-05-27 | End: 2020-12-02

## 2020-05-27 ASSESSMENT — PAIN SCALES - GENERAL: PAINLEVEL: NO PAIN (0)

## 2020-05-27 NOTE — LETTER
"  5/27/2020       RE: Roberto Spence  [address]  [facility] [address]      Dear Colleague,    Thank you for referring your patient, Roberto Spence, to the [facility]. Please see a copy of my visit note below.    Roberto Spence is a 30 year old male who is being evaluated via a billable video visit.      The patient has been notified of following:     \"This video visit will be conducted via a call between you and your physician/provider. We have found that certain health care needs can be provided without the need for an in-person physical exam.  This service lets us provide the care you need with a video conversation.  If a prescription is necessary we can send it directly to your pharmacy.  If lab work is needed we can place an order for that and you can then stop by our lab to have the test done at a later time.    Video visits are billed at different rates depending on your insurance coverage.  Please reach out to your insurance provider with any questions.    If during the course of the call the physician/provider feels a video visit is not appropriate, you will not be charged for this service.\"    Patient has given verbal consent for Video visit? Yes    How would you like to obtain your AVS? MyChart    Patient would like the video invitation sent by: Send to e-mail at: [email or web address]    Will anyone else be joining your video visit? No         I have reviewed and updated the patient's allergies and medication list.    Concerns: No new concerns.   Refills: None needed.       Fabi Gardiner CMA        Video-Visit Details    Type of service:  Video Visit    Video Start Time: 10:31 am  Video End Time:  10:48 am    Originating Location (pt. Location): Home    Distant Location (provider location):  [facility]     Platform used for Video Visit: [facility]      Hematology clinic visit- ED Follow up  Video visit- confirmed consent with patient    PROBLEM LIST:  1) " ITP- presented with platelet count of 1k on 10/20/17. Responsive to prednisone and IvIg. In remission for >2 years, relapsed 5/16/20.   2) leukopenia, thrombocytopenia since at least 2012- Bone marrow bx on 12/5/12 show hypocellular marrow 20-40%, no dysplasia, normal cytogenetics, normal flow cytometry.   2) h/o mild iron deficiency  3) anxiety  4) Celiac disease, diagnosed June 2015      Interim History: Mr. Casiano is seen by video visit for f/u of ITP. he is feeling well. He reports that the petechiae are gone. He has a small bruise that he says is from bumping his arm. He does not recall any sort of illness that may have triggered the relapse in the ITP.  He has been extremely careful about avoid gluten. He says he did not like the dexamethasone- it made him nauseated and emotionally labile, and would prefer not to take that again. He says when he was on prednisone in past, that did not bother him.   Currently no fever, chills sweats, anorexia, cough, wheezing, chest pain, joint pain. No abdominal pain or diarrhea. He notes that his blood pressure has been high lately.  He has not had problems with frequent infections, such as sinusitis or bronchitis.     He is currently working from home as a , and says he has been quite busy, because everyone is refinancing. He is not going out much, wearing a mask, social distancing.       PHYSICAL EXAMINATION:  There were no vitals taken for this visit.    General appearance:  Patient is well appearing 30 year old man.     HEENT:  No pallor, icterus, or mucositis.    Lungs:  Respirations normal, no coughing. No audible wheezing.   Skin:  No rash, no petechiae on face, neck, forearms, hands. He has one ~ 1 cm ecchymosis on R forearm.    The rest of a comprehensive physical examination is deferred due to public health emergency video visit restrictions.      Labs:  Results for BEVERLY CASIANO (MRN [id number]) as of 5/26/2020 18:37   Ref. Range 5/26/2020 09:38    Sodium Latest Ref Range: 133 - 144 mmol/L 136   Potassium Latest Ref Range: 3.4 - 5.3 mmol/L 4.1   Chloride Latest Ref Range: 94 - 109 mmol/L 103   Carbon Dioxide Latest Ref Range: 20 - 32 mmol/L 24   Urea Nitrogen Latest Ref Range: 7 - 30 mg/dL 27   Creatinine Latest Ref Range: 0.66 - 1.25 mg/dL 0.84   GFR Estimate Latest Ref Range: >60 mL/min/1.73_m2 >90   GFR Estimate If Black Latest Ref Range: >60 mL/min/1.73_m2 >90   Calcium Latest Ref Range: 8.5 - 10.1 mg/dL 9.0   Anion Gap Latest Ref Range: 3 - 14 mmol/L 9   Albumin Latest Ref Range: 3.4 - 5.0 g/dL 3.8   Protein Total Latest Ref Range: 6.8 - 8.8 g/dL 7.6   Bilirubin Total Latest Ref Range: 0.2 - 1.3 mg/dL 0.6   Alkaline Phosphatase Latest Ref Range: 40 - 150 U/L 75   ALT Latest Ref Range: 0 - 70 U/L 140 (H)   AST Latest Ref Range: 0 - 45 U/L 66 (H)   Ferritin Latest Ref Range: 26 - 388 ng/mL 129   TSH Latest Ref Range: 0.40 - 4.00 mU/L 2.83   Vitamin B12 Latest Ref Range: 193 - 986 pg/mL 401   Glucose Latest Ref Range: 70 - 99 mg/dL 97   WBC Latest Ref Range: 4.0 - 11.0 10e9/L 5.0   Hemoglobin Latest Ref Range: 13.3 - 17.7 g/dL 16.6   Hematocrit Latest Ref Range: 40.0 - 53.0 % 49.2   Platelet Count Latest Ref Range: 150 - 450 10e9/L 31 (LL)   RBC Count Latest Ref Range: 4.4 - 5.9 10e12/L 5.72   MCV Latest Ref Range: 78 - 100 fl 86   MCH Latest Ref Range: 26.5 - 33.0 pg 29.0   MCHC Latest Ref Range: 31.5 - 36.5 g/dL 33.7   RDW Latest Ref Range: 10.0 - 15.0 % 13.1   Diff Method Unknown Automated Method   % Neutrophils Latest Units: % 48.7   % Lymphocytes Latest Units: % 33.7   % Monocytes Latest Units: % 14.2   % Eosinophils Latest Units: % 1.8   % Basophils Latest Units: % 0.2   % Immature Granulocytes Latest Units: % 1.4   Nucleated RBCs Latest Ref Range: 0 /100 0   Absolute Neutrophil Latest Ref Range: 1.6 - 8.3 10e9/L 2.4   Absolute Lymphocytes Latest Ref Range: 0.8 - 5.3 10e9/L 1.7   Absolute Monocytes Latest Ref Range: 0.0 - 1.3 10e9/L 0.7   Absolute  Eosinophils Latest Ref Range: 0.0 - 0.7 10e9/L 0.1   Absolute Basophils Latest Ref Range: 0.0 - 0.2 10e9/L 0.0   Abs Immature Granulocytes Latest Ref Range: 0 - 0.4 10e9/L 0.1   Absolute Nucleated RBC Unknown 0.0   % Retic Latest Ref Range: 0.5 - 2.0 % 2.1 (H)   Absolute Retic Latest Ref Range: 25 - 95 10e9/L 121.8 (H)       Assessment and Recommendation:  1. ITP- relapsed. Platelet count is falling back down. He did not tolerate the dexamethasone very well, so will switch to prednisone 80 mg po daily. I am hopeful that he can be weaned fairly quickly. If not responding to the prednisone, he can be given another 1-2 doses of IvIg for short term management. I discussed with him the possibility of using rituximab, if he is not responding, or cannot be weaned to <10 mg prednisone. Will wait to see if he needs PCP prophylaxis; hope to wean fairly quickly. Needs stomach protection    2. Elevated liver enzymes- unclear reason- since considering rituximab, will check hep B and C, along with HIV    3. Elevated retic- He has normal Hgb and denies sx of Gi hemorrhage. He may have low level hemolysis. Check LAVELL and haptoglobin. Has been checked for PNH in past, so unlikely.    -Prednisone 80 mg daily  -famotidine 20 mg daily  -Weekly CBCDP- at [facility]  -CMP, HIV, HepB Surface AB, Surface Ag, and Core AB, HCV in one week  RTC 6 weeks- video visit    Video visit  Start; 10:31  Stop:10:48  Total video time 17 minutes    Madelin Wright MD  Hematology  Hematology

## 2020-06-03 DIAGNOSIS — D69.3 IDIOPATHIC THROMBOCYTOPENIC PURPURA (H): ICD-10-CM

## 2020-06-03 LAB
ALBUMIN SERPL-MCNC: 3.8 G/DL (ref 3.4–5)
ALP SERPL-CCNC: 65 U/L (ref 40–150)
ALT SERPL W P-5'-P-CCNC: 82 U/L (ref 0–70)
ANION GAP SERPL CALCULATED.3IONS-SCNC: 5 MMOL/L (ref 3–14)
AST SERPL W P-5'-P-CCNC: 20 U/L (ref 0–45)
BASOPHILS # BLD AUTO: 0 10E9/L (ref 0–0.2)
BASOPHILS NFR BLD AUTO: 0.3 %
BILIRUB SERPL-MCNC: 0.5 MG/DL (ref 0.2–1.3)
BUN SERPL-MCNC: 16 MG/DL (ref 7–30)
CALCIUM SERPL-MCNC: 9 MG/DL (ref 8.5–10.1)
CHLORIDE SERPL-SCNC: 106 MMOL/L (ref 94–109)
CO2 SERPL-SCNC: 28 MMOL/L (ref 20–32)
CREAT SERPL-MCNC: 0.74 MG/DL (ref 0.66–1.25)
DAT POLY-SP REAG RBC QL: NORMAL
DIFFERENTIAL METHOD BLD: NORMAL
EOSINOPHIL # BLD AUTO: 0 10E9/L (ref 0–0.7)
EOSINOPHIL NFR BLD AUTO: 0.3 %
ERYTHROCYTE [DISTWIDTH] IN BLOOD BY AUTOMATED COUNT: 13 % (ref 10–15)
GFR SERPL CREATININE-BSD FRML MDRD: >90 ML/MIN/{1.73_M2}
GLUCOSE SERPL-MCNC: 90 MG/DL (ref 70–99)
HAPTOGLOB SERPL-MCNC: 37 MG/DL (ref 32–197)
HCT VFR BLD AUTO: 47.1 % (ref 40–53)
HGB BLD-MCNC: 15.9 G/DL (ref 13.3–17.7)
IMM GRANULOCYTES # BLD: 0 10E9/L (ref 0–0.4)
IMM GRANULOCYTES NFR BLD: 0.5 %
LYMPHOCYTES # BLD AUTO: 2.2 10E9/L (ref 0.8–5.3)
LYMPHOCYTES NFR BLD AUTO: 28 %
MCH RBC QN AUTO: 29.2 PG (ref 26.5–33)
MCHC RBC AUTO-ENTMCNC: 33.8 G/DL (ref 31.5–36.5)
MCV RBC AUTO: 87 FL (ref 78–100)
MONOCYTES # BLD AUTO: 0.6 10E9/L (ref 0–1.3)
MONOCYTES NFR BLD AUTO: 7 %
NEUTROPHILS # BLD AUTO: 5 10E9/L (ref 1.6–8.3)
NEUTROPHILS NFR BLD AUTO: 63.9 %
NRBC # BLD AUTO: 0 10*3/UL
NRBC BLD AUTO-RTO: 0 /100
PLATELET # BLD AUTO: 162 10E9/L (ref 150–450)
POTASSIUM SERPL-SCNC: 3.4 MMOL/L (ref 3.4–5.3)
PROT SERPL-MCNC: 7.2 G/DL (ref 6.8–8.8)
RBC # BLD AUTO: 5.44 10E12/L (ref 4.4–5.9)
RETICS # AUTO: 132.7 10E9/L (ref 25–95)
RETICS/RBC NFR AUTO: 2.4 % (ref 0.5–2)
SODIUM SERPL-SCNC: 139 MMOL/L (ref 133–144)
WBC # BLD AUTO: 7.8 10E9/L (ref 4–11)

## 2020-06-03 PROCEDURE — 85025 COMPLETE CBC W/AUTO DIFF WBC: CPT | Performed by: INTERNAL MEDICINE

## 2020-06-03 PROCEDURE — 83010 ASSAY OF HAPTOGLOBIN QUANT: CPT | Performed by: INTERNAL MEDICINE

## 2020-06-03 PROCEDURE — 40000611 ZZHCL STATISTIC MORPHOLOGY W/INTERP HEMEPATH TC 85060: Performed by: INTERNAL MEDICINE

## 2020-06-03 PROCEDURE — 86880 COOMBS TEST DIRECT: CPT | Performed by: INTERNAL MEDICINE

## 2020-06-03 PROCEDURE — 85045 AUTOMATED RETICULOCYTE COUNT: CPT | Performed by: INTERNAL MEDICINE

## 2020-06-03 PROCEDURE — 80053 COMPREHEN METABOLIC PANEL: CPT | Performed by: INTERNAL MEDICINE

## 2020-06-03 NOTE — NURSING NOTE
Chief Complaint   Patient presents with     Labs Only     Labs drawn via venipuncture by RN in lab     There were no vitals taken for this visit.    Labs collected and sent from left antecubital venipuncture in lab by RN. Pt tolerated well.     Nayana Cain RN

## 2020-06-04 LAB — COPATH REPORT: NORMAL

## 2020-06-09 DIAGNOSIS — D69.3 IDIOPATHIC THROMBOCYTOPENIC PURPURA (H): ICD-10-CM

## 2020-06-09 LAB
BASOPHILS # BLD AUTO: 0 10E9/L (ref 0–0.2)
BASOPHILS NFR BLD AUTO: 0.4 %
DIFFERENTIAL METHOD BLD: NORMAL
EOSINOPHIL # BLD AUTO: 0.1 10E9/L (ref 0–0.7)
EOSINOPHIL NFR BLD AUTO: 0.9 %
ERYTHROCYTE [DISTWIDTH] IN BLOOD BY AUTOMATED COUNT: 13 % (ref 10–15)
HCT VFR BLD AUTO: 48.5 % (ref 40–53)
HGB BLD-MCNC: 16.2 G/DL (ref 13.3–17.7)
IMM GRANULOCYTES # BLD: 0 10E9/L (ref 0–0.4)
IMM GRANULOCYTES NFR BLD: 0.6 %
LYMPHOCYTES # BLD AUTO: 2.7 10E9/L (ref 0.8–5.3)
LYMPHOCYTES NFR BLD AUTO: 39 %
MCH RBC QN AUTO: 29.1 PG (ref 26.5–33)
MCHC RBC AUTO-ENTMCNC: 33.4 G/DL (ref 31.5–36.5)
MCV RBC AUTO: 87 FL (ref 78–100)
MONOCYTES # BLD AUTO: 0.7 10E9/L (ref 0–1.3)
MONOCYTES NFR BLD AUTO: 9.9 %
NEUTROPHILS # BLD AUTO: 3.3 10E9/L (ref 1.6–8.3)
NEUTROPHILS NFR BLD AUTO: 49.2 %
NRBC # BLD AUTO: 0 10*3/UL
NRBC BLD AUTO-RTO: 0 /100
PLATELET # BLD AUTO: 153 10E9/L (ref 150–450)
RBC # BLD AUTO: 5.57 10E12/L (ref 4.4–5.9)
WBC # BLD AUTO: 6.8 10E9/L (ref 4–11)

## 2020-06-09 PROCEDURE — 85025 COMPLETE CBC W/AUTO DIFF WBC: CPT | Performed by: INTERNAL MEDICINE

## 2020-06-09 PROCEDURE — 36415 COLL VENOUS BLD VENIPUNCTURE: CPT | Performed by: INTERNAL MEDICINE

## 2020-06-09 NOTE — NURSING NOTE
Chief Complaint   Patient presents with     Blood Draw     Labs drawn via  by RN in lab.     Labs collected from venipuncture by RN.     Criselda Parisi RN

## 2020-06-17 DIAGNOSIS — D69.3 IDIOPATHIC THROMBOCYTOPENIC PURPURA (H): ICD-10-CM

## 2020-06-17 LAB
BASOPHILS # BLD AUTO: 0 10E9/L (ref 0–0.2)
BASOPHILS NFR BLD AUTO: 0.3 %
DIFFERENTIAL METHOD BLD: NORMAL
EOSINOPHIL # BLD AUTO: 0 10E9/L (ref 0–0.7)
EOSINOPHIL NFR BLD AUTO: 0.1 %
ERYTHROCYTE [DISTWIDTH] IN BLOOD BY AUTOMATED COUNT: 12.9 % (ref 10–15)
HCT VFR BLD AUTO: 48.1 % (ref 40–53)
HGB BLD-MCNC: 16.1 G/DL (ref 13.3–17.7)
IMM GRANULOCYTES # BLD: 0.1 10E9/L (ref 0–0.4)
IMM GRANULOCYTES NFR BLD: 0.6 %
LYMPHOCYTES # BLD AUTO: 2 10E9/L (ref 0.8–5.3)
LYMPHOCYTES NFR BLD AUTO: 21 %
MCH RBC QN AUTO: 29.5 PG (ref 26.5–33)
MCHC RBC AUTO-ENTMCNC: 33.5 G/DL (ref 31.5–36.5)
MCV RBC AUTO: 88 FL (ref 78–100)
MONOCYTES # BLD AUTO: 0.8 10E9/L (ref 0–1.3)
MONOCYTES NFR BLD AUTO: 7.7 %
NEUTROPHILS # BLD AUTO: 6.8 10E9/L (ref 1.6–8.3)
NEUTROPHILS NFR BLD AUTO: 70.3 %
NRBC # BLD AUTO: 0 10*3/UL
NRBC BLD AUTO-RTO: 0 /100
PLATELET # BLD AUTO: 164 10E9/L (ref 150–450)
RBC # BLD AUTO: 5.46 10E12/L (ref 4.4–5.9)
WBC # BLD AUTO: 9.7 10E9/L (ref 4–11)

## 2020-06-17 PROCEDURE — 85025 COMPLETE CBC W/AUTO DIFF WBC: CPT | Performed by: INTERNAL MEDICINE

## 2020-06-17 NOTE — NURSING NOTE
Chief Complaint   Patient presents with     Blood Draw     labs drawn by venipuncture by rn in lab.  lab only appointment.     Kennedi Daley RN

## 2020-06-29 ENCOUNTER — PATIENT OUTREACH (OUTPATIENT)
Dept: ONCOLOGY | Facility: CLINIC | Age: 30
End: 2020-06-29

## 2020-06-29 DIAGNOSIS — D69.3 IDIOPATHIC THROMBOCYTOPENIC PURPURA (H): ICD-10-CM

## 2020-06-29 LAB
BASOPHILS # BLD AUTO: 0 10E9/L (ref 0–0.2)
BASOPHILS NFR BLD AUTO: 0.5 %
DIFFERENTIAL METHOD BLD: ABNORMAL
EOSINOPHIL # BLD AUTO: 0 10E9/L (ref 0–0.7)
EOSINOPHIL NFR BLD AUTO: 0.4 %
ERYTHROCYTE [DISTWIDTH] IN BLOOD BY AUTOMATED COUNT: 13.2 % (ref 10–15)
HCT VFR BLD AUTO: 50.2 % (ref 40–53)
HGB BLD-MCNC: 16.7 G/DL (ref 13.3–17.7)
IMM GRANULOCYTES # BLD: 0.1 10E9/L (ref 0–0.4)
IMM GRANULOCYTES NFR BLD: 0.9 %
LYMPHOCYTES # BLD AUTO: 2.5 10E9/L (ref 0.8–5.3)
LYMPHOCYTES NFR BLD AUTO: 31.5 %
MCH RBC QN AUTO: 29.5 PG (ref 26.5–33)
MCHC RBC AUTO-ENTMCNC: 33.3 G/DL (ref 31.5–36.5)
MCV RBC AUTO: 89 FL (ref 78–100)
MONOCYTES # BLD AUTO: 0.6 10E9/L (ref 0–1.3)
MONOCYTES NFR BLD AUTO: 6.9 %
NEUTROPHILS # BLD AUTO: 4.8 10E9/L (ref 1.6–8.3)
NEUTROPHILS NFR BLD AUTO: 59.8 %
NRBC # BLD AUTO: 0 10*3/UL
NRBC BLD AUTO-RTO: 0 /100
PLATELET # BLD AUTO: 141 10E9/L (ref 150–450)
RBC # BLD AUTO: 5.67 10E12/L (ref 4.4–5.9)
WBC # BLD AUTO: 8 10E9/L (ref 4–11)

## 2020-06-29 PROCEDURE — 85025 COMPLETE CBC W/AUTO DIFF WBC: CPT | Performed by: INTERNAL MEDICINE

## 2020-06-29 PROCEDURE — 36415 COLL VENOUS BLD VENIPUNCTURE: CPT | Performed by: INTERNAL MEDICINE

## 2020-06-29 NOTE — NURSING NOTE
Chief Complaint   Patient presents with     Blood Draw     lab only appointment.  labs drawn by venipuncture by rn in lab.      Kennedi Daley RN

## 2020-07-06 DIAGNOSIS — D69.3 IDIOPATHIC THROMBOCYTOPENIC PURPURA (H): ICD-10-CM

## 2020-07-06 LAB
BASOPHILS # BLD AUTO: 0 10E9/L (ref 0–0.2)
BASOPHILS NFR BLD AUTO: 0.4 %
DIFFERENTIAL METHOD BLD: ABNORMAL
EOSINOPHIL # BLD AUTO: 0 10E9/L (ref 0–0.7)
EOSINOPHIL NFR BLD AUTO: 0.5 %
ERYTHROCYTE [DISTWIDTH] IN BLOOD BY AUTOMATED COUNT: 12.7 % (ref 10–15)
HCT VFR BLD AUTO: 49.4 % (ref 40–53)
HGB BLD-MCNC: 16.6 G/DL (ref 13.3–17.7)
IMM GRANULOCYTES # BLD: 0.1 10E9/L (ref 0–0.4)
IMM GRANULOCYTES NFR BLD: 0.8 %
LYMPHOCYTES # BLD AUTO: 1.9 10E9/L (ref 0.8–5.3)
LYMPHOCYTES NFR BLD AUTO: 25 %
MCH RBC QN AUTO: 29.3 PG (ref 26.5–33)
MCHC RBC AUTO-ENTMCNC: 33.6 G/DL (ref 31.5–36.5)
MCV RBC AUTO: 87 FL (ref 78–100)
MONOCYTES # BLD AUTO: 0.8 10E9/L (ref 0–1.3)
MONOCYTES NFR BLD AUTO: 10.5 %
NEUTROPHILS # BLD AUTO: 4.8 10E9/L (ref 1.6–8.3)
NEUTROPHILS NFR BLD AUTO: 62.8 %
NRBC # BLD AUTO: 0 10*3/UL
NRBC BLD AUTO-RTO: 0 /100
PLATELET # BLD AUTO: 135 10E9/L (ref 150–450)
RBC # BLD AUTO: 5.66 10E12/L (ref 4.4–5.9)
WBC # BLD AUTO: 7.6 10E9/L (ref 4–11)

## 2020-07-06 PROCEDURE — 85025 COMPLETE CBC W/AUTO DIFF WBC: CPT | Performed by: INTERNAL MEDICINE

## 2020-07-06 PROCEDURE — 36415 COLL VENOUS BLD VENIPUNCTURE: CPT | Performed by: INTERNAL MEDICINE

## 2020-07-20 DIAGNOSIS — D69.3 IDIOPATHIC THROMBOCYTOPENIC PURPURA (H): ICD-10-CM

## 2020-07-20 LAB
BASOPHILS # BLD AUTO: 0 10E9/L (ref 0–0.2)
BASOPHILS NFR BLD AUTO: 0.8 %
DIFFERENTIAL METHOD BLD: ABNORMAL
EOSINOPHIL # BLD AUTO: 0.2 10E9/L (ref 0–0.7)
EOSINOPHIL NFR BLD AUTO: 3.1 %
ERYTHROCYTE [DISTWIDTH] IN BLOOD BY AUTOMATED COUNT: 12.8 % (ref 10–15)
HCT VFR BLD AUTO: 49.9 % (ref 40–53)
HGB BLD-MCNC: 16.8 G/DL (ref 13.3–17.7)
IMM GRANULOCYTES # BLD: 0 10E9/L (ref 0–0.4)
IMM GRANULOCYTES NFR BLD: 0.2 %
LYMPHOCYTES # BLD AUTO: 2 10E9/L (ref 0.8–5.3)
LYMPHOCYTES NFR BLD AUTO: 41 %
MCH RBC QN AUTO: 29.3 PG (ref 26.5–33)
MCHC RBC AUTO-ENTMCNC: 33.7 G/DL (ref 31.5–36.5)
MCV RBC AUTO: 87 FL (ref 78–100)
MONOCYTES # BLD AUTO: 0.6 10E9/L (ref 0–1.3)
MONOCYTES NFR BLD AUTO: 11.5 %
NEUTROPHILS # BLD AUTO: 2.1 10E9/L (ref 1.6–8.3)
NEUTROPHILS NFR BLD AUTO: 43.4 %
NRBC # BLD AUTO: 0 10*3/UL
NRBC BLD AUTO-RTO: 0 /100
PLATELET # BLD AUTO: 135 10E9/L (ref 150–450)
RBC # BLD AUTO: 5.74 10E12/L (ref 4.4–5.9)
WBC # BLD AUTO: 4.8 10E9/L (ref 4–11)

## 2020-10-14 ENCOUNTER — MYC MEDICAL ADVICE (OUTPATIENT)
Dept: ONCOLOGY | Facility: CLINIC | Age: 30
End: 2020-10-14

## 2020-10-14 NOTE — TELEPHONE ENCOUNTER
Pt had incorrect fax #. Confirmed which clinic he wanted and faxed to [phone]. Confirmed transmission. Pt is aware.

## 2020-10-29 ENCOUNTER — TELEPHONE (OUTPATIENT)
Dept: ONCOLOGY | Facility: CLINIC | Age: 30
End: 2020-10-29

## 2020-10-29 ENCOUNTER — PATIENT OUTREACH (OUTPATIENT)
Dept: ONCOLOGY | Facility: CLINIC | Age: 30
End: 2020-10-29

## 2020-10-29 ENCOUNTER — MYC MEDICAL ADVICE (OUTPATIENT)
Dept: ONCOLOGY | Facility: CLINIC | Age: 30
End: 2020-10-29

## 2020-10-29 ENCOUNTER — HOSPITAL ENCOUNTER (EMERGENCY)
Facility: CLINIC | Age: 30
Discharge: HOME | End: 2020-10-29
Attending: EMERGENCY MEDICINE | Admitting: EMERGENCY MEDICINE
Payer: COMMERCIAL

## 2020-10-29 VITALS
HEART RATE: 95 BPM | TEMPERATURE: 98.5 F | DIASTOLIC BLOOD PRESSURE: 97 MMHG | RESPIRATION RATE: 16 BRPM | HEIGHT: 72 IN | OXYGEN SATURATION: 93 % | BODY MASS INDEX: 28.88 KG/M2 | SYSTOLIC BLOOD PRESSURE: 152 MMHG | WEIGHT: 213.19 LBS

## 2020-10-29 DIAGNOSIS — D69.3 IDIOPATHIC THROMBOCYTOPENIC PURPURA (H): ICD-10-CM

## 2020-10-29 LAB
BASOPHILS # BLD AUTO: 0 10E9/L (ref 0–0.2)
BASOPHILS NFR BLD AUTO: 1 %
DIFFERENTIAL METHOD BLD: ABNORMAL
EOSINOPHIL # BLD AUTO: 0.2 10E9/L (ref 0–0.7)
EOSINOPHIL NFR BLD AUTO: 4.4 %
ERYTHROCYTE [DISTWIDTH] IN BLOOD BY AUTOMATED COUNT: 12.4 % (ref 10–15)
HCT VFR BLD AUTO: 48.5 % (ref 40–53)
HGB BLD-MCNC: 16.5 G/DL (ref 13.3–17.7)
IMM GRANULOCYTES # BLD: 0 10E9/L (ref 0–0.4)
IMM GRANULOCYTES NFR BLD: 0.3 %
LYMPHOCYTES # BLD AUTO: 1.6 10E9/L (ref 0.8–5.3)
LYMPHOCYTES NFR BLD AUTO: 40.6 %
MCH RBC QN AUTO: 29.1 PG (ref 26.5–33)
MCHC RBC AUTO-ENTMCNC: 34 G/DL (ref 31.5–36.5)
MCV RBC AUTO: 86 FL (ref 78–100)
MONOCYTES # BLD AUTO: 0.4 10E9/L (ref 0–1.3)
MONOCYTES NFR BLD AUTO: 9.9 %
NEUTROPHILS # BLD AUTO: 1.7 10E9/L (ref 1.6–8.3)
NEUTROPHILS NFR BLD AUTO: 43.8 %
NRBC # BLD AUTO: 0 10*3/UL
NRBC BLD AUTO-RTO: 0 /100
PLATELET # BLD AUTO: 1 10E9/L (ref 150–450)
RBC # BLD AUTO: 5.67 10E12/L (ref 4.4–5.9)
WBC # BLD AUTO: 3.8 10E9/L (ref 4–11)

## 2020-10-29 PROCEDURE — 99284 EMERGENCY DEPT VISIT MOD MDM: CPT | Mod: 25 | Performed by: EMERGENCY MEDICINE

## 2020-10-29 PROCEDURE — 85025 COMPLETE CBC W/AUTO DIFF WBC: CPT | Performed by: INTERNAL MEDICINE

## 2020-10-29 PROCEDURE — 96366 THER/PROPH/DIAG IV INF ADDON: CPT | Performed by: EMERGENCY MEDICINE

## 2020-10-29 PROCEDURE — 96365 THER/PROPH/DIAG IV INF INIT: CPT | Performed by: EMERGENCY MEDICINE

## 2020-10-29 PROCEDURE — 250N000011 HC RX IP 250 OP 636: Performed by: EMERGENCY MEDICINE

## 2020-10-29 PROCEDURE — 96375 TX/PRO/DX INJ NEW DRUG ADDON: CPT | Performed by: EMERGENCY MEDICINE

## 2020-10-29 PROCEDURE — 99284 EMERGENCY DEPT VISIT MOD MDM: CPT | Performed by: EMERGENCY MEDICINE

## 2020-10-29 RX ORDER — HEPARIN SODIUM,PORCINE 10 UNIT/ML
5 VIAL (ML) INTRAVENOUS
Status: CANCELLED | OUTPATIENT
Start: 2020-10-29

## 2020-10-29 RX ORDER — METHYLPREDNISOLONE SODIUM SUCCINATE 125 MG/2ML
125 INJECTION, POWDER, LYOPHILIZED, FOR SOLUTION INTRAMUSCULAR; INTRAVENOUS ONCE
Status: COMPLETED | OUTPATIENT
Start: 2020-10-29 | End: 2020-10-29

## 2020-10-29 RX ORDER — ACETAMINOPHEN 325 MG/1
650 TABLET ORAL ONCE
Status: CANCELLED
Start: 2020-10-29

## 2020-10-29 RX ORDER — HEPARIN SODIUM (PORCINE) LOCK FLUSH IV SOLN 100 UNIT/ML 100 UNIT/ML
5 SOLUTION INTRAVENOUS
Status: CANCELLED | OUTPATIENT
Start: 2020-10-29

## 2020-10-29 RX ORDER — DIPHENHYDRAMINE HCL 25 MG
50 CAPSULE ORAL ONCE
Status: CANCELLED | OUTPATIENT
Start: 2020-10-29

## 2020-10-29 RX ADMIN — METHYLPREDNISOLONE SODIUM SUCCINATE 125 MG: 125 INJECTION, POWDER, FOR SOLUTION INTRAMUSCULAR; INTRAVENOUS at 17:06

## 2020-10-29 RX ADMIN — HUMAN IMMUNOGLOBULIN G 75 G: 40 LIQUID INTRAVENOUS at 18:20

## 2020-10-29 ASSESSMENT — MIFFLIN-ST. JEOR: SCORE: 1955

## 2020-10-29 NOTE — TELEPHONE ENCOUNTER
Roberto called this am reporting petechiae all over hands and legs. Platelet count 1k. Because we did not have insurance approval for IvIg, was sent to ED for evaluation and treatment with IvIg. Unfortunately the ED is extremely busy and he has been waiting several hours. Nursing reports he is about to be seen by physician. The safest option if for him to be assess by the ED physician, and if no concerns for eg infection or major bleeding, then receive IV solumedrol 125 mg and IvIg 1 g/kg and then can go home, and f/up with labs tomorrow and we will find a way to get him another dose of IvIg. He prefers this to simply going home on high dose dexamethasone. He already has a prescription sent in for him to continue prednisone 40 mg daily.   I can be paged with questions.    Madelin Wright MD  Hematology  Pager [phone]

## 2020-10-29 NOTE — ED PROVIDER NOTES
[place] EMERGENCY DEPARTMENT ([facility])  10/29/20    History   No chief complaint on file.    GILES Spence is a 30 year old male with past medical history of celiac disease, ITP, chronic leukopenia of unclear etiology, depression and anxiety who presents from [facility] for evaluation due to low platelet count.  Patient states they noticed some petechial dots on his hands and legs.  He had his platelets drawn today which showed 1000.  Spoke to his hematologist and unfortunately they are unable to get him into the infusion clinic so they had him come to the emergency room for evaluation and treatment.  Patient denies any complaints of headache, bleeding or all other complaints at this time.    Note from Hematology:  Roberto called this am reporting petechiae all over hands and legs. Platelet count 1k. Because we did not have insurance approval for IvIg, was sent to ED for evaluation and treatment with IvIg. Unfortunately the ED is extremely busy and he has been waiting several hours. Nursing reports he is about to be seen by physician. The safest option if for him to be assess by the ED physician, and if no concerns for eg infection or major bleeding, then receive IV solumedrol 125 mg and IvIg 1 g/kg and then can go home, and f/up with labs tomorrow and we will find a way to get him another dose of IvIg. He prefers this to simply going home on high dose dexamethasone. He already has a prescription sent in for him to continue prednisone 40 mg daily.   I can be paged with questions.     Madelin Wright MD  Hematology  Pager [phone]  Past Medical History  Past Medical History:   Diagnosis Date     ADHD      Celiac disease 06/10/2015     Depression      Iron deficiency      Platelets decreased (H) 06/10/2015    lower than average per patient report     Past Surgical History:   Procedure Laterality Date     NO HISTORY OF SURGERY            amphetamine-dextroamphetamine (ADDERALL) 30 MG per  tablet       buPROPion (WELLBUTRIN XL) 300 MG 24 hr tablet       escitalopram (LEXAPRO) 10 MG tablet       famotidine (PEPCID) 20 MG tablet       omeprazole (PRILOSEC) 20 MG CR capsule       predniSONE (DELTASONE) 20 MG tablet      Allergies   Allergen Reactions     Gluten Meal Unknown     Nsaids Unknown     Has low platelets     Family History  Family History   Problem Relation Age of Onset     Coronary Artery Disease Father      Thyroid Disease Father      Known Genetic Syndrome Brother         autisism     Chemical Addiction Paternal Grandfather      Family History Negative Mother      Diabetes No family hx of      Hypertension No family hx of      Hyperlipidemia No family hx of      Cerebrovascular Disease No family hx of      Breast Cancer No family hx of      Colon Cancer No family hx of      Prostate Cancer No family hx of      Other Cancer No family hx of      Depression No family hx of      Anxiety Disorder No family hx of      Mental Illness No family hx of      Substance Abuse No family hx of      Anesthesia Reaction No family hx of      Asthma No family hx of      Osteoporosis No family hx of      Genetic Disorder No family hx of      Obesity No family hx of      Unknown/Adopted No family hx of      Social History   Social History     Tobacco Use     Smoking status: Never Smoker     Smokeless tobacco: Never Used   Substance Use Topics     Alcohol use: Yes     Alcohol/week: 0.0 standard drinks     Drug use: No      Past medical history, past surgical history, medications, allergies, family history, and social history were reviewed with the patient. No additional pertinent items.       Review of Systems  A complete review of systems was performed with pertinent positives and negatives noted in the HPI, and all other systems negative.    Physical Exam      Physical Exam  Vitals signs and nursing note reviewed.   Constitutional:       General: He is not in acute distress.     Appearance: He is  well-developed. He is not ill-appearing, toxic-appearing or diaphoretic.      Comments: Comfortably resting, lying in bed, NAD, nondiaphoretic, lucid, fully conversant, no  respiratory distress, alert and oriented.     HENT:      Head: Normocephalic and atraumatic.      Mouth/Throat:      Mouth: Mucous membranes are moist.   Eyes:      General: No scleral icterus.     Extraocular Movements: Extraocular movements intact.      Pupils: Pupils are equal, round, and reactive to light.   Neck:      Musculoskeletal: Normal range of motion and neck supple.   Cardiovascular:      Rate and Rhythm: Normal rate.   Pulmonary:      Effort: Pulmonary effort is normal.   Abdominal:      Palpations: Abdomen is soft.      Tenderness: There is no abdominal tenderness.   Skin:     General: Skin is warm and dry.      Capillary Refill: Capillary refill takes less than 2 seconds.      Findings: No rash.      Comments: Mild petechial    Neurological:      Mental Status: He is alert and oriented to person, place, and time.           ED Course      Procedures                         Results for orders placed or performed in visit on 10/29/20   CBC with platelets differential     Status: Abnormal   Result Value Ref Range    WBC 3.8 (L) 4.0 - 11.0 10e9/L    RBC Count 5.67 4.4 - 5.9 10e12/L    Hemoglobin 16.5 13.3 - 17.7 g/dL    Hematocrit 48.5 40.0 - 53.0 %    MCV 86 78 - 100 fl    MCH 29.1 26.5 - 33.0 pg    MCHC 34.0 31.5 - 36.5 g/dL    RDW 12.4 10.0 - 15.0 %    Platelet Count 1 (LL) 150 - 450 10e9/L    Diff Method Automated Method     % Neutrophils 43.8 %    % Lymphocytes 40.6 %    % Monocytes 9.9 %    % Eosinophils 4.4 %    % Basophils 1.0 %    % Immature Granulocytes 0.3 %    Nucleated RBCs 0 0 /100    Absolute Neutrophil 1.7 1.6 - 8.3 10e9/L    Absolute Lymphocytes 1.6 0.8 - 5.3 10e9/L    Absolute Monocytes 0.4 0.0 - 1.3 10e9/L    Absolute Eosinophils 0.2 0.0 - 0.7 10e9/L    Absolute Basophils 0.0 0.0 - 0.2 10e9/L    Abs Immature  Granulocytes 0.0 0 - 0.4 10e9/L    Absolute Nucleated RBC 0.0      Medications - No data to display     Assessments & Plan (with Medical Decision Making)   This is a 30-year-old male with a history of ITP who is presenting to the emergency room with mild petechial skin lesions with platelet level of 1.  He did speak to hematology today who recommended he get steroids and IVIG.  I did place their note within the H&P today.  Here he is otherwise hemodynamically stable.  He has no active bleeding.  He has very mild petechial lesions on his hands.  He was provided with steroids as directed by hematology as well as an infusion of IVIG and tolerated it well.  At this time their plan is for him to continue taking steroids outpatient and he will follow-up with their clinic tomorrow.  Unfortunately there was no beds available in the hospital for an admission but the patient felt comfortable with the plan of being discharged.    Pt was discharged home/self-care.  PT was provided written discharge instructions. Additionally verbal instructions were given and discussed with patient.  PT was asked to return to the ED immediately for any new or concerning symptoms.  Pt was in agreement, endorsed understanding, and questions were answered.       I have reviewed the nursing notes. I have reviewed the findings, diagnosis, plan and need for follow up with the patient.    New Prescriptions    No medications on file       Final diagnoses:   None       --  Bernardo Braden MD  [facility] EMERGENCY DEPARTMENT  10/29/2020     Anjum Braden MD  10/29/20 [id number]

## 2020-10-29 NOTE — TELEPHONE ENCOUNTER
Pt called in to triage reporting he woke up this morning with petechiae on the tops of both feet, a new bruise on his chest not due to trauma and blood when he blows his nose. Wondering if he can check his labs at local [facility], state he gets IVIG when platelets are low. Stated last infusion was earlier this year, though not found in Epic.     Standing CBC p/d lab order faxed to [facility] at [phone]. Pt will send pictures of bruise and petechiae to [facility]. Paged Dr. Wright.

## 2020-10-29 NOTE — Clinical Note
10/29/2020         RE: Roberto Spence  [address] [address]        Dear Colleague,    Thank you for referring your patient, Roberto Spence, to the [facility]. Please see a copy of my visit note below.    Chief Complaint   Patient presents with     Blood Draw     VPT blood draw only from right arm           Again, thank you for allowing me to participate in the care of your patient.        Sincerely,        [facility] Lab Draw

## 2020-10-29 NOTE — ED AVS SNAPSHOT
[facility] Emergency Department  [address] [address]  Phone: [phone]                                    Roberto Spence   MRN: [id number]    Department: [facility] Emergency Department   Date of Visit: 10/29/2020           After Visit Summary Signature Page    I have received my discharge instructions, and my questions have been answered. I have discussed any challenges I see with this plan with the nurse or doctor.    ..........................................................................................................................................  Patient/Patient Representative Signature      ..........................................................................................................................................  Patient Representative Print Name and Relationship to Patient    ..................................................               ................................................  Date                                   Time    ..........................................................................................................................................  Reviewed by Signature/Title    ...................................................              ..............................................  Date                                               Time          22EPIC Rev 08/18

## 2020-10-29 NOTE — TELEPHONE ENCOUNTER
Patient calls triage at 0912 and states [facility] is booked and unable to accommodate him for a lab appointment. He will come to [facility] for labs this morning at 10:00 am. High priority message sent to scheduling. Message sent to infusion nurses to inquire about IVIG infusion today for platelets less than 20 per MD orders.

## 2020-10-29 NOTE — PROGRESS NOTES
Writer placed call to Roberto to discuss his platelet value of 1 and needing IVIG today. Per infusion charge, no insurance approval as plan entered just today, advised to go to ER for urgent treatment.

## 2020-10-29 NOTE — TELEPHONE ENCOUNTER
Per Dr. Wright: will order IVIG standing orders, will not give unless Plt <20. Monitor care everywhere for results.

## 2020-10-29 NOTE — TELEPHONE ENCOUNTER
Spoke with patient, he will present to [facility] ED now for platelets of 1.

## 2020-10-30 ENCOUNTER — PATIENT OUTREACH (OUTPATIENT)
Dept: ONCOLOGY | Facility: CLINIC | Age: 30
End: 2020-10-30

## 2020-10-30 ENCOUNTER — HOSPITAL ENCOUNTER (OUTPATIENT)
Facility: CLINIC | Age: 30
Setting detail: SPECIMEN
Discharge: HOME | End: 2020-10-30
Attending: INTERNAL MEDICINE | Admitting: INTERNAL MEDICINE
Payer: COMMERCIAL

## 2020-10-30 ENCOUNTER — INFUSION THERAPY VISIT (OUTPATIENT)
Dept: INFUSION THERAPY | Facility: CLINIC | Age: 30
End: 2020-10-30
Attending: INTERNAL MEDICINE
Payer: COMMERCIAL

## 2020-10-30 VITALS
OXYGEN SATURATION: 94 % | SYSTOLIC BLOOD PRESSURE: 148 MMHG | RESPIRATION RATE: 16 BRPM | TEMPERATURE: 98 F | DIASTOLIC BLOOD PRESSURE: 98 MMHG | HEART RATE: 95 BPM

## 2020-10-30 DIAGNOSIS — D69.3 IDIOPATHIC THROMBOCYTOPENIC PURPURA (H): Primary | ICD-10-CM

## 2020-10-30 DIAGNOSIS — D69.6 THROMBOCYTOPENIA (H): ICD-10-CM

## 2020-10-30 LAB
BASOPHILS # BLD AUTO: 0 10E9/L (ref 0–0.2)
BASOPHILS NFR BLD AUTO: 0.1 %
DIFFERENTIAL METHOD BLD: ABNORMAL
EOSINOPHIL # BLD AUTO: 0 10E9/L (ref 0–0.7)
EOSINOPHIL NFR BLD AUTO: 0 %
ERYTHROCYTE [DISTWIDTH] IN BLOOD BY AUTOMATED COUNT: 12.2 % (ref 10–15)
HCT VFR BLD AUTO: 48.5 % (ref 40–53)
HGB BLD-MCNC: 16.1 G/DL (ref 13.3–17.7)
IMM GRANULOCYTES # BLD: 0 10E9/L (ref 0–0.4)
IMM GRANULOCYTES NFR BLD: 0.3 %
LYMPHOCYTES # BLD AUTO: 0.7 10E9/L (ref 0.8–5.3)
LYMPHOCYTES NFR BLD AUTO: 10.1 %
MCH RBC QN AUTO: 28.8 PG (ref 26.5–33)
MCHC RBC AUTO-ENTMCNC: 33.2 G/DL (ref 31.5–36.5)
MCV RBC AUTO: 87 FL (ref 78–100)
MONOCYTES # BLD AUTO: 0.6 10E9/L (ref 0–1.3)
MONOCYTES NFR BLD AUTO: 8.8 %
NEUTROPHILS # BLD AUTO: 5.5 10E9/L (ref 1.6–8.3)
NEUTROPHILS NFR BLD AUTO: 80.7 %
NRBC # BLD AUTO: 0 10*3/UL
NRBC BLD AUTO-RTO: 0 /100
PLATELET # BLD AUTO: 18 10E9/L (ref 150–450)
RBC # BLD AUTO: 5.6 10E12/L (ref 4.4–5.9)
WBC # BLD AUTO: 6.8 10E9/L (ref 4–11)

## 2020-10-30 PROCEDURE — 99207 PR NO CHARGE LOS: CPT

## 2020-10-30 PROCEDURE — 250N000011 HC RX IP 250 OP 636: Performed by: INTERNAL MEDICINE

## 2020-10-30 PROCEDURE — 85025 COMPLETE CBC W/AUTO DIFF WBC: CPT | Performed by: INTERNAL MEDICINE

## 2020-10-30 PROCEDURE — 96366 THER/PROPH/DIAG IV INF ADDON: CPT

## 2020-10-30 PROCEDURE — 250N000013 HC RX MED GY IP 250 OP 250 PS 637: Performed by: INTERNAL MEDICINE

## 2020-10-30 PROCEDURE — 96375 TX/PRO/DX INJ NEW DRUG ADDON: CPT

## 2020-10-30 PROCEDURE — 96365 THER/PROPH/DIAG IV INF INIT: CPT

## 2020-10-30 RX ORDER — DIPHENHYDRAMINE HCL 25 MG
50 CAPSULE ORAL ONCE
Status: CANCELLED | OUTPATIENT
Start: 2020-10-30

## 2020-10-30 RX ORDER — DIPHENHYDRAMINE HCL 25 MG
50 CAPSULE ORAL ONCE
Status: COMPLETED | OUTPATIENT
Start: 2020-10-30 | End: 2020-10-30

## 2020-10-30 RX ORDER — HEPARIN SODIUM (PORCINE) LOCK FLUSH IV SOLN 100 UNIT/ML 100 UNIT/ML
5 SOLUTION INTRAVENOUS
Status: CANCELLED | OUTPATIENT
Start: 2020-10-30

## 2020-10-30 RX ORDER — ACETAMINOPHEN 325 MG/1
650 TABLET ORAL ONCE
Status: CANCELLED
Start: 2020-10-30

## 2020-10-30 RX ORDER — HEPARIN SODIUM,PORCINE 10 UNIT/ML
5 VIAL (ML) INTRAVENOUS
Status: CANCELLED | OUTPATIENT
Start: 2020-10-30

## 2020-10-30 RX ORDER — MULTIPLE VITAMINS W/ MINERALS TAB 9MG-400MCG
1 TAB ORAL DAILY
COMMUNITY
End: 2023-09-18

## 2020-10-30 RX ADMIN — DIPHENHYDRAMINE HYDROCHLORIDE 50 MG: 25 CAPSULE ORAL at 10:22

## 2020-10-30 RX ADMIN — HYDROCORTISONE SODIUM SUCCINATE 100 MG: 100 INJECTION, POWDER, FOR SOLUTION INTRAMUSCULAR; INTRAVENOUS at 10:22

## 2020-10-30 RX ADMIN — HUMAN IMMUNOGLOBULIN G 80 G: 40 LIQUID INTRAVENOUS at 10:40

## 2020-10-30 ASSESSMENT — PAIN SCALES - GENERAL: PAINLEVEL: NO PAIN (0)

## 2020-10-30 NOTE — PROGRESS NOTES
placed call to Roberto to review that we had secured an appointment for second dosing of IVIG at [place] at 1000 am this morning. Roberto had read a [employer] message sent just prior to calling and was getting ready to leave.  followed up with Dr Wright by phone call and plan is for labs to be drawn on Monday, Wednesday, Friday with a follow up visit on 11/11/2020 at 0900, Dr Wright will add on to end of her clinic that day as no other openings are currently available until December. Message sent to Roberto advising of this plan and message sent to  pool to assist in scheduling.

## 2020-10-30 NOTE — PROGRESS NOTES
Infusion Nursing Note:  Roberto Spence presents today for IVIG and premeds.    Patient seen by provider today: No   present during visit today: Not Applicable.    Note: Patient here for second dose of IVIG. Received first infusion in ED yesterday for platelets 1. Patient has petechaie on arms and legs, visible bruising noted from where blood pressure cuff had been used yesterday. Patient reports small amount of blood when he blows his nose, but no other signs of active bleeding. Patient took his own tylenol. Patient started taking 40 mg Prednisone PO and is due to have labs drawn [facility] with follow up to be scheduled 11/11 with Dr. Wright. Labs done today and platelets increased to 18.    IVIG started at 0.5mg/kg/hr and increased by 0.5 mg/kg/hr for max of 4 mg/kg/hr.    Intravenous Access:  Peripheral IV placed.    Treatment Conditions:  Lab Results   Component Value Date    HGB 16.1 10/30/2020     Lab Results   Component Value Date    WBC 6.8 10/30/2020      Lab Results   Component Value Date    ANEU 5.5 10/30/2020     Lab Results   Component Value Date    PLT 18 10/30/2020          Post Infusion Assessment:  Patient tolerated infusion without incident.  Blood return noted pre and post infusion.  Site patent and intact, free from redness, edema or discomfort.  No evidence of extravasations.  Access discontinued per protocol.       Discharge Plan:   Patient will return to his local clinic for next appointment for labs on Monday.  Patient discharged in stable condition accompanied by: self.  Departure Mode: Ambulatory.    Lia Crowell RN

## 2020-10-30 NOTE — DISCHARGE INSTRUCTIONS
Please make an appointment to follow up with Hematology Oncology Clinic (phone: [phone]) as soon as possible.

## 2020-11-02 DIAGNOSIS — D69.3 IDIOPATHIC THROMBOCYTOPENIC PURPURA (H): ICD-10-CM

## 2020-11-02 LAB
BASOPHILS # BLD AUTO: 0 10E9/L (ref 0–0.2)
BASOPHILS NFR BLD AUTO: 0.4 %
DIFFERENTIAL METHOD BLD: ABNORMAL
EOSINOPHIL # BLD AUTO: 0 10E9/L (ref 0–0.7)
EOSINOPHIL NFR BLD AUTO: 0.8 %
ERYTHROCYTE [DISTWIDTH] IN BLOOD BY AUTOMATED COUNT: 12.5 % (ref 10–15)
HCT VFR BLD AUTO: 47.9 % (ref 40–53)
HGB BLD-MCNC: 16.4 G/DL (ref 13.3–17.7)
IMM GRANULOCYTES # BLD: 0 10E9/L (ref 0–0.4)
IMM GRANULOCYTES NFR BLD: 0.4 %
LYMPHOCYTES # BLD AUTO: 0.8 10E9/L (ref 0.8–5.3)
LYMPHOCYTES NFR BLD AUTO: 31.7 %
MCH RBC QN AUTO: 28.9 PG (ref 26.5–33)
MCHC RBC AUTO-ENTMCNC: 34.2 G/DL (ref 31.5–36.5)
MCV RBC AUTO: 84 FL (ref 78–100)
MONOCYTES # BLD AUTO: 0.2 10E9/L (ref 0–1.3)
MONOCYTES NFR BLD AUTO: 6 %
NEUTROPHILS # BLD AUTO: 1.5 10E9/L (ref 1.6–8.3)
NEUTROPHILS NFR BLD AUTO: 60.7 %
NRBC # BLD AUTO: 0 10*3/UL
NRBC BLD AUTO-RTO: 0 /100
PLATELET # BLD AUTO: 118 10E9/L (ref 150–450)
RBC # BLD AUTO: 5.68 10E12/L (ref 4.4–5.9)
WBC # BLD AUTO: 2.5 10E9/L (ref 4–11)

## 2020-11-02 PROCEDURE — 85025 COMPLETE CBC W/AUTO DIFF WBC: CPT | Performed by: INTERNAL MEDICINE

## 2020-11-02 NOTE — Clinical Note
11/2/2020         RE: Roberto Spence  [address] [address]        Dear Colleague,    Thank you for referring your patient, Roberto Spence, to the [facility] CANCER CLINIC. Please see a copy of my visit note below.    Chief Complaint   Patient presents with     Blood Draw     VPT blood draw only in left arm            Again, thank you for allowing me to participate in the care of your patient.        Sincerely,        [facility] Lab Draw

## 2020-11-04 ENCOUNTER — MYC MEDICAL ADVICE (OUTPATIENT)
Dept: ONCOLOGY | Facility: CLINIC | Age: 30
End: 2020-11-04

## 2020-11-04 DIAGNOSIS — D69.3 IDIOPATHIC THROMBOCYTOPENIC PURPURA (H): ICD-10-CM

## 2020-11-04 RX ORDER — PREDNISONE 20 MG/1
40 TABLET ORAL DAILY
Qty: 120 TABLET | Refills: 1 | Status: SHIPPED | OUTPATIENT
Start: 2020-11-04 | End: 2020-12-09

## 2020-11-06 ENCOUNTER — PATIENT OUTREACH (OUTPATIENT)
Dept: ONCOLOGY | Facility: CLINIC | Age: 30
End: 2020-11-06

## 2020-11-06 ENCOUNTER — MYC MEDICAL ADVICE (OUTPATIENT)
Dept: ONCOLOGY | Facility: CLINIC | Age: 30
End: 2020-11-06

## 2020-11-11 ENCOUNTER — VIRTUAL VISIT (OUTPATIENT)
Dept: ONCOLOGY | Facility: CLINIC | Age: 30
End: 2020-11-11
Attending: INTERNAL MEDICINE
Payer: COMMERCIAL

## 2020-11-11 DIAGNOSIS — D69.3 IDIOPATHIC THROMBOCYTOPENIC PURPURA (H): Primary | ICD-10-CM

## 2020-11-11 PROCEDURE — 99214 OFFICE O/P EST MOD 30 MIN: CPT | Mod: 95 | Performed by: INTERNAL MEDICINE

## 2020-11-11 RX ORDER — DIPHENHYDRAMINE HCL 25 MG
50 CAPSULE ORAL ONCE
Status: CANCELLED
Start: 2020-12-04

## 2020-11-11 RX ORDER — ACETAMINOPHEN 325 MG/1
650 TABLET ORAL ONCE
Status: CANCELLED
Start: 2020-12-04

## 2020-11-11 NOTE — PATIENT INSTRUCTIONS
-immunizations within the next week, prior to rituximab- influenza, ActHIB, Prevnar 13, Menactra.   - After 12 months needs pneumovax 23  -Decrease prednisone to 20 mg daily  -Rituximab weekly x 4 starting ~ 12/4 (Because of [place], the last dose may be delayed for 3-7 days)  -Weekly labs- nearby [facility] lab, avoid Fridays-  contact us to remind us to look at the results. On Rituximab days, have labs at [facility].   RTC 12/9 video

## 2020-11-11 NOTE — PROGRESS NOTES
"Roberto Spence is a 30 year old male who is being evaluated via a billable video visit.      The patient has been notified of following:     \"This video visit will be conducted via a call between you and your physician/provider. We have found that certain health care needs can be provided without the need for an in-person physical exam.  This service lets us provide the care you need with a video conversation.  If a prescription is necessary we can send it directly to your pharmacy.  If lab work is needed we can place an order for that and you can then stop by our lab to have the test done at a later time.    Video visits are billed at different rates depending on your insurance coverage.  Please reach out to your insurance provider with any questions.    If during the course of the call the physician/provider feels a video visit is not appropriate, you will not be charged for this service.\"    Patient has given verbal consent for Video visit? Yes  How would you like to obtain your AVS? MyChart  If you are dropped from the video visit, the video invite should be resent to: Send to e-mail at: [email or web address]  Will anyone else be joining your video visit? No      Vitals - Patient Reported  Weight (Patient Reported): 96.6 kg (213 lb)  Pain Score: No Pain (0)        I have reviewed and updated patient's allergy and medication list.    Concerns: None  Refills: None      Leni Santiago [facility]    LIST:  1) ITP- presented with platelet count of 1k on 10/20/17. Responsive to prednisone and IvIg. In remission for >2 years, relapsed 5/16/20, and again 10/29/20.  2) leukopenia, thrombocytopenia since at least 2012- Bone marrow bx on 12/5/12 show hypocellular marrow 20-40%, no dysplasia, normal cytogenetics, normal flow cytometry.   2) h/o mild iron deficiency  3) anxiety  4) Celiac disease, diagnosed June 2015      Interim History: Mr. Spence is seen by video visit for f/u of ITP. He presented with relapse on 10/29/20, " with petechiae and platelet count of 1k.  Unfortunately we could not get preauthorization fast enough ro outpatient IvIg, so had to go to ED, long wait. Received IvIg 1 g/kg on 10/29 and solumedrol 125 mg. The outpatient IvIg 1 mg on 10/30/20.  Platelets oscar to 18k on 10/30, then 118 k on 11/2. He is on prednisone 40 mg daily. He has not had any more petechiae. He is feeling well. No fever, chills sweats. Does not recall any viral illness sx when he had relapse of ITP.  Says he is quite strict about following gluten free diet. Does not think he had any gluten around the time of ITP relapse.     He has had a lot of stress at work- too much to do. But schedule is a bit flexible, works form home.     Physical exam:    General appearance:  Patient is 30 year old man in no acute distress.     HEENT:  No pallor, icterus.   Lungs: Normal respirations, no cough or audible wheezes.   Skin:  No rash, no petechiae or ecchymoses.    The rest of a comprehensive physical examination is deferred due to public [facility] emergency video visit restrictions.    Assessment and Recommendation:  1. ITP- repeated relapse. He did not tolerate the dexamethasone very well, so on prednisone 40 mg po daily. Giving IvIg for short term management has become problematic, because there is not always infusion room time, and insurance has become very restrictive, so cannot obtain it quickly in the outpatient setting. . I discussed with him that the next step I recommend is rituximab. There is a 6/10 chance that rituximab will keep in in remission months to years. Main risks are infusion reaction, very rare instances of PML, but he has not been on long term heavy immunosupression, so that is unlikely. Does not have h/o HepB, HIV or Hep C, with negative serologies. He is open to the idea of splenectomy if the rituximab doesn't work. Also discussed Promacta or N-plate, but would try rituximab first, followed by splenectomy if needed.     Discussed that  ITP has turning into a chronic disease for him. Hard to predict relapse. Goal is to prevent severe relapses and allow him to live a normal life. He is concerned that he will die of complications of ITP. I discussed that our goal is to prevent that from happening, and very rare that someone dies from ITP that cannot be controlled. Tried to reassure him that we will be able to manage this one way or another.       -immunizations within the next week, prior to rituximab- influenza, ActHIB, Prevnar 13, Menactra.   - After 12 months needs pneumovax 23  -Decrease prednisone to 20 mg daily  -Rituximab weekly x 4 starting ~ 12/4  -Weekly labs- he is getting them at his nearby [facility] lab, avoid Fridays- he should contact us to remind us to look at the results.   C 12/9 video    Madelin Wright MD  Hematology    Video-Visit Details    Type of service:  Video Visit    Video Start Time: 8:58 am  Video End Time: 9:27 am    Originating Location (pt. Location): Home    Distant Location (provider location):  [facility] CANCER [facility]     Platform used for Video Visit: Sergei Wright MD  Hematology

## 2020-11-11 NOTE — LETTER
"    11/11/2020         RE: Roberto Spence  [address]  [place] [address]        Dear Colleague,    Thank you for referring your patient, Roberto Spence, to the [facility]. Please see a copy of my visit note below.    Roberto Spence is a 30 year old male who is being evaluated via a billable video visit.      The patient has been notified of following:     \"This video visit will be conducted via a call between you and your physician/provider. We have found that certain health care needs can be provided without the need for an in-person physical exam.  This service lets us provide the care you need with a video conversation.  If a prescription is necessary we can send it directly to your pharmacy.  If lab work is needed we can place an order for that and you can then stop by our lab to have the test done at a later time.    Video visits are billed at different rates depending on your insurance coverage.  Please reach out to your insurance provider with any questions.    If during the course of the call the physician/provider feels a video visit is not appropriate, you will not be charged for this service.\"    Patient has given verbal consent for Video visit? Yes  How would you like to obtain your AVS? MyChart  If you are dropped from the video visit, the video invite should be resent to: Send to e-mail at: [email or web address]  Will anyone else be joining your video visit? No      Vitals - Patient Reported  Weight (Patient Reported): 96.6 kg (213 lb)  Pain Score: No Pain (0)        I have reviewed and updated patient's allergy and medication list.    Concerns: None  Refills: None      Leni Santiago [facility]    LIST:  1) ITP- presented with platelet count of 1k on 10/20/17. Responsive to prednisone and IvIg. In remission for >2 years, relapsed 5/16/20, and again 10/29/20.  2) leukopenia, thrombocytopenia since at least 2012- Bone marrow bx on 12/5/12 show hypocellular marrow " 20-40%, no dysplasia, normal cytogenetics, normal flow cytometry.   2) h/o mild iron deficiency  3) anxiety  4) Celiac disease, diagnosed June 2015      Interim History: Mr. Spence is seen by video visit for f/u of ITP. He presented with relapse on 10/29/20, with petechiae and platelet count of 1k.  Unfortunately we could not get preauthorization fast enough ro outpatient IvIg, so had to go to ED, long wait. Received IvIg 1 g/kg on 10/29 and solumedrol 125 mg. The outpatient IvIg 1 mg on 10/30/20.  Platelets oscar to 18k on 10/30, then 118 k on 11/2. He is on prednisone 40 mg daily. He has not had any more petechiae. He is feeling well. No fever, chills sweats. Does not recall any viral illness sx when he had relapse of ITP.  Says he is quite strict about following gluten free diet. Does not think he had any gluten around the time of ITP relapse.     He has had a lot of stress at work- too much to do. But schedule is a bit flexible, works form home.     Physical exam:    General appearance:  Patient is 30 year old man in no acute distress.     HEENT:  No pallor, icterus.   Lungs: Normal respirations, no cough or audible wheezes.   Skin:  No rash, no petechiae or ecchymoses.    The rest of a comprehensive physical examination is deferred due to public [facility] emergency video visit restrictions.    Assessment and Recommendation:  1. ITP- repeated relapse. He did not tolerate the dexamethasone very well, so on prednisone 40 mg po daily. Giving IvIg for short term management has become problematic, because there is not always infusion room time, and insurance has become very restrictive, so cannot obtain it quickly in the outpatient setting. . I discussed with him that the next step I recommend is rituximab. There is a 6/10 chance that rituximab will keep in in remission months to years. Main risks are infusion reaction, very rare instances of PML, but he has not been on long term heavy immunosupression, so that is  unlikely. Does not have h/o HepB, HIV or Hep C, with negative serologies. He is open to the idea of splenectomy if the rituximab doesn't work. Also discussed Promacta or N-plate, but would try rituximab first, followed by splenectomy if needed.     Discussed that ITP has turning into a chronic disease for him. Hard to predict relapse. Goal is to prevent severe relapses and allow him to live a normal life. He is concerned that he will die of complications of ITP. I discussed that our goal is to prevent that from happening, and very rare that someone dies from ITP that cannot be controlled. Tried to reassure him that we will be able to manage this one way or another.       -immunizations within the next week, prior to rituximab- influenza, ActHIB, Prevnar 13, Menactra.   - After 12 months needs pneumovax 23  -Decrease prednisone to 20 mg daily  -Rituximab weekly x 4 starting ~ 12/4  -Weekly labs- he is getting them at his nearby [facility] lab, avoid Fridays- he should contact us to remind us to look at the results.   [facility] 12/9 video    Madelin Wright MD  Hematology    Video-Visit Details    Type of service:  Video Visit    Video Start Time: 8:58 am  Video End Time: 9:27 am    Originating Location (pt. Location): Home    Distant Location (provider location):  [facility] CANCER [facility]     Platform used for Video Visit: Sergei Wright MD  Hematology

## 2020-11-18 ENCOUNTER — ONCOLOGY VISIT (OUTPATIENT)
Dept: ONCOLOGY | Facility: CLINIC | Age: 30
End: 2020-11-18
Attending: INTERNAL MEDICINE
Payer: COMMERCIAL

## 2020-11-18 VITALS
RESPIRATION RATE: 18 BRPM | TEMPERATURE: 98.3 F | HEART RATE: 76 BPM | OXYGEN SATURATION: 94 % | DIASTOLIC BLOOD PRESSURE: 93 MMHG | SYSTOLIC BLOOD PRESSURE: 158 MMHG | WEIGHT: 215 LBS | BODY MASS INDEX: 29.16 KG/M2

## 2020-11-18 DIAGNOSIS — D69.3 IDIOPATHIC THROMBOCYTOPENIC PURPURA (H): Primary | ICD-10-CM

## 2020-11-18 DIAGNOSIS — D69.6 THROMBOCYTOPENIA (H): ICD-10-CM

## 2020-11-18 DIAGNOSIS — D69.3 IDIOPATHIC THROMBOCYTOPENIC PURPURA (H): ICD-10-CM

## 2020-11-18 DIAGNOSIS — R76.8 LOW IMMUNOGLOBULIN LEVEL: ICD-10-CM

## 2020-11-18 LAB
BASOPHILS # BLD AUTO: 0 10E9/L (ref 0–0.2)
BASOPHILS NFR BLD AUTO: 0.9 %
DIFFERENTIAL METHOD BLD: NORMAL
EOSINOPHIL # BLD AUTO: 0.1 10E9/L (ref 0–0.7)
EOSINOPHIL NFR BLD AUTO: 2.2 %
ERYTHROCYTE [DISTWIDTH] IN BLOOD BY AUTOMATED COUNT: 12.5 % (ref 10–15)
HCT VFR BLD AUTO: 50.6 % (ref 40–53)
HGB BLD-MCNC: 16.8 G/DL (ref 13.3–17.7)
IMM GRANULOCYTES # BLD: 0 10E9/L (ref 0–0.4)
IMM GRANULOCYTES NFR BLD: 0.2 %
LYMPHOCYTES # BLD AUTO: 1.9 10E9/L (ref 0.8–5.3)
LYMPHOCYTES NFR BLD AUTO: 41.6 %
MCH RBC QN AUTO: 28.6 PG (ref 26.5–33)
MCHC RBC AUTO-ENTMCNC: 33.2 G/DL (ref 31.5–36.5)
MCV RBC AUTO: 86 FL (ref 78–100)
MONOCYTES # BLD AUTO: 0.5 10E9/L (ref 0–1.3)
MONOCYTES NFR BLD AUTO: 11.8 %
NEUTROPHILS # BLD AUTO: 1.9 10E9/L (ref 1.6–8.3)
NEUTROPHILS NFR BLD AUTO: 43.3 %
NRBC # BLD AUTO: 0 10*3/UL
NRBC BLD AUTO-RTO: 0 /100
PLATELET # BLD AUTO: 163 10E9/L (ref 150–450)
RBC # BLD AUTO: 5.87 10E12/L (ref 4.4–5.9)
WBC # BLD AUTO: 4.5 10E9/L (ref 4–11)

## 2020-11-18 PROCEDURE — 90734 MENACWYD/MENACWYCRM VACC IM: CPT | Performed by: INTERNAL MEDICINE

## 2020-11-18 PROCEDURE — 90648 HIB PRP-T VACCINE 4 DOSE IM: CPT | Performed by: INTERNAL MEDICINE

## 2020-11-18 PROCEDURE — 90472 IMMUNIZATION ADMIN EACH ADD: CPT | Performed by: INTERNAL MEDICINE

## 2020-11-18 PROCEDURE — 90670 PCV13 VACCINE IM: CPT | Performed by: INTERNAL MEDICINE

## 2020-11-18 PROCEDURE — G0008 ADMIN INFLUENZA VIRUS VAC: HCPCS | Performed by: INTERNAL MEDICINE

## 2020-11-18 PROCEDURE — G0009 ADMIN PNEUMOCOCCAL VACCINE: HCPCS | Performed by: INTERNAL MEDICINE

## 2020-11-18 PROCEDURE — 250N000011 HC RX IP 250 OP 636: Performed by: INTERNAL MEDICINE

## 2020-11-18 PROCEDURE — 250N000021 HC RX MED A9270 GY (STAT IND- M) 250: Performed by: INTERNAL MEDICINE

## 2020-11-18 PROCEDURE — 90686 IIV4 VACC NO PRSV 0.5 ML IM: CPT | Performed by: INTERNAL MEDICINE

## 2020-11-18 PROCEDURE — 36415 COLL VENOUS BLD VENIPUNCTURE: CPT | Performed by: PATHOLOGY

## 2020-11-18 PROCEDURE — 85025 COMPLETE CBC W/AUTO DIFF WBC: CPT | Performed by: PATHOLOGY

## 2020-11-18 PROCEDURE — 90471 IMMUNIZATION ADMIN: CPT | Performed by: INTERNAL MEDICINE

## 2020-11-18 RX ADMIN — PNEUMOCOCCAL 13-VALENT CONJUGATE VACCINE 0.5 ML: 2.2; 2.2; 2.2; 2.2; 2.2; 4.4; 2.2; 2.2; 2.2; 2.2; 2.2; 2.2; 2.2 INJECTION, SUSPENSION INTRAMUSCULAR at 07:55

## 2020-11-18 RX ADMIN — INFLUENZA A VIRUS A/GUANGDONG-MAONAN/SWL1536/2019 CNIC-1909 (H1N1) ANTIGEN (FORMALDEHYDE INACTIVATED), INFLUENZA A VIRUS A/HONG KONG/2671/2019 (H3N2) ANTIGEN (FORMALDEHYDE INACTIVATED), INFLUENZA B VIRUS B/PHUKET/3073/2013 ANTIGEN (FORMALDEHYDE INACTIVATED), AND INFLUENZA B VIRUS B/WASHINGTON/02/2019 ANTIGEN (FORMALDEHYDE INACTIVATED) 0.5 ML: 15; 15; 15; 15 INJECTION, SUSPENSION INTRAMUSCULAR at 07:57

## 2020-11-18 RX ADMIN — HAEMOPHILUS B POLYSACCHARIDE CONJUGATE VACCINE FOR INJ 0.5 ML: RECON SOLN at 07:56

## 2020-11-18 RX ADMIN — NEISSERIA MENINGITIDIS GROUP A CAPSULAR POLYSACCHARIDE DIPHTHERIA TOXOID CONJUGATE ANTIGEN, NEISSERIA MENINGITIDIS GROUP C CAPSULAR POLYSACCHARIDE DIPHTHERIA TOXOID CONJUGATE ANTIGEN, NEISSERIA MENINGITIDIS GROUP Y CAPSULAR POLYSACCHARIDE DIPHTHERIA TOXOID CONJUGATE ANTIGEN, AND NEISSERIA MENINGITIDIS GROUP W-135 CAPSULAR POLYSACCHARIDE DIPHTHERIA TOXOID CONJUGATE ANTIGEN 0.5 ML: 4; 4; 4; 4 INJECTION, SOLUTION INTRAMUSCULAR at 07:52

## 2020-11-18 ASSESSMENT — PAIN SCALES - GENERAL: PAINLEVEL: NO PAIN (0)

## 2020-11-18 NOTE — LETTER
11/18/2020         RE: Roberto Spence  [address] [place] [address]        Dear Colleague,    Thank you for referring your patient, Roberto Spence, to the [facility] CLINIC. Please see a copy of my visit note below.    Chief Complaint   Patient presents with     Imm/Inj     Patient with  Idiopathic thrombocytopenic purpura - here for vitals and vaccines     Patient arrived to clinic for vaccines today and has no specific complaints and has been feeling well.  Patient declined to speak with an RN today.   No results needed for treatment today.  Flu vaccine and haemophilus vaccine given to Left Deltoid without incident and patient tolerated procedure well. Pneumococcal and meningococcal vaccines given to right deltoid without incident and patient tolerated procedure well.  Patient is aware of future appointments and uses MyChart.          Again, thank you for allowing me to participate in the care of your patient.        Sincerely,        [facility]

## 2020-11-18 NOTE — PROGRESS NOTES
Chief Complaint   Patient presents with     Imm/Inj     Patient with  Idiopathic thrombocytopenic purpura - here for vitals and vaccines     Patient arrived to clinic for vaccines today and has no specific complaints and has been feeling well.  Patient declined to speak with an RN today.   No results needed for treatment today.  Flu vaccine and haemophilus vaccine given to Left Deltoid without incident and patient tolerated procedure well. Pneumococcal and meningococcal vaccines given to right deltoid without incident and patient tolerated procedure well.  Patient is aware of future appointments and uses [facility].

## 2020-11-22 ENCOUNTER — MYC MEDICAL ADVICE (OUTPATIENT)
Dept: ONCOLOGY | Facility: CLINIC | Age: 30
End: 2020-11-22

## 2020-11-25 DIAGNOSIS — D69.3 IDIOPATHIC THROMBOCYTOPENIC PURPURA (H): ICD-10-CM

## 2020-11-25 LAB
BASOPHILS # BLD AUTO: 0 10E9/L (ref 0–0.2)
BASOPHILS NFR BLD AUTO: 0.7 %
DIFFERENTIAL METHOD BLD: ABNORMAL
EOSINOPHIL # BLD AUTO: 0.1 10E9/L (ref 0–0.7)
EOSINOPHIL NFR BLD AUTO: 2.4 %
ERYTHROCYTE [DISTWIDTH] IN BLOOD BY AUTOMATED COUNT: 12.5 % (ref 10–15)
HCT VFR BLD AUTO: 48.2 % (ref 40–53)
HGB BLD-MCNC: 16.2 G/DL (ref 13.3–17.7)
IMM GRANULOCYTES # BLD: 0 10E9/L (ref 0–0.4)
IMM GRANULOCYTES NFR BLD: 0.5 %
LYMPHOCYTES # BLD AUTO: 1 10E9/L (ref 0.8–5.3)
LYMPHOCYTES NFR BLD AUTO: 22.9 %
MCH RBC QN AUTO: 29 PG (ref 26.5–33)
MCHC RBC AUTO-ENTMCNC: 33.6 G/DL (ref 31.5–36.5)
MCV RBC AUTO: 86 FL (ref 78–100)
MONOCYTES # BLD AUTO: 0.3 10E9/L (ref 0–1.3)
MONOCYTES NFR BLD AUTO: 8 %
NEUTROPHILS # BLD AUTO: 2.8 10E9/L (ref 1.6–8.3)
NEUTROPHILS NFR BLD AUTO: 65.5 %
NRBC # BLD AUTO: 0 10*3/UL
NRBC BLD AUTO-RTO: 0 /100
PLATELET # BLD AUTO: 142 10E9/L (ref 150–450)
RBC # BLD AUTO: 5.59 10E12/L (ref 4.4–5.9)
WBC # BLD AUTO: 4.2 10E9/L (ref 4–11)

## 2020-11-25 PROCEDURE — 36415 COLL VENOUS BLD VENIPUNCTURE: CPT | Performed by: PATHOLOGY

## 2020-11-25 PROCEDURE — 85025 COMPLETE CBC W/AUTO DIFF WBC: CPT | Performed by: PATHOLOGY

## 2020-11-25 PROCEDURE — 99000 SPECIMEN HANDLING OFFICE-LAB: CPT | Performed by: PATHOLOGY

## 2020-11-25 PROCEDURE — U0003 INFECTIOUS AGENT DETECTION BY NUCLEIC ACID (DNA OR RNA); SEVERE ACUTE RESPIRATORY SYNDROME CORONAVIRUS 2 (SARS-COV-2) (CORONAVIRUS DISEASE [COVID-19]), AMPLIFIED PROBE TECHNIQUE, MAKING USE OF HIGH THROUGHPUT TECHNOLOGIES AS DESCRIBED BY CMS-2020-01-R: HCPCS | Mod: 90 | Performed by: PATHOLOGY

## 2020-11-26 LAB
SARS-COV-2 RNA SPEC QL NAA+PROBE: NOT DETECTED
SPECIMEN SOURCE: NORMAL

## 2020-12-02 ENCOUNTER — APPOINTMENT (OUTPATIENT)
Dept: LAB | Facility: CLINIC | Age: 30
End: 2020-12-02
Attending: INTERNAL MEDICINE
Payer: COMMERCIAL

## 2020-12-02 ENCOUNTER — INFUSION THERAPY VISIT (OUTPATIENT)
Dept: ONCOLOGY | Facility: CLINIC | Age: 30
End: 2020-12-02
Attending: INTERNAL MEDICINE
Payer: COMMERCIAL

## 2020-12-02 VITALS
DIASTOLIC BLOOD PRESSURE: 95 MMHG | HEIGHT: 72 IN | HEART RATE: 101 BPM | SYSTOLIC BLOOD PRESSURE: 152 MMHG | RESPIRATION RATE: 16 BRPM | OXYGEN SATURATION: 96 % | WEIGHT: 215.2 LBS | BODY MASS INDEX: 29.15 KG/M2 | TEMPERATURE: 98.4 F

## 2020-12-02 DIAGNOSIS — D69.6 THROMBOCYTOPENIA (H): ICD-10-CM

## 2020-12-02 DIAGNOSIS — D69.3 IDIOPATHIC THROMBOCYTOPENIC PURPURA (H): Primary | ICD-10-CM

## 2020-12-02 DIAGNOSIS — R76.8 LOW IMMUNOGLOBULIN LEVEL: ICD-10-CM

## 2020-12-02 LAB
BASOPHILS # BLD AUTO: 0 10E9/L (ref 0–0.2)
BASOPHILS NFR BLD AUTO: 0.3 %
DIFFERENTIAL METHOD BLD: ABNORMAL
EOSINOPHIL # BLD AUTO: 0 10E9/L (ref 0–0.7)
EOSINOPHIL NFR BLD AUTO: 0.6 %
ERYTHROCYTE [DISTWIDTH] IN BLOOD BY AUTOMATED COUNT: 12.9 % (ref 10–15)
HCT VFR BLD AUTO: 49.1 % (ref 40–53)
HGB BLD-MCNC: 16.9 G/DL (ref 13.3–17.7)
IMM GRANULOCYTES # BLD: 0 10E9/L (ref 0–0.4)
IMM GRANULOCYTES NFR BLD: 0.3 %
LYMPHOCYTES # BLD AUTO: 1.2 10E9/L (ref 0.8–5.3)
LYMPHOCYTES NFR BLD AUTO: 19 %
MCH RBC QN AUTO: 29.2 PG (ref 26.5–33)
MCHC RBC AUTO-ENTMCNC: 34.4 G/DL (ref 31.5–36.5)
MCV RBC AUTO: 85 FL (ref 78–100)
MONOCYTES # BLD AUTO: 0.6 10E9/L (ref 0–1.3)
MONOCYTES NFR BLD AUTO: 9.2 %
NEUTROPHILS # BLD AUTO: 4.6 10E9/L (ref 1.6–8.3)
NEUTROPHILS NFR BLD AUTO: 70.6 %
NRBC # BLD AUTO: 0 10*3/UL
NRBC BLD AUTO-RTO: 0 /100
PLATELET # BLD AUTO: 143 10E9/L (ref 150–450)
RBC # BLD AUTO: 5.78 10E12/L (ref 4.4–5.9)
WBC # BLD AUTO: 6.5 10E9/L (ref 4–11)

## 2020-12-02 PROCEDURE — 96365 THER/PROPH/DIAG IV INF INIT: CPT

## 2020-12-02 PROCEDURE — 96413 CHEMO IV INFUSION 1 HR: CPT

## 2020-12-02 PROCEDURE — 96415 CHEMO IV INFUSION ADDL HR: CPT

## 2020-12-02 PROCEDURE — 250N000013 HC RX MED GY IP 250 OP 250 PS 637: Performed by: INTERNAL MEDICINE

## 2020-12-02 PROCEDURE — 85025 COMPLETE CBC W/AUTO DIFF WBC: CPT | Performed by: INTERNAL MEDICINE

## 2020-12-02 PROCEDURE — G0463 HOSPITAL OUTPT CLINIC VISIT: HCPCS | Mod: 25

## 2020-12-02 PROCEDURE — 96366 THER/PROPH/DIAG IV INF ADDON: CPT

## 2020-12-02 PROCEDURE — 258N000003 HC RX IP 258 OP 636: Performed by: INTERNAL MEDICINE

## 2020-12-02 PROCEDURE — 250N000011 HC RX IP 250 OP 636: Performed by: INTERNAL MEDICINE

## 2020-12-02 PROCEDURE — 96375 TX/PRO/DX INJ NEW DRUG ADDON: CPT

## 2020-12-02 RX ORDER — ACETAMINOPHEN 325 MG/1
650 TABLET ORAL ONCE
Status: COMPLETED | OUTPATIENT
Start: 2020-12-02 | End: 2020-12-02

## 2020-12-02 RX ORDER — DIPHENHYDRAMINE HCL 25 MG
50 CAPSULE ORAL ONCE
Status: CANCELLED
Start: 2020-12-09

## 2020-12-02 RX ORDER — ACETAMINOPHEN 325 MG/1
650 TABLET ORAL ONCE
Status: CANCELLED
Start: 2020-12-09

## 2020-12-02 RX ORDER — LOSARTAN POTASSIUM 50 MG/1
50 TABLET ORAL DAILY
COMMUNITY
Start: 2020-11-24 | End: 2023-09-18

## 2020-12-02 RX ORDER — METHYLPREDNISOLONE SODIUM SUCCINATE 125 MG/2ML
125 INJECTION, POWDER, LYOPHILIZED, FOR SOLUTION INTRAMUSCULAR; INTRAVENOUS ONCE
Status: COMPLETED | OUTPATIENT
Start: 2020-12-02 | End: 2020-12-02

## 2020-12-02 RX ORDER — DIPHENHYDRAMINE HYDROCHLORIDE 50 MG/ML
50 INJECTION INTRAMUSCULAR; INTRAVENOUS ONCE
Status: COMPLETED | OUTPATIENT
Start: 2020-12-02 | End: 2020-12-02

## 2020-12-02 RX ORDER — MEPERIDINE HYDROCHLORIDE 25 MG/ML
25 INJECTION INTRAMUSCULAR; INTRAVENOUS; SUBCUTANEOUS ONCE
Status: COMPLETED | OUTPATIENT
Start: 2020-12-02 | End: 2020-12-02

## 2020-12-02 RX ORDER — DIPHENHYDRAMINE HCL 25 MG
50 CAPSULE ORAL ONCE
Status: COMPLETED | OUTPATIENT
Start: 2020-12-02 | End: 2020-12-02

## 2020-12-02 RX ADMIN — MEPERIDINE HYDROCHLORIDE 25 MG: 25 INJECTION INTRAMUSCULAR; INTRAVENOUS; SUBCUTANEOUS at 09:26

## 2020-12-02 RX ADMIN — DIPHENHYDRAMINE HYDROCHLORIDE 50 MG: 25 CAPSULE ORAL at 07:36

## 2020-12-02 RX ADMIN — DIPHENHYDRAMINE HYDROCHLORIDE 50 MG: 50 INJECTION, SOLUTION INTRAMUSCULAR; INTRAVENOUS at 09:23

## 2020-12-02 RX ADMIN — METHYLPREDNISOLONE SODIUM SUCCINATE 125 MG: 125 INJECTION, POWDER, FOR SOLUTION INTRAMUSCULAR; INTRAVENOUS at 09:23

## 2020-12-02 RX ADMIN — SODIUM CHLORIDE 250 ML: 9 INJECTION, SOLUTION INTRAVENOUS at 08:17

## 2020-12-02 RX ADMIN — RITUXIMAB-ABBS 800 MG: 10 INJECTION, SOLUTION INTRAVENOUS at 08:17

## 2020-12-02 RX ADMIN — ACETAMINOPHEN 650 MG: 325 TABLET ORAL at 07:36

## 2020-12-02 ASSESSMENT — MIFFLIN-ST. JEOR: SCORE: 1964.27

## 2020-12-02 ASSESSMENT — PAIN SCALES - GENERAL: PAINLEVEL: NO PAIN (0)

## 2020-12-02 NOTE — PATIENT INSTRUCTIONS
Contact Numbers    [facility] Main Line: [phone]  [facility] Triage and after hours / weekends / holidays: [phone]      Please call the triage or after hours line if you experience a temperature greater than or equal to 100.4, shaking chills, have uncontrolled nausea, vomiting and/or diarrhea, dizziness, shortness of breath, chest pain, bleeding, unexplained bruising, or if you have any other new/concerning symptoms, questions or concerns.      If you are having any concerning symptoms or wish to speak to a provider before your next infusion visit, please call your care coordinator or triage to notify them so we can adequately serve you.     If you need a refill on a narcotic prescription or other medication, please call before your infusion appointment.       December 2020 Sunday Monday Tuesday Wednesday Thursday Friday Saturday             1     2    LAB PERIPHERAL   6:30 AM   (15 min.)    [facility] DRAW   [facility] ONC INFUSION 360   7:00 AM   (360 min.)    ONCOLOGY INFUSION   [facility] 3     4     5       6     7     8     9    LAB PERIPHERAL   6:30 AM   (15 min.)    [facility] DRAW   [facility] ONC INFUSION 360   7:00 AM   (360 min.)    ONCOLOGY INFUSION   [facility]    VIDEO VISIT RETURN  10:45 AM   (30 min.)   Madelin Wright MD   [facility] 10     11     12       13     14     15     16    LAB PERIPHERAL   6:30 AM   (15 min.)   [facility] DRAW   [facility] ONC INFUSION 360   7:00 AM   (360 min.)    ONCOLOGY INFUSION   [facility] 17     18     19       20     21     22     23    LAB PERIPHERAL   6:30 AM   (15 min.)    [facility] DRAW   [facility] ONC INFUSION 360   7:00 AM   (360 min.)    ONCOLOGY INFUSION   [facility] 24     25     26       27     28     29     30     31 January 2021 Sunday Monday Tuesday Wednesday Thursday Friday Saturday                            1     2       3     4     5     6     7     8     9       10     11     12     13     14     15     16       17     18     19     20     21     22     23       24     25     26     27     28     29     30       31                                                  Recent Results (from the past 24 hour(s))   CBC with platelets differential    Collection Time: 12/02/20  6:41 AM   Result Value Ref Range    WBC 6.5 4.0 - 11.0 10e9/L    RBC Count 5.78 4.4 - 5.9 10e12/L    Hemoglobin 16.9 13.3 - 17.7 g/dL    Hematocrit 49.1 40.0 - 53.0 %    MCV 85 78 - 100 fl    MCH 29.2 26.5 - 33.0 pg    MCHC 34.4 31.5 - 36.5 g/dL    RDW 12.9 10.0 - 15.0 %    Platelet Count 143 (L) 150 - 450 10e9/L    Diff Method Automated Method     % Neutrophils 70.6 %    % Lymphocytes 19.0 %    % Monocytes 9.2 %    % Eosinophils 0.6 %    % Basophils 0.3 %    % Immature Granulocytes 0.3 %    Nucleated RBCs 0 0 /100    Absolute Neutrophil 4.6 1.6 - 8.3 10e9/L    Absolute Lymphocytes 1.2 0.8 - 5.3 10e9/L    Absolute Monocytes 0.6 0.0 - 1.3 10e9/L    Absolute Eosinophils 0.0 0.0 - 0.7 10e9/L    Absolute Basophils 0.0 0.0 - 0.2 10e9/L    Abs Immature Granulocytes 0.0 0 - 0.4 10e9/L    Absolute Nucleated RBC 0.0

## 2020-12-02 NOTE — PROGRESS NOTES
"Infusion Nursing Note:  Roberto Spence presents today for Cycle 1 Day 1 rituximab-abbs.    Patient seen by provider today: No   present during visit today: Not Applicable.    Note: Patient new to oncology infusion room and is receiving rituximab-abbs for the first time. Pt oriented to infusion room and call light. New patient teaching done previously. Pt received new pt folder prior to coming to infusion. Writer reinforced chemotherapy teaching/side effects and schedule and provided written handout from [employer]."[employer] Patient instructed to call triage with questions/concerns or if he/she has chills and/or temperature greater than or equal to 100.5. Triage number: [phone]; After hours, weekends, or holidays, call [phone] and ask for oncology doctor on call.    At 0921 (rituximab-abbs rate at 150cc/hr), pt asked another RN for a blanket. This RN overheard conversation and approached bay to assess. Pt states \"I just feel really cold. Mary shaky.\" This RN stopped rituximab-abbs and placed on call light for help. Pt began to rigor, and rigors became quite significant over the next 1-2 minutes. All vitals stable. Pt denied any other symptoms. Warm blankets and hot packs used for comfort. Solu-medrol and Benadryl given per hypersensitivity orders. Rigors did not subside, so Demerol given. Within 1-2 minutes after Demerol given, pt's rigors began to subside. All other vitals continued to be stable and pt continued to deny any other symptoms. This RN spoke to Dr. Wright to provide update; per Dr. Wright, ok to proceed with rituximab-abbs at half the previous rate once all symptoms resolved. Pt agreeable to this plan.      Rituximab-abbs restarted at 1006 at a rate of 75cc/hr, and increased by 50cc/hr q30 minutes to a maximum rate of 400cc/hr. Pt tolerated remainder of infusion without incident.     Pain: denies    Intravenous Access:  Peripheral IV placed.    Treatment Conditions:  Lab Results   Component " Value Date    HGB 16.9 12/02/2020     Lab Results   Component Value Date    WBC 6.5 12/02/2020      Lab Results   Component Value Date    ANEU 4.6 12/02/2020     Lab Results   Component Value Date     12/02/2020      No treatment parameters.      Post Infusion Assessment:  Patient tolerated infusion poorly due to : Hypersensitivity: Did patient have a hypersensitivity reaction? : Yes  Drug or Product name: rituximab-abbs  Were pre-meds administered?: Yes  If Yes, what pre-meds were administered?: Acetaminophen (Tylenol);Diphenhydramine (Benadryl)  First or Subsequent treatment: First time receiving  Rate of infusion when patient had hypersensitivity reaction: 150  Time the hypersensitivity reaction was first recognized: 0921  Symptoms observed or reported (select all that apply): Rigors  Interventions/treatment following reaction: Infusion stopped;Hypersensitivity medications administered  What hypersensitivity medications were administered?: DiphendydrAMINE (benadryl);Meperidine (Demerol);Methylprednisolone;NaCl 0.9% Bolus  Name of provider notified: Kyle  Time provider notified: 6240  Type of notification (select all that apply): Paged/Phone     Blood return noted pre and post infusion.  Site patent and intact, free from redness, edema or discomfort.  No evidence of extravasations.  Access discontinued per protocol.       Discharge Plan:   Patient declined prescription refills.  Copy of AVS reviewed with patient and/or family.  Patient will return 12/9 for next appointment.  Patient discharged in stable condition accompanied by: self.  Departure Mode: Ambulatory.  Face to Face time: 10 minutes.    BARBARA THOMPSON RN

## 2020-12-02 NOTE — NURSING NOTE
Chief Complaint   Patient presents with     Blood Draw     Labs drawn via PIV placed by RN in lab. VS taken. Pt checked in for next appt     Labs drawn from PIV placed by RN. Line flushed with saline. Vitals taken. Pt checked in for appointment(s).    Es JASON RN PHN BSN  BMT/Oncology Lab

## 2020-12-07 RX ORDER — DIPHENHYDRAMINE HCL 25 MG
50 CAPSULE ORAL ONCE
Status: CANCELLED
Start: 2020-12-09

## 2020-12-07 RX ORDER — ACETAMINOPHEN 325 MG/1
650 TABLET ORAL ONCE
Status: CANCELLED
Start: 2020-12-09

## 2020-12-09 ENCOUNTER — APPOINTMENT (OUTPATIENT)
Dept: LAB | Facility: CLINIC | Age: 30
End: 2020-12-09
Attending: INTERNAL MEDICINE
Payer: COMMERCIAL

## 2020-12-09 ENCOUNTER — INFUSION THERAPY VISIT (OUTPATIENT)
Dept: ONCOLOGY | Facility: CLINIC | Age: 30
End: 2020-12-09
Attending: INTERNAL MEDICINE
Payer: COMMERCIAL

## 2020-12-09 VITALS
SYSTOLIC BLOOD PRESSURE: 143 MMHG | BODY MASS INDEX: 29.48 KG/M2 | TEMPERATURE: 96.8 F | OXYGEN SATURATION: 96 % | RESPIRATION RATE: 16 BRPM | WEIGHT: 217.4 LBS | HEART RATE: 79 BPM | DIASTOLIC BLOOD PRESSURE: 94 MMHG

## 2020-12-09 DIAGNOSIS — D69.3 IDIOPATHIC THROMBOCYTOPENIC PURPURA (H): Primary | ICD-10-CM

## 2020-12-09 DIAGNOSIS — D69.3 IDIOPATHIC THROMBOCYTOPENIC PURPURA (H): ICD-10-CM

## 2020-12-09 LAB
BASOPHILS # BLD AUTO: 0.1 10E9/L (ref 0–0.2)
BASOPHILS NFR BLD AUTO: 0.8 %
DIFFERENTIAL METHOD BLD: NORMAL
EOSINOPHIL # BLD AUTO: 0.1 10E9/L (ref 0–0.7)
EOSINOPHIL NFR BLD AUTO: 2.1 %
ERYTHROCYTE [DISTWIDTH] IN BLOOD BY AUTOMATED COUNT: 12.8 % (ref 10–15)
HCT VFR BLD AUTO: 48.5 % (ref 40–53)
HGB BLD-MCNC: 16.8 G/DL (ref 13.3–17.7)
IMM GRANULOCYTES # BLD: 0 10E9/L (ref 0–0.4)
IMM GRANULOCYTES NFR BLD: 0.7 %
LYMPHOCYTES # BLD AUTO: 2 10E9/L (ref 0.8–5.3)
LYMPHOCYTES NFR BLD AUTO: 32.6 %
MCH RBC QN AUTO: 28.8 PG (ref 26.5–33)
MCHC RBC AUTO-ENTMCNC: 34.6 G/DL (ref 31.5–36.5)
MCV RBC AUTO: 83 FL (ref 78–100)
MONOCYTES # BLD AUTO: 0.6 10E9/L (ref 0–1.3)
MONOCYTES NFR BLD AUTO: 9.9 %
NEUTROPHILS # BLD AUTO: 3.3 10E9/L (ref 1.6–8.3)
NEUTROPHILS NFR BLD AUTO: 53.9 %
NRBC # BLD AUTO: 0 10*3/UL
NRBC BLD AUTO-RTO: 0 /100
PLATELET # BLD AUTO: 195 10E9/L (ref 150–450)
RBC # BLD AUTO: 5.83 10E12/L (ref 4.4–5.9)
WBC # BLD AUTO: 6.1 10E9/L (ref 4–11)

## 2020-12-09 PROCEDURE — 85025 COMPLETE CBC W/AUTO DIFF WBC: CPT | Mod: GT | Performed by: INTERNAL MEDICINE

## 2020-12-09 PROCEDURE — 96415 CHEMO IV INFUSION ADDL HR: CPT

## 2020-12-09 PROCEDURE — 250N000013 HC RX MED GY IP 250 OP 250 PS 637: Performed by: INTERNAL MEDICINE

## 2020-12-09 PROCEDURE — 99213 OFFICE O/P EST LOW 20 MIN: CPT | Mod: 95 | Performed by: INTERNAL MEDICINE

## 2020-12-09 PROCEDURE — 96413 CHEMO IV INFUSION 1 HR: CPT

## 2020-12-09 PROCEDURE — 96365 THER/PROPH/DIAG IV INF INIT: CPT

## 2020-12-09 PROCEDURE — 250N000011 HC RX IP 250 OP 636: Performed by: INTERNAL MEDICINE

## 2020-12-09 PROCEDURE — 258N000003 HC RX IP 258 OP 636: Performed by: INTERNAL MEDICINE

## 2020-12-09 PROCEDURE — 96366 THER/PROPH/DIAG IV INF ADDON: CPT

## 2020-12-09 RX ORDER — PREDNISONE 5 MG/1
TABLET ORAL
Qty: 42 TABLET | Refills: 0 | Status: SHIPPED | OUTPATIENT
Start: 2020-12-09 | End: 2023-09-18

## 2020-12-09 RX ORDER — DIPHENHYDRAMINE HCL 25 MG
50 CAPSULE ORAL ONCE
Status: COMPLETED | OUTPATIENT
Start: 2020-12-09 | End: 2020-12-09

## 2020-12-09 RX ORDER — ACETAMINOPHEN 325 MG/1
650 TABLET ORAL ONCE
Status: COMPLETED | OUTPATIENT
Start: 2020-12-09 | End: 2020-12-09

## 2020-12-09 RX ADMIN — DIPHENHYDRAMINE HYDROCHLORIDE 50 MG: 25 CAPSULE ORAL at 07:32

## 2020-12-09 RX ADMIN — RITUXIMAB-ABBS 800 MG: 10 INJECTION, SOLUTION INTRAVENOUS at 08:06

## 2020-12-09 RX ADMIN — ACETAMINOPHEN 650 MG: 325 TABLET ORAL at 07:32

## 2020-12-09 RX ADMIN — SODIUM CHLORIDE 250 ML: 9 INJECTION, SOLUTION INTRAVENOUS at 07:24

## 2020-12-09 ASSESSMENT — PAIN SCALES - GENERAL: PAINLEVEL: NO PAIN (0)

## 2020-12-09 NOTE — PROGRESS NOTES
Patient contacted and confirmed that he agrees to telephone. Visit. Was originally going to be video visit, but his smart phone battery , so had to use a phone with no video capability.     Hematology Clinic visit  Telephone visit  PROBLEM LIST:  1) ITP- presented with platelet count of 1k on 10/20/17. Responsive to prednisone and IvIg. In remission for >2 years, relapsed 20, and again 10/29/20.  2) leukopenia, thrombocytopenia since at least - Bone marrow bx on 12 show hypocellular marrow 20-40%, no dysplasia, normal cytogenetics, normal flow cytometry.   2) h/o mild iron deficiency  3) anxiety  4) Celiac disease, diagnosed 2015      Interim History: Mr. Casiano is having telephone visit for f/u of ITp. He is s/p 1 dose or rituximab and is receiving a dose today. He had an infusion reaction with the first dose of rituximab, managed with benadryl and steroids.  So far today no difficulty. He s still on prednisone 20 mg daily.     Labs:  Results for BEVERLY CASIANO (MRN [id number]) as of 2020 11:05   Ref. Range 2020 06:46   WBC Latest Ref Range: 4.0 - 11.0 10e9/L 6.1   Hemoglobin Latest Ref Range: 13.3 - 17.7 g/dL 16.8   Hematocrit Latest Ref Range: 40.0 - 53.0 % 48.5   Platelet Count Latest Ref Range: 150 - 450 10e9/L 195   RBC Count Latest Ref Range: 4.4 - 5.9 10e12/L 5.83   MCV Latest Ref Range: 78 - 100 fl 83   MCH Latest Ref Range: 26.5 - 33.0 pg 28.8   MCHC Latest Ref Range: 31.5 - 36.5 g/dL 34.6   RDW Latest Ref Range: 10.0 - 15.0 % 12.8   Diff Method Unknown Automated Method   % Neutrophils Latest Units: % 53.9   % Lymphocytes Latest Units: % 32.6   % Monocytes Latest Units: % 9.9   % Eosinophils Latest Units: % 2.1   % Basophils Latest Units: % 0.8   % Immature Granulocytes Latest Units: % 0.7   Nucleated RBCs Latest Ref Range: 0 /100 0   Absolute Neutrophil Latest Ref Range: 1.6 - 8.3 10e9/L 3.3   Absolute Lymphocytes Latest Ref Range: 0.8 - 5.3 10e9/L 2.0   Absolute Monocytes  Latest Ref Range: 0.0 - 1.3 10e9/L 0.6   Absolute Eosinophils Latest Ref Range: 0.0 - 0.7 10e9/L 0.1   Absolute Basophils Latest Ref Range: 0.0 - 0.2 10e9/L 0.1   Abs Immature Granulocytes Latest Ref Range: 0 - 0.4 10e9/L 0.0   Absolute Nucleated RBC Unknown 0.0     Assessment and Recommendation:  1. ITP- repeated relapse- He is tolerating rituxiimab well. Hopefully this will place him in a prolonged remission  Needs to wean steroids. Decrease to 10 mg daily x 2 weeks, then 5 mg daily x 2 weeks, then stop.   Labs weekly next 2 weeks, then every 2 weeks for 1 month, then monthly- assuming counts are staying >150k.     2. COVID 19- he may be at a bit higher risk of complications if he contracts the COVID19 virus. When vaccine becomes avialable, I strongly recommend that he take the vaccine, potential benefit far outweigh the small risk of serious side effects.     RTC 2 months, Alka Benson and 6 months Kyle.     Telephone time   Start: 11:08 am  End 11:19 am  Total time 11 min    Madelin Wright MD  Hematology

## 2020-12-09 NOTE — NURSING NOTE
Chief Complaint   Patient presents with     Blood Draw     labs drawn with IV start by rn in lab.  vital signs taken.     Labs drawn with IV start by RN in lab.  Vital signs taken.  Pt checked in to next appointment.    Kennedi Daley RN

## 2020-12-09 NOTE — LETTER
2020         RE: Roberto Casiano  [address] [facility] [address]        Dear Colleague,    Thank you for referring your patient, Roberto Casiano, to the [facility]. Please see a copy of my visit note below.    Patient contacted and confirmed that he agrees to telephone. Visit. Was originally going to be video visit, but his smart phone battery , so had to use a phone with no video capability.     Hematology Clinic visit  Telephone visit  PROBLEM LIST:  1) ITP- presented with platelet count of 1k on 10/20/17. Responsive to prednisone and IvIg. In remission for >2 years, relapsed 20, and again 10/29/20.  2) leukopenia, thrombocytopenia since at least - Bone marrow bx on 12 show hypocellular marrow 20-40%, no dysplasia, normal cytogenetics, normal flow cytometry.   2) h/o mild iron deficiency  3) anxiety  4) Celiac disease, diagnosed 2015      Interim History: Mr. Casiano is having telephone visit for f/u of ITp. He is s/p 1 dose or rituximab and is receiving a dose today. He had an infusion reaction with the first dose of rituximab, managed with benadryl and steroids.  So far today no difficulty. He s still on prednisone 20 mg daily.     Labs:  Results for ROBERTO CASIANO (MRN [id number]) as of 2020 11:05   Ref. Range 2020 06:46   WBC Latest Ref Range: 4.0 - 11.0 10e9/L 6.1   Hemoglobin Latest Ref Range: 13.3 - 17.7 g/dL 16.8   Hematocrit Latest Ref Range: 40.0 - 53.0 % 48.5   Platelet Count Latest Ref Range: 150 - 450 10e9/L 195   RBC Count Latest Ref Range: 4.4 - 5.9 10e12/L 5.83   MCV Latest Ref Range: 78 - 100 fl 83   MCH Latest Ref Range: 26.5 - 33.0 pg 28.8   MCHC Latest Ref Range: 31.5 - 36.5 g/dL 34.6   RDW Latest Ref Range: 10.0 - 15.0 % 12.8   Diff Method Unknown Automated Method   % Neutrophils Latest Units: % 53.9   % Lymphocytes Latest Units: % 32.6   % Monocytes Latest Units: % 9.9   % Eosinophils Latest Units: % 2.1   %  Basophils Latest Units: % 0.8   % Immature Granulocytes Latest Units: % 0.7   Nucleated RBCs Latest Ref Range: 0 /100 0   Absolute Neutrophil Latest Ref Range: 1.6 - 8.3 10e9/L 3.3   Absolute Lymphocytes Latest Ref Range: 0.8 - 5.3 10e9/L 2.0   Absolute Monocytes Latest Ref Range: 0.0 - 1.3 10e9/L 0.6   Absolute Eosinophils Latest Ref Range: 0.0 - 0.7 10e9/L 0.1   Absolute Basophils Latest Ref Range: 0.0 - 0.2 10e9/L 0.1   Abs Immature Granulocytes Latest Ref Range: 0 - 0.4 10e9/L 0.0   Absolute Nucleated RBC Unknown 0.0     Assessment and Recommendation:  1. ITP- repeated relapse- He is tolerating rituxiimab well. Hopefully this will place him in a prolonged remission  Needs to wean steroids. Decrease to 10 mg daily x 2 weeks, then 5 mg daily x 2 weeks, then stop.   Labs weekly next 2 weeks, then every 2 weeks for 1 month, then monthly- assuming counts are staying >150k.     2. COVID 19- he may be at a bit higher risk of complications if he contracts the COVID19 virus. When vaccine becomes avialable, I strongly recommend that he take the vaccine, potential benefit far outweigh the small risk of serious side effects.     RTC 2 months, Alka Benson and 6 months Kyle.     Telephone time   Start: 11:08 am  End 11:19 am  Total time 11 min    Madelin Wright MD  Hematology

## 2020-12-16 ENCOUNTER — INFUSION THERAPY VISIT (OUTPATIENT)
Dept: ONCOLOGY | Facility: CLINIC | Age: 30
End: 2020-12-16
Attending: INTERNAL MEDICINE
Payer: COMMERCIAL

## 2020-12-16 VITALS
RESPIRATION RATE: 16 BRPM | OXYGEN SATURATION: 98 % | TEMPERATURE: 98.1 F | SYSTOLIC BLOOD PRESSURE: 148 MMHG | HEART RATE: 71 BPM | DIASTOLIC BLOOD PRESSURE: 95 MMHG | BODY MASS INDEX: 29.67 KG/M2 | WEIGHT: 218.8 LBS

## 2020-12-16 DIAGNOSIS — D69.3 IDIOPATHIC THROMBOCYTOPENIC PURPURA (H): Primary | ICD-10-CM

## 2020-12-16 LAB
BASOPHILS # BLD AUTO: 0 10E9/L (ref 0–0.2)
BASOPHILS NFR BLD AUTO: 0.7 %
DIFFERENTIAL METHOD BLD: NORMAL
EOSINOPHIL # BLD AUTO: 0.1 10E9/L (ref 0–0.7)
EOSINOPHIL NFR BLD AUTO: 2.2 %
ERYTHROCYTE [DISTWIDTH] IN BLOOD BY AUTOMATED COUNT: 13.1 % (ref 10–15)
HCT VFR BLD AUTO: 47.9 % (ref 40–53)
HGB BLD-MCNC: 16.3 G/DL (ref 13.3–17.7)
IMM GRANULOCYTES # BLD: 0 10E9/L (ref 0–0.4)
IMM GRANULOCYTES NFR BLD: 0.5 %
LYMPHOCYTES # BLD AUTO: 1.7 10E9/L (ref 0.8–5.3)
LYMPHOCYTES NFR BLD AUTO: 30.3 %
MCH RBC QN AUTO: 29.2 PG (ref 26.5–33)
MCHC RBC AUTO-ENTMCNC: 34 G/DL (ref 31.5–36.5)
MCV RBC AUTO: 86 FL (ref 78–100)
MONOCYTES # BLD AUTO: 0.5 10E9/L (ref 0–1.3)
MONOCYTES NFR BLD AUTO: 8.3 %
NEUTROPHILS # BLD AUTO: 3.2 10E9/L (ref 1.6–8.3)
NEUTROPHILS NFR BLD AUTO: 58 %
NRBC # BLD AUTO: 0 10*3/UL
NRBC BLD AUTO-RTO: 0 /100
PLATELET # BLD AUTO: 165 10E9/L (ref 150–450)
RBC # BLD AUTO: 5.58 10E12/L (ref 4.4–5.9)
WBC # BLD AUTO: 5.5 10E9/L (ref 4–11)

## 2020-12-16 PROCEDURE — 258N000003 HC RX IP 258 OP 636: Performed by: INTERNAL MEDICINE

## 2020-12-16 PROCEDURE — 96366 THER/PROPH/DIAG IV INF ADDON: CPT

## 2020-12-16 PROCEDURE — 96415 CHEMO IV INFUSION ADDL HR: CPT

## 2020-12-16 PROCEDURE — 96413 CHEMO IV INFUSION 1 HR: CPT

## 2020-12-16 PROCEDURE — 36415 COLL VENOUS BLD VENIPUNCTURE: CPT

## 2020-12-16 PROCEDURE — 250N000011 HC RX IP 250 OP 636: Performed by: INTERNAL MEDICINE

## 2020-12-16 PROCEDURE — 85025 COMPLETE CBC W/AUTO DIFF WBC: CPT | Performed by: INTERNAL MEDICINE

## 2020-12-16 PROCEDURE — G0463 HOSPITAL OUTPT CLINIC VISIT: HCPCS | Mod: 25

## 2020-12-16 PROCEDURE — 96365 THER/PROPH/DIAG IV INF INIT: CPT

## 2020-12-16 PROCEDURE — 250N000013 HC RX MED GY IP 250 OP 250 PS 637: Performed by: INTERNAL MEDICINE

## 2020-12-16 PROCEDURE — 96375 TX/PRO/DX INJ NEW DRUG ADDON: CPT

## 2020-12-16 RX ORDER — METHYLPREDNISOLONE SODIUM SUCCINATE 125 MG/2ML
125 INJECTION, POWDER, LYOPHILIZED, FOR SOLUTION INTRAMUSCULAR; INTRAVENOUS ONCE
Status: CANCELLED
Start: 2020-12-16 | End: 2020-12-16

## 2020-12-16 RX ORDER — DIPHENHYDRAMINE HCL 25 MG
50 CAPSULE ORAL ONCE
Status: COMPLETED | OUTPATIENT
Start: 2020-12-16 | End: 2020-12-16

## 2020-12-16 RX ORDER — METHYLPREDNISOLONE SODIUM SUCCINATE 125 MG/2ML
125 INJECTION, POWDER, LYOPHILIZED, FOR SOLUTION INTRAMUSCULAR; INTRAVENOUS ONCE
Status: COMPLETED | OUTPATIENT
Start: 2020-12-16 | End: 2020-12-16

## 2020-12-16 RX ORDER — METHYLPREDNISOLONE SODIUM SUCCINATE 125 MG/2ML
125 INJECTION, POWDER, LYOPHILIZED, FOR SOLUTION INTRAMUSCULAR; INTRAVENOUS ONCE
Status: CANCELLED
Start: 2020-12-23 | End: 2020-12-23

## 2020-12-16 RX ORDER — ACETAMINOPHEN 325 MG/1
650 TABLET ORAL ONCE
Status: CANCELLED
Start: 2020-12-23

## 2020-12-16 RX ORDER — DIPHENHYDRAMINE HCL 25 MG
50 CAPSULE ORAL ONCE
Status: CANCELLED
Start: 2020-12-23

## 2020-12-16 RX ORDER — ACETAMINOPHEN 325 MG/1
650 TABLET ORAL ONCE
Status: COMPLETED | OUTPATIENT
Start: 2020-12-16 | End: 2020-12-16

## 2020-12-16 RX ADMIN — METHYLPREDNISOLONE SODIUM SUCCINATE 125 MG: 125 INJECTION, POWDER, FOR SOLUTION INTRAMUSCULAR; INTRAVENOUS at 08:54

## 2020-12-16 RX ADMIN — ACETAMINOPHEN 650 MG: 325 TABLET ORAL at 07:21

## 2020-12-16 RX ADMIN — DIPHENHYDRAMINE HYDROCHLORIDE 50 MG: 25 CAPSULE ORAL at 07:21

## 2020-12-16 RX ADMIN — RITUXIMAB-ABBS 800 MG: 10 INJECTION, SOLUTION INTRAVENOUS at 08:02

## 2020-12-16 RX ADMIN — SODIUM CHLORIDE 250 ML: 9 INJECTION, SOLUTION INTRAVENOUS at 08:02

## 2020-12-16 ASSESSMENT — PAIN SCALES - GENERAL: PAINLEVEL: NO PAIN (0)

## 2020-12-16 NOTE — PATIENT INSTRUCTIONS
[facility] Triage and after hours / weekends / holidays:  [phone]    Please call the triage or after hours line if you experience a temperature greater than or equal to 100.5, shaking chills, have uncontrolled nausea, vomiting and/or diarrhea, dizziness, shortness of breath, chest pain, bleeding, unexplained bruising, or if you have any other new/concerning symptoms, questions or concerns.      If you are having any concerning symptoms or wish to speak to a provider before your next infusion visit, please call your care coordinator or triage to notify them so we can adequately serve you.     If you need a refill on a narcotic prescription or other medication, please call before your infusion appointment.                 December 2020 Sunday Monday Tuesday Wednesday Thursday Friday Saturday             1     2    LAB PERIPHERAL   6:30 AM   (15 min.)    [facility] LAB DRAW   [facility] ONC INFUSION 360   7:00 AM   (360 min.)    ONCOLOGY INFUSION   [facility] 3     4     5       6     7     8     9    LAB PERIPHERAL   6:30 AM   (15 min.)    [facility] LAB DRAW   [facility] ONC INFUSION 360   7:00 AM   (360 min.)    ONCOLOGY INFUSION   [facility]    VIDEO VISIT RETURN  10:45 AM   (30 min.)   Madelin Wright MD   [facility] 10     11     12       13     14     15     16    LAB PERIPHERAL   6:30 AM   (15 min.)    [facility] LAB DRAW   [facility] ONC INFUSION 360   7:00 AM   (360 min.)    ONCOLOGY INFUSION   [facility] 17     18     19       20     21     22     23    LAB PERIPHERAL   6:30 AM   (15 min.)    [facility] LAB DRAW   [facility] ONC INFUSION 360   7:00 AM   (360 min.)    ONCOLOGY INFUSION   [facility] 24      25     26       27 28 29 30 31 January 2021 Sunday Monday Tuesday Wednesday Thursday Friday Saturday                            1     2       3     4     5     6     7     8     9       10     11     12     13     14     15     16       17     18     19     20     21     22     23       24     25     26     27     28     29 30 31                                                   Lab Results:  Recent Results (from the past 12 hour(s))   *CBC with platelets differential    Collection Time: 12/16/20  6:48 AM   Result Value Ref Range    WBC 5.5 4.0 - 11.0 10e9/L    RBC Count 5.58 4.4 - 5.9 10e12/L    Hemoglobin 16.3 13.3 - 17.7 g/dL    Hematocrit 47.9 40.0 - 53.0 %    MCV 86 78 - 100 fl    MCH 29.2 26.5 - 33.0 pg    MCHC 34.0 31.5 - 36.5 g/dL    RDW 13.1 10.0 - 15.0 %    Platelet Count 165 150 - 450 10e9/L    Diff Method Automated Method     % Neutrophils 58.0 %    % Lymphocytes 30.3 %    % Monocytes 8.3 %    % Eosinophils 2.2 %    % Basophils 0.7 %    % Immature Granulocytes 0.5 %    Nucleated RBCs 0 0 /100    Absolute Neutrophil 3.2 1.6 - 8.3 10e9/L    Absolute Lymphocytes 1.7 0.8 - 5.3 10e9/L    Absolute Monocytes 0.5 0.0 - 1.3 10e9/L    Absolute Eosinophils 0.1 0.0 - 0.7 10e9/L    Absolute Basophils 0.0 0.0 - 0.2 10e9/L    Abs Immature Granulocytes 0.0 0 - 0.4 10e9/L    Absolute Nucleated RBC 0.0

## 2020-12-16 NOTE — PROGRESS NOTES
Infusion Nursing Note:  Roberto Spence presents today for Day 15 Cycle 1 Rituximab-abbs.    Patient seen by provider today: No   present during visit today: Not Applicable.    Note: Patient presents to infusion feeling well. Patient denies pain and states no acute complaints or concerns needing to be addressed today. Specifically, patient denies s/s of infection such as fever, shortness of breath, cough, chest pain, sore throat, or changes in taste/smell.    At 0824 with Rituximab infusing at 100mls/hr, patient alerted staff that he felt cold. No rigors noted, vitals stable. Patient states this is an abnormal sensation for him and that this is the sensation he felt before he had rigors with his first infusion reaction. Bear hugger applied for comfort.    TORB. 12/16/2020. 0846. Dr. Wright. Anthony Keating RN. Give 125mg solu-medrol now and before next weeks infusion with other pre-meds. OK to titrate restart by 100mls/hour every 30 minutes     At 0926 at the time of Rituximab restart, patient states cold sensation has improved. Rituximab infusion completed at 1250. Patient slept throughout infusion since restart at 0926. Patient states he felt much better then he did previously and denied any new symptom development. Patient voiced understanding to seek medical care if any new symptoms develop at home.     Intravenous Access:  Peripheral IV placed.    Treatment Conditions:  Lab Results   Component Value Date    HGB 16.3 12/16/2020     Lab Results   Component Value Date    WBC 5.5 12/16/2020      Lab Results   Component Value Date    ANEU 3.2 12/16/2020     Lab Results   Component Value Date     12/16/2020      Results reviewed, labs MET treatment parameters, ok to proceed with treatment.      Post Infusion Assessment:  Patient tolerated infusion poorly due to : Hypersensitivity: Did patient have a hypersensitivity reaction? : Yes  Drug or Product name: Rituximab  Were pre-meds administered?: Yes  If  Yes, what pre-meds were administered?: Acetaminophen (Tylenol);Diphenhydramine (Benadryl)  First or Subsequent treatment: Subsequent infusion  Rate of infusion when patient had hypersensitivity reaction: 100  Time the hypersensitivity reaction was first recognized: 0824  Symptoms observed or reported (select all that apply): Chills  Interventions/treatment following reaction: Infusion stopped;Additional observation period added;Hypersensitivity medications administered  What hypersensitivity medications were administered?: Methylprednisolone  Name of provider notified: Dr. Wright  Time provider notified: 0846  Type of notification (select all that apply): Paged/Phone  Was the patient re-challenged today?: Yes - tolerated well  Rituximab-abbs infused per the following: Rate of 100ms/hour increasing every 30 minutes by 100mls confirmed by pharmacist Mary with patient having a reaction with 1st dose and tolerating 2nd dose  100mls/hour x30 minutes. Infusion stopped. Feeling abnormally cold symptom. VSS. No other symptoms noted  Infusion restarted at  50mls/hour x30 minutes  100mls/hour x30 minutes  150mls/hour x30 minutes  200mls/hour x30 minutes  300mls/hour x30 minutes  400mls/hour x remainder of infusion  Blood return noted pre and post infusion.  Site patent and intact, free from redness, edema or discomfort.  No evidence of extravasations.  Access discontinued per protocol.       Discharge Plan:   Patient declined prescription refills.  Discharge instructions reviewed with: Patient.  Patient and/or family verbalized understanding of discharge instructions and all questions answered.  AVS to patient via ZOOM TVHART.  Patient will return 12/23 for next appointment.   Patient discharged in stable condition accompanied by: self.  Departure Mode: Ambulatory.  Face to Face time: 5 minuets.    Anthony Keating RN

## 2020-12-16 NOTE — NURSING NOTE
Chief Complaint   Patient presents with     Blood Draw     labs drawn via  by RN in lab      BP (!) 148/104 (BP Location: Right arm, Patient Position: Sitting, Cuff Size: Adult Large)   Pulse 78   Temp 97.9  F (36.6  C) (Oral)   Resp 18   Wt 99.2 kg (218 lb 12.8 oz)   SpO2 96%   BMI 29.67 kg/m      PIV placed to l;eft wrist by RN in lab. Line flushed with normal saline. Pt tolerated well.  Checked in for next appointment.    Sugar Donaldson RN on 12/16/2020 at 6:54 AM

## 2020-12-22 RX ORDER — ACETAMINOPHEN 325 MG/1
650 TABLET ORAL ONCE
Status: CANCELLED
Start: 2020-12-23

## 2020-12-22 RX ORDER — DIPHENHYDRAMINE HCL 25 MG
50 CAPSULE ORAL ONCE
Status: CANCELLED
Start: 2020-12-23

## 2020-12-22 RX ORDER — METHYLPREDNISOLONE SODIUM SUCCINATE 125 MG/2ML
125 INJECTION, POWDER, LYOPHILIZED, FOR SOLUTION INTRAMUSCULAR; INTRAVENOUS ONCE
Status: CANCELLED
Start: 2020-12-23 | End: 2020-12-23

## 2020-12-23 ENCOUNTER — INFUSION THERAPY VISIT (OUTPATIENT)
Dept: ONCOLOGY | Facility: CLINIC | Age: 30
End: 2020-12-23
Attending: INTERNAL MEDICINE
Payer: COMMERCIAL

## 2020-12-23 ENCOUNTER — APPOINTMENT (OUTPATIENT)
Dept: LAB | Facility: CLINIC | Age: 30
End: 2020-12-23
Attending: INTERNAL MEDICINE
Payer: COMMERCIAL

## 2020-12-23 VITALS
RESPIRATION RATE: 18 BRPM | TEMPERATURE: 97.2 F | SYSTOLIC BLOOD PRESSURE: 158 MMHG | DIASTOLIC BLOOD PRESSURE: 94 MMHG | OXYGEN SATURATION: 96 % | HEART RATE: 77 BPM | WEIGHT: 217.2 LBS | BODY MASS INDEX: 29.45 KG/M2

## 2020-12-23 DIAGNOSIS — D69.3 IDIOPATHIC THROMBOCYTOPENIC PURPURA (H): Primary | ICD-10-CM

## 2020-12-23 LAB
BASOPHILS # BLD AUTO: 0 10E9/L (ref 0–0.2)
BASOPHILS NFR BLD AUTO: 0.7 %
DIFFERENTIAL METHOD BLD: NORMAL
EOSINOPHIL # BLD AUTO: 0.1 10E9/L (ref 0–0.7)
EOSINOPHIL NFR BLD AUTO: 1.6 %
ERYTHROCYTE [DISTWIDTH] IN BLOOD BY AUTOMATED COUNT: 13.2 % (ref 10–15)
HCT VFR BLD AUTO: 49.4 % (ref 40–53)
HGB BLD-MCNC: 16.8 G/DL (ref 13.3–17.7)
IMM GRANULOCYTES # BLD: 0 10E9/L (ref 0–0.4)
IMM GRANULOCYTES NFR BLD: 0.7 %
LYMPHOCYTES # BLD AUTO: 2.1 10E9/L (ref 0.8–5.3)
LYMPHOCYTES NFR BLD AUTO: 37.8 %
MCH RBC QN AUTO: 29.4 PG (ref 26.5–33)
MCHC RBC AUTO-ENTMCNC: 34 G/DL (ref 31.5–36.5)
MCV RBC AUTO: 87 FL (ref 78–100)
MONOCYTES # BLD AUTO: 0.5 10E9/L (ref 0–1.3)
MONOCYTES NFR BLD AUTO: 9.7 %
NEUTROPHILS # BLD AUTO: 2.7 10E9/L (ref 1.6–8.3)
NEUTROPHILS NFR BLD AUTO: 49.5 %
NRBC # BLD AUTO: 0 10*3/UL
NRBC BLD AUTO-RTO: 0 /100
PLATELET # BLD AUTO: 170 10E9/L (ref 150–450)
RBC # BLD AUTO: 5.71 10E12/L (ref 4.4–5.9)
WBC # BLD AUTO: 5.5 10E9/L (ref 4–11)

## 2020-12-23 PROCEDURE — 85025 COMPLETE CBC W/AUTO DIFF WBC: CPT | Performed by: INTERNAL MEDICINE

## 2020-12-23 PROCEDURE — 96413 CHEMO IV INFUSION 1 HR: CPT

## 2020-12-23 PROCEDURE — 250N000011 HC RX IP 250 OP 636: Performed by: INTERNAL MEDICINE

## 2020-12-23 PROCEDURE — 96366 THER/PROPH/DIAG IV INF ADDON: CPT

## 2020-12-23 PROCEDURE — 96375 TX/PRO/DX INJ NEW DRUG ADDON: CPT

## 2020-12-23 PROCEDURE — 250N000013 HC RX MED GY IP 250 OP 250 PS 637: Performed by: INTERNAL MEDICINE

## 2020-12-23 PROCEDURE — 96365 THER/PROPH/DIAG IV INF INIT: CPT

## 2020-12-23 PROCEDURE — 96415 CHEMO IV INFUSION ADDL HR: CPT

## 2020-12-23 PROCEDURE — 258N000003 HC RX IP 258 OP 636: Performed by: INTERNAL MEDICINE

## 2020-12-23 RX ORDER — METHYLPREDNISOLONE SODIUM SUCCINATE 125 MG/2ML
125 INJECTION, POWDER, LYOPHILIZED, FOR SOLUTION INTRAMUSCULAR; INTRAVENOUS ONCE
Status: COMPLETED | OUTPATIENT
Start: 2020-12-23 | End: 2020-12-23

## 2020-12-23 RX ORDER — DIPHENHYDRAMINE HCL 25 MG
50 CAPSULE ORAL ONCE
Status: COMPLETED | OUTPATIENT
Start: 2020-12-23 | End: 2020-12-23

## 2020-12-23 RX ORDER — ACETAMINOPHEN 325 MG/1
650 TABLET ORAL ONCE
Status: COMPLETED | OUTPATIENT
Start: 2020-12-23 | End: 2020-12-23

## 2020-12-23 RX ADMIN — METHYLPREDNISOLONE SODIUM SUCCINATE 125 MG: 125 INJECTION, POWDER, FOR SOLUTION INTRAMUSCULAR; INTRAVENOUS at 07:43

## 2020-12-23 RX ADMIN — DIPHENHYDRAMINE HYDROCHLORIDE 50 MG: 25 CAPSULE ORAL at 07:41

## 2020-12-23 RX ADMIN — SODIUM CHLORIDE 250 ML: 9 INJECTION, SOLUTION INTRAVENOUS at 07:44

## 2020-12-23 RX ADMIN — RITUXIMAB-ABBS 800 MG: 10 INJECTION, SOLUTION INTRAVENOUS at 08:17

## 2020-12-23 RX ADMIN — ACETAMINOPHEN 650 MG: 325 TABLET ORAL at 07:41

## 2020-12-23 ASSESSMENT — PAIN SCALES - GENERAL: PAINLEVEL: NO PAIN (0)

## 2020-12-23 NOTE — PROGRESS NOTES
Infusion Nursing Note:  Roberto Spence presents today for Cycle 1 Day 22 Rituximab-abbs.    Patient seen by provider today: No   present during visit today: Not Applicable.    Note: Patient reports he is feeling well, he denies any new complaints. Pt reacted to Rituxan last week while it was running at 100 mL/hr. Solumedrol administered at that time, pt re-challenged and tolerated the remainder of the infusion. Solumedrol administered prior to infusion today, per new therapy plan orders. Rituxan administered at the same rates as the re-challenge last week.     50 mL/hr for 30 minutes  100 mL/hr for 30 minutes  150 mL/hr 30 minutes  200 mL/hr for 30 minutes  300 mL/hr for 30 minutes  400 mL/hr for the remainder of the infusion    Intravenous Access:  Peripheral IV placed by lab.    Treatment Conditions:  Lab Results   Component Value Date    HGB 16.8 12/23/2020     Lab Results   Component Value Date    WBC 5.5 12/23/2020      Lab Results   Component Value Date    ANEU 2.7 12/23/2020     Lab Results   Component Value Date     12/23/2020      Results reviewed, labs MET treatment parameters, ok to proceed with treatment.      Post Infusion Assessment:  Patient tolerated infusion without incident.  Blood return noted pre and post infusion.  Site patent and intact, free from redness, edema or discomfort.  No evidence of extravasations.  Access discontinued per protocol.       Discharge Plan:   Patient declined prescription refills.  Discharge instructions reviewed with: Patient.  Patient and/or family verbalized understanding of discharge instructions and all questions answered.  AVS to patient via BluedT.  Patient will return 2/18/20201 for next provider appointment.   Patient discharged in stable condition accompanied by: self.  Departure Mode: Ambulatory.    nAa Case RN

## 2020-12-23 NOTE — NURSING NOTE
Chief Complaint   Patient presents with     Blood Draw     Labs drawn via PIV by RN in lab. VS taken     Labs drawn from PIV placed by RN. Line flushed with saline. Vitals taken-pt states BP is around his baseline. Pt checked in for appointment(s).    Edna Harkins RN

## 2021-01-16 DIAGNOSIS — D69.3 IDIOPATHIC THROMBOCYTOPENIC PURPURA (H): ICD-10-CM

## 2021-01-19 RX ORDER — PREDNISONE 5 MG/1
TABLET ORAL
Qty: 42 TABLET | Refills: 0 | OUTPATIENT
Start: 2021-01-19

## 2021-02-18 ENCOUNTER — VIRTUAL VISIT (OUTPATIENT)
Dept: ONCOLOGY | Facility: CLINIC | Age: 31
End: 2021-02-18
Attending: PHYSICIAN ASSISTANT
Payer: COMMERCIAL

## 2021-02-18 DIAGNOSIS — D69.3 IDIOPATHIC THROMBOCYTOPENIC PURPURA (H): Primary | ICD-10-CM

## 2021-02-18 PROCEDURE — 99214 OFFICE O/P EST MOD 30 MIN: CPT | Mod: 95 | Performed by: PHYSICIAN ASSISTANT

## 2021-02-18 PROCEDURE — 999N001193 HC VIDEO/TELEPHONE VISIT; NO CHARGE

## 2021-02-18 NOTE — LETTER
2/18/2021         RE: Roberto Spence  [address]  [facility] [address]        Dear Colleague,    Thank you for referring your patient, Roberto Spence, to the [facility]. Please see a copy of my visit note below.    Roberto is a 30 year old who is being evaluated via a billable video visit.      How would you like to obtain your AVS? MyChart  If the video visit is dropped, the invitation should be resent by: Send to e-mail at: [email or web address]  Will anyone else be joining your video visit? No     Vitals - Patient Reported  Weight (Patient Reported): 98.9 kg (218 lb)  Height (Patient Reported): 182.9 cm (6')  BMI (Based on Pt Reported Ht/Wt): 29.57  Pain Score: No Pain (0)    Mckayla Slater CMA February 18, 2021  2:56 PM         Video Start Time: 3:21 PM  Video-Visit Details    Type of service:  Video Visit    Video End Time:3:30 PM    Originating Location (pt. Location): Home    Distant Location (provider location):  [facility]     Platform used for Video Visit: [facility]        Hematology Clinic Follow-Up    PROBLEM LIST:  1) ITP- presented with platelet count of 1k on 10/20/17. Responsive to prednisone and IvIg. In remission for >2 years, relapsed 5/16/20, and again 10/29/20. Received weekly Rituxan in December 2020.   2) leukopenia, thrombocytopenia since at least 2012- Bone marrow bx on 12/5/12 show hypocellular marrow 20-40%, no dysplasia, normal cytogenetics, normal flow cytometry.   2) h/o mild iron deficiency  3) anxiety  4) Celiac disease, diagnosed June 2015      Interim History: Roberto is feeling well today. He has some fatigue which is stable. He has been working remotely 100% and is preparing for a baby arriving in April. No recent fevers/chills or infectious concerns. No issues with appetite. No easy bruising or bleeding.     Current Outpatient Medications   Medication     losartan (COZAAR) 50 MG tablet     multivitamin  w/minerals (MULTI-VITAMIN) tablet     predniSONE (DELTASONE) 5 MG tablet     No current facility-administered medications for this visit.         Allergies   Allergen Reactions     Gluten Meal Unknown     Nsaids Unknown     Has low platelets         Labs: Reviewed labs in [facility] from [facility]. Plts on 1/11 were 185, 187 on 1/20, and 192 on 2/3       Assessment and Recommendation:   ITP- repeated relapse- Most recent relapse treated with Rituxan with great response. He is now off prednisone as well. Labs show stable platelet count above 150K so as long as labs next week show stability will back off lab monitoring to monthly at that point. Dr. Wright follow-up in June. He will call with any interval concerns.     20 minutes spent on the date of the encounter doing chart review, review of outside records, review of test results, patient visit and documentation     Alka Benson PA-C                 Again, thank you for allowing me to participate in the care of your patient.        Sincerely,        Alka Benson PA-C

## 2021-02-18 NOTE — PROGRESS NOTES
Roberto is a 30 year old who is being evaluated via a billable video visit.      How would you like to obtain your AVS? MyChart  If the video visit is dropped, the invitation should be resent by: Send to e-mail at: [email or web address]  Will anyone else be joining your video visit? No     Vitals - Patient Reported  Weight (Patient Reported): 98.9 kg (218 lb)  Height (Patient Reported): 182.9 cm (6')  BMI (Based on Pt Reported Ht/Wt): 29.57  Pain Score: No Pain (0)    Mckayla Slater [facility] February 18, 2021  2:56 PM         Video Start Time: 3:21 PM  Video-Visit Details    Type of service:  Video Visit    Video End Time:3:30 PM    Originating Location (pt. Location): Home    Distant Location (provider location):  [facility]     Platform used for Video Visit: [facility]        Hematology Clinic Follow-Up    PROBLEM LIST:  1) ITP- presented with platelet count of 1k on 10/20/17. Responsive to prednisone and IvIg. In remission for >2 years, relapsed 5/16/20, and again 10/29/20. Received weekly Rituxan in December 2020.   2) leukopenia, thrombocytopenia since at least 2012- Bone marrow bx on 12/5/12 show hypocellular marrow 20-40%, no dysplasia, normal cytogenetics, normal flow cytometry.   2) h/o mild iron deficiency  3) anxiety  4) Celiac disease, diagnosed June 2015      Interim History: Roberto is feeling well today. He has some fatigue which is stable. He has been working remotely 100% and is preparing for a baby arriving in April. No recent fevers/chills or infectious concerns. No issues with appetite. No easy bruising or bleeding.     Current Outpatient Medications   Medication     losartan (COZAAR) 50 MG tablet     multivitamin w/minerals (MULTI-VITAMIN) tablet     predniSONE (DELTASONE) 5 MG tablet     No current facility-administered medications for this visit.         Allergies   Allergen Reactions     Gluten Meal Unknown     Nsaids Unknown     Has low platelets         Labs: Reviewed  labs in CareEverywhere from [facility]. Plts on 1/11 were 185, 187 on 1/20, and 192 on 2/3       Assessment and Recommendation:   ITP- repeated relapse- Most recent relapse treated with Rituxan with great response. He is now off prednisone as well. Labs show stable platelet count above 150K so as long as labs next week show stability will back off lab monitoring to monthly at that point. Dr. Wright follow-up in June. He will call with any interval concerns.     20 minutes spent on the date of the encounter doing chart review, review of outside records, review of test results, patient visit and documentation     Alka Benson PA-C

## 2021-04-24 ENCOUNTER — HEALTH MAINTENANCE LETTER (OUTPATIENT)
Age: 31
End: 2021-04-24

## 2021-06-23 ENCOUNTER — VIRTUAL VISIT (OUTPATIENT)
Dept: ONCOLOGY | Facility: CLINIC | Age: 31
End: 2021-06-23
Attending: INTERNAL MEDICINE
Payer: COMMERCIAL

## 2021-06-23 DIAGNOSIS — D69.3 IDIOPATHIC THROMBOCYTOPENIC PURPURA (H): Primary | ICD-10-CM

## 2021-06-23 PROCEDURE — 99212 OFFICE O/P EST SF 10 MIN: CPT | Mod: GT | Performed by: INTERNAL MEDICINE

## 2021-06-23 PROCEDURE — 999N001193 HC VIDEO/TELEPHONE VISIT; NO CHARGE

## 2021-06-23 NOTE — PROGRESS NOTES
Roberto is a 31 year old who is being evaluated via a billable video visit.      How would you like to obtain your AVS? MyChart  If the video visit is dropped, the invitation should be resent by: Send to e-mail at: [email or web address]  Will anyone else be joining your video visit? No      Rose Enrique CMA ([facility])          JENIFER    Hematology Clinic visit  Phone visit- due to technical problems on our end.     PROBLEM LIST:  1) ITP- presented with platelet count of 1k on 10/20/17. Responsive to prednisone and IvIg. In remission for >2 years, relapsed 5/16/20, and again 10/29/20. Rituximab x4 Dec 2020.  2) leukopenia, thrombocytopenia since at least 2012- Bone marrow bx on 12/5/12 show hypocellular marrow 20-40%, no dysplasia, normal cytogenetics, normal flow cytometry.   2) h/o mild iron deficiency  3) anxiety  4) Celiac disease, diagnosed June 2015      Interim History: Mr. Spence is having telephone visit for f/u of ITp. He is s/p  dose or rituximab and is receiving a dose today.      Labs:    [facility] 5/21/21  Platelet 189, Hgb 16.9, WBC 4.9  Assessment and Recommendation:  1. ITP- repeated relapse- In remission after rituximab. No longer on any prednisone.  Continue monthly CB through primary clinic     2. COVID 19- vacccinated    3. Hypertension- adjusting meds through PCP     RTC  manuel, Alka Benson.      Telephone time   Start: 10:19 am  End 10:25am  Total time 6 min     Madelin Wright MD  Hematology

## 2021-06-23 NOTE — LETTER
6/23/2021         RE: Roberto Spence  [address]  [facility] [address]        Dear Colleague,    Thank you for referring your patient, Roberto Spence, to the [facility] CLINIC. Please see a copy of my visit note below.    Roberto is a 31 year old who is being evaluated via a billable video visit.      How would you like to obtain your AVS? MyChart  If the video visit is dropped, the invitation should be resent by: Send to e-mail at: [email or web address]  Will anyone else be joining your video visit? No      Rose Enrique CMA ([facility])          JENIFER    Hematology Clinic visit  Phone visit- due to technical problems on our end.     PROBLEM LIST:  1) ITP- presented with platelet count of 1k on 10/20/17. Responsive to prednisone and IvIg. In remission for >2 years, relapsed 5/16/20, and again 10/29/20. Rituximab x4 Dec 2020.  2) leukopenia, thrombocytopenia since at least 2012- Bone marrow bx on 12/5/12 show hypocellular marrow 20-40%, no dysplasia, normal cytogenetics, normal flow cytometry.   2) h/o mild iron deficiency  3) anxiety  4) Celiac disease, diagnosed June 2015      Interim History: Mr. Spence is having telephone visit for f/u of ITp. He is s/p  dose or rituximab and is receiving a dose today.      Labs:    [facility] 5/21/21  Platelet 189, Hgb 16.9, WBC 4.9  Assessment and Recommendation:  1. ITP- repeated relapse- In remission after rituximab. No longer on any prednisone.  Continue monthly CB through primary clinic     2. COVID 19- vacccinated    3. Hypertension- adjusting meds through PCP     RTC  manuel, Alka Benson.      Telephone time   Start: 10:19 am  End 10:25am  Total time 6 min     Madelin Wright MD  Hematology         Again, thank you for allowing me to participate in the care of your patient.        Sincerely,        Madelin Wright MD

## 2021-08-22 ENCOUNTER — HOSPITAL ENCOUNTER (EMERGENCY)
Facility: CLINIC | Age: 31
Discharge: HOME | End: 2021-08-22
Attending: EMERGENCY MEDICINE | Admitting: EMERGENCY MEDICINE
Payer: COMMERCIAL

## 2021-08-22 ENCOUNTER — APPOINTMENT (OUTPATIENT)
Dept: GENERAL RADIOLOGY | Facility: CLINIC | Age: 31
End: 2021-08-22
Attending: EMERGENCY MEDICINE
Payer: COMMERCIAL

## 2021-08-22 VITALS
DIASTOLIC BLOOD PRESSURE: 76 MMHG | HEART RATE: 80 BPM | OXYGEN SATURATION: 96 % | WEIGHT: 217 LBS | BODY MASS INDEX: 29.42 KG/M2 | SYSTOLIC BLOOD PRESSURE: 139 MMHG | RESPIRATION RATE: 18 BRPM | TEMPERATURE: 97.7 F

## 2021-08-22 DIAGNOSIS — S56.221A: ICD-10-CM

## 2021-08-22 DIAGNOSIS — S51.001A: ICD-10-CM

## 2021-08-22 DIAGNOSIS — S61.511A LACERATION OF RIGHT WRIST, INITIAL ENCOUNTER: ICD-10-CM

## 2021-08-22 PROCEDURE — 76882 US LMTD JT/FCL EVL NVASC XTR: CPT | Mod: RT

## 2021-08-22 PROCEDURE — 73110 X-RAY EXAM OF WRIST: CPT | Mod: RT

## 2021-08-22 PROCEDURE — 99284 EMERGENCY DEPT VISIT MOD MDM: CPT | Mod: 25

## 2021-08-22 PROCEDURE — 250N000013 HC RX MED GY IP 250 OP 250 PS 637: Performed by: EMERGENCY MEDICINE

## 2021-08-22 PROCEDURE — 12002 RPR S/N/AX/GEN/TRNK2.6-7.5CM: CPT

## 2021-08-22 RX ORDER — ACETAMINOPHEN 500 MG
500 TABLET ORAL ONCE
Status: COMPLETED | OUTPATIENT
Start: 2021-08-22 | End: 2021-08-22

## 2021-08-22 RX ADMIN — ACETAMINOPHEN 500 MG: 500 TABLET, FILM COATED ORAL at 12:35

## 2021-08-22 ASSESSMENT — ENCOUNTER SYMPTOMS: WOUND: 1

## 2021-08-22 NOTE — ED PROVIDER NOTES
History   Chief Complaint:  Right wrist laceration    HPI   Roberto Spence is a 31 year old male with history of thrombocytopenia who presents with a right wrist laceration. The patient reports cutting a mirror and accidentally applied too much pressure, causing it to shatter and cut his right wrist. He has normal sensation in his right hand and is able to move his wrist and fingers. He notes there was a lot of bleeding but no pumping blood from the wound. He has pain in his wrist but no pain in his hand. Per [facility], his Tetanus was last updated in 2015.    Review of Systems   Skin: Positive for wound (right wrist).   All other systems reviewed and are negative.      Allergies:  NSAIDs    Medications:  Cozaar  Prednisone    Past Medical History:    ADHD  Celiac disease  Depression  Iron deficiency  Thrombocytopenia  Idiopathic thrombocytopenic purpura  Erectile dysfunction  Decreased libido    Family History:    Coronary artery disease  Thyroid disease  Autism    Social History:  The patient presents by himself.    Physical Exam     Patient Vitals for the past 24 hrs:   BP Temp Temp src Pulse Resp SpO2 Weight   08/22/21 1205 139/76 97.7  F (36.5  C) Temporal 80 18 96 % 98.4 kg (217 lb)       Physical Exam  Musculoskeletal:        Arms:        Gen:  Pleasant, appears stated age.    Eye:   Sclera non-injected.      Extremity Exam: Full AROM of all joints without pain except the following:  R UE  Inspection:  6cm horizontal laceration to the volar/ulnar side of the distal forearm.  Oozing dark blood.  No pulsatile bleeding.  No expanding hematoma.  No visible artery pulsations.  No foreign body.  Shiny white tendon at distal aspect of wound.  Tender/bunched mass proximal to the wound.  Palpation: tender to area of injury.  ROM: Wrist extension, flexion, adduction intact though guarded because of pain.  Strength: Guarded, but intact, as above.    Sensation: distal fine touch in median, radial, and ulnar distributions  intact.  Distal Pulses: intact radial, CR < 2 seconds      Neurologic:    Non-focal exam without asymmetric weakness or numbness.    Fluent speech.    Psychiatric:     Normal affect with appropriate interaction with examiner.    Emergency Department Course   Imaging:    POC US SOFT TISSUE   Final Result   [facility] Procedure Note        Limited Bedside ED Ultrasound for Central Vein Cannulation:      PROCEDURE: PERFORMED BY: Dr. Ani Fischer MD   INDICATIONS/SYMPTOM:  Evaluate for ulnar artery injury   PROBE: High frequency linear probe   BODY LOCATION: The ultrasound was performed on the right ulnar artery.   FINDINGS:Ulnar artery visualized proximal, adjacent to, and distal to the laceration.  Pulsatile flow on color doppler.   INTERPRETATION: Patent artery confirmed with US.     IMAGE DOCUMENTATION: Images were archived to PACs system.      XR Wrist Right 3 Views   Final Result   IMPRESSION: Laceration in the ulnar aspect of the distal ulna. No radiopaque foreign body. No fractures are evident. Normal alignment.              [facility]    -Laceration Repair    Date/Time: 8/22/2021 1:20 PM  Performed by: Ani Fischer MD  Authorized by: Ani Fischer MD       ANESTHESIA (see MAR for exact dosages):     Anesthesia method:  Local infiltration    Local anesthetic:  Bupivacaine 0.5% w/o epi  LACERATION DETAILS     Location:  Hand    Hand location:  R wrist    Length (cm):  6  EXPLORATION:     Wound exploration: wound explored through full range of motion and entire depth of wound probed and visualized      Wound extent: tendon damage      Tendon damage extent:  Complete transection    Tendon repair plan:  Refer for evaluation    Contaminated: no      TREATMENT:     Area cleansed with:  Saline    Amount of cleaning:  Standard    Irrigation solution:  Sterile saline    Irrigation method:  Syringe    SKIN REPAIR     Repair method:  Sutures    Suture size:  4-0    Suture  material:  Nylon    Suture technique:  Simple interrupted    Number of sutures:  8    APPROXIMATION     Approximation:  Close    POST-PROCEDURE DETAILS     Dressing:  Splint for protection      PROCEDURE   Patient Tolerance:  Patient tolerated the procedure well with no immediate complications          Narrative:      Plaster volar splint was applied and after placement I checked and adjusted the fit to ensure proper positioning. Patient was more comfortable with splint in place. Sensation and circulation are intact after splint placement.    Emergency Department Course:    Reviewed:    I reviewed the patient's nursing notes, vitals, past medical records, Care Everywhere.     Assessments:    1227: I performed an exam of the patient and obtained history, as documented above.    1415: I performed a laceration repair and a bedside ultrasound on the patient, see notes above. He is amenable to discharge.     Consults:  1343: I spoke with Orthopedics Hand Surgeon Dr. Hernandez regarding the patient.     Interventions:  1235: Tylenol 500 mg PO    Disposition:  The patient was discharged to home.       Impression & Plan   Medical Decision Making:  This is a 31 year old male otherwise healthy except for history of idiopathic thrombocytopenic purpura who presents today with laceration to the right forearm. On exam, the patient is neurovascularly intact, though wound is quite close to the course of the ulnar artery. The arm and hand appear well perfused. On exam, there is no sign of foreign body nor is there evidence of glass on x-ray. Tetanus is up to date. He does have what appears to be a complete laceration of the flexor carpi ulnaris tendon. I discussed the patient with Dr. Hernandez who was on call for [facility] hand surgery who recommends repairing the wound, placing the patient in splint, and follow up with hand surgery. Patient's information was left on [facility] hotline, and I provided him with [facility] follow up information. No concern  for self inflicted wound.  Return to the ED for any concerns including cool or pale hand, increased pain, redness, swelling, fever, or chills.     Diagnosis:    ICD-10-CM    1. Flexor tendon laceration of right elbow with open wound, initial encounter  S56.221A     S51.001A    2. Laceration of right wrist, initial encounter  S61.511A        Scribe Disclosure:  I, Radha Dean, am serving as a scribe at 12:26 PM on 8/22/2021 to document services personally performed by Ani Fischer MD based on my observations and the provider's statements to me.        Ani Fischer MD  08/23/21 0154       Ani Fischer MD  08/23/21 0226

## 2021-08-22 NOTE — ED TRIAGE NOTES
Pt was cutting a mirror and it shattered causing a wound to his L medial wrist. CMS intact. Bleeding controlled. Pt Aox4. Hx of thrombocytopenia.

## 2021-08-22 NOTE — DISCHARGE INSTRUCTIONS
Your exam today shows that you have likely sustained an injury to one of the tendons in your wrist.  I am referring you to a hand specialist for further evaluation.  At this point, it seems that your nerves and arteries are functioning normally.  Your tetanus is up-to-date.  Your x-ray does not show any glass stuck in your wound.  I recommend keeping the extremity elevated to help with swelling.  You can take Tylenol and ibuprofen as directed and as needed for pain.  Return to the ED if the hand becomes pale, cool, blue, or you develop increased pain, redness streaking up your arm, fevers, chills, or other concerns.  Your sutures should be removed by the hand specialist, as well as the splint.

## 2021-10-03 ENCOUNTER — HEALTH MAINTENANCE LETTER (OUTPATIENT)
Age: 31
End: 2021-10-03

## 2021-10-19 ENCOUNTER — TRANSFERRED RECORDS (OUTPATIENT)
Dept: HEALTH INFORMATION MANAGEMENT | Facility: CLINIC | Age: 31
End: 2021-10-19

## 2021-12-08 ENCOUNTER — TELEPHONE (OUTPATIENT)
Dept: ONCOLOGY | Facility: CLINIC | Age: 31
End: 2021-12-08
Payer: COMMERCIAL

## 2021-12-08 NOTE — CONFIDENTIAL NOTE
Developed COVID 19:   December 2nd took a salivia test which came back positive after having 2 at home tests come back negative.    Started to develop a cough, SOB, feels congestion in lungs and fatigue and muscle soreness.   Advised to utilize the [facility] of health website   [email or web address]/coronavirus/meds.html  to potentially get monoclonal antibodies and for the most up to date information on managing symptoms.   Advised to increase fluid intake, and to go to ER if having SOB, and decreased O2 sats. <90% needs to be seen in the ER.

## 2021-12-09 ENCOUNTER — HOME INFUSION (PRE-WILLOW HOME INFUSION) (OUTPATIENT)
Dept: PHARMACY | Facility: CLINIC | Age: 31
End: 2021-12-09
Payer: COMMERCIAL

## 2021-12-10 NOTE — PROGRESS NOTES
This is a recent snapshot of the patient's [facility] medical record.  For current drug dose and complete information and questions, call [phone] or In Basket pool, [facility] home infusion ([id number])  CSN Number:  [id number]

## 2021-12-12 ENCOUNTER — HOME INFUSION (PRE-WILLOW HOME INFUSION) (OUTPATIENT)
Dept: PHARMACY | Facility: CLINIC | Age: 31
End: 2021-12-12
Payer: COMMERCIAL

## 2022-02-13 NOTE — ED NOTES
Pt stts she slipped and caught herself from falling on Wed morning however has had significant back pain to the right lumbar radiating into right buttock. PMH sciatic pain, and \"low\" herniated discs. Pt stts 2 Alleve did help a little   Roberto comes in for abnormal platelet counts today and open sores in his mouth as well as petechiae developing on his extremities throughout the day today. He has a PMH of Celiac's disease and there is concern for his iron and platelet counts.

## 2022-02-23 NOTE — PROGRESS NOTES
This is a recent snapshot of the patient's [facility] medical record.  For current drug dose and complete information and questions, call [phone] or In Basket pool, [facility]v home infusion ([id number])  CSN Number:  [id number]

## 2022-03-17 ENCOUNTER — ONCOLOGY VISIT (OUTPATIENT)
Dept: ONCOLOGY | Facility: CLINIC | Age: 32
End: 2022-03-17
Attending: INTERNAL MEDICINE
Payer: COMMERCIAL

## 2022-03-17 ENCOUNTER — APPOINTMENT (OUTPATIENT)
Dept: LAB | Facility: CLINIC | Age: 32
End: 2022-03-17
Attending: INTERNAL MEDICINE
Payer: COMMERCIAL

## 2022-03-17 VITALS
TEMPERATURE: 97.8 F | RESPIRATION RATE: 16 BRPM | DIASTOLIC BLOOD PRESSURE: 89 MMHG | SYSTOLIC BLOOD PRESSURE: 135 MMHG | BODY MASS INDEX: 27.73 KG/M2 | WEIGHT: 204.5 LBS | HEART RATE: 64 BPM | OXYGEN SATURATION: 98 %

## 2022-03-17 DIAGNOSIS — D69.3 IDIOPATHIC THROMBOCYTOPENIC PURPURA (H): ICD-10-CM

## 2022-03-17 LAB
BASOPHILS # BLD AUTO: 0 10E3/UL (ref 0–0.2)
BASOPHILS NFR BLD AUTO: 1 %
EOSINOPHIL # BLD AUTO: 0.1 10E3/UL (ref 0–0.7)
EOSINOPHIL NFR BLD AUTO: 4 %
ERYTHROCYTE [DISTWIDTH] IN BLOOD BY AUTOMATED COUNT: 12.8 % (ref 10–15)
HCT VFR BLD AUTO: 46.6 % (ref 40–53)
HGB BLD-MCNC: 16 G/DL (ref 13.3–17.7)
IMM GRANULOCYTES # BLD: 0 10E3/UL
IMM GRANULOCYTES NFR BLD: 0 %
LYMPHOCYTES # BLD AUTO: 1.2 10E3/UL (ref 0.8–5.3)
LYMPHOCYTES NFR BLD AUTO: 34 %
MCH RBC QN AUTO: 28.7 PG (ref 26.5–33)
MCHC RBC AUTO-ENTMCNC: 34.3 G/DL (ref 31.5–36.5)
MCV RBC AUTO: 84 FL (ref 78–100)
MONOCYTES # BLD AUTO: 0.5 10E3/UL (ref 0–1.3)
MONOCYTES NFR BLD AUTO: 15 %
NEUTROPHILS # BLD AUTO: 1.6 10E3/UL (ref 1.6–8.3)
NEUTROPHILS NFR BLD AUTO: 46 %
NRBC # BLD AUTO: 0 10E3/UL
NRBC BLD AUTO-RTO: 0 /100
PLATELET # BLD AUTO: 163 10E3/UL (ref 150–450)
RBC # BLD AUTO: 5.57 10E6/UL (ref 4.4–5.9)
WBC # BLD AUTO: 3.6 10E3/UL (ref 4–11)

## 2022-03-17 PROCEDURE — 36415 COLL VENOUS BLD VENIPUNCTURE: CPT | Performed by: INTERNAL MEDICINE

## 2022-03-17 PROCEDURE — G0463 HOSPITAL OUTPT CLINIC VISIT: HCPCS

## 2022-03-17 PROCEDURE — 85014 HEMATOCRIT: CPT | Performed by: INTERNAL MEDICINE

## 2022-03-17 PROCEDURE — 99213 OFFICE O/P EST LOW 20 MIN: CPT | Performed by: INTERNAL MEDICINE

## 2022-03-17 ASSESSMENT — PAIN SCALES - GENERAL: PAINLEVEL: NO PAIN (0)

## 2022-03-17 NOTE — NURSING NOTE
"Oncology Rooming Note    March 17, 2022 9:19 AM   Roberto Spence is a 31 year old male who presents for:    Chief Complaint   Patient presents with     Blood Draw     Labs drawn via  by RN. Vitals taken.     Oncology Clinic Visit     Thrombocytopenia      Initial Vitals: /89 (BP Location: Right arm)   Pulse 64   Temp 97.8  F (36.6  C) (Oral)   Resp 16   Wt 92.8 kg (204 lb 8 oz)   SpO2 98%   BMI 27.73 kg/m   Estimated body mass index is 27.73 kg/m  as calculated from the following:    Height as of 12/2/20: 1.829 m (6' 0.01\").    Weight as of this encounter: 92.8 kg (204 lb 8 oz). Body surface area is 2.17 meters squared.  No Pain (0) Comment: Data Unavailable   No LMP for male patient.  Allergies reviewed: Yes  Medications reviewed: Yes    Medications: Medication refills not needed today.  Pharmacy name entered into [facility]:    [id number] [place] [address] [facility] [address] [place] [address]  [id number] [place], [place] - [address] -- [place], [place] - [address] [place]    Clinical concerns: NONE       Jimena Ortega LPN            "

## 2022-03-17 NOTE — PROGRESS NOTES
Hematology Clinic visit  In person     PROBLEM LIST:  1) ITP- presented with platelet count of 1k on 10/20/17. Responsive to prednisone and IvIg. In remission for >2 years, relapsed 5/16/20, and again 10/29/20. Rituximab x4 Dec 2020.  2) leukopenia, thrombocytopenia since at least 2012- Bone marrow bx on 12/5/12 show hypocellular marrow 20-40%, no dysplasia, normal cytogenetics, normal flow cytometry.   2) h/o mild iron deficiency  3) anxiety  4) Celiac disease, diagnosed June 2015      Interim History: Mr. Spence is here for follow-up of ITP.  Last visit was by telephone 6/23/2021.  He has been feeling well.  No problems with bruising or petechiae.  No epistaxis, melena, hematochezia.    Since I last saw him, he did have an a Covid infection, received monoclonal antibody therapy and did not need to be hospitalized.    He suffered a laceration of his right wrist when trying to cut a mirror on 8/22/2021.  The tendon was cut and he required repair of that.  He feels that its totally healed now.    He continues on a gluten-free diet.  He says if he accidentally eats some gluten, causes diarrhea.  He is trying exercise using stationary bike will run outside when it is warmer.  He has a 1-year-old at home, who needed some surgery which added to some of his stress over the past year.    Review of Systems:  As in history above. The rest of the >10 point ROS is negative.    PHYSICAL EXAMINATION:  /89 (BP Location: Right arm)   Pulse 64   Temp 97.8  F (36.6  C) (Oral)   Resp 16   Wt 92.8 kg (204 lb 8 oz)   SpO2 98%   BMI 27.73 kg/m      General appearance:  Patient is 31 year old man in no acute distress.     HEENT:  No pallor, icterus, or mucositis.  No thyromegaly.   Lymph nodes:  No cervical, supraclavicular, axillary, or inguinal lymphadenopathy.   Lungs:  Clear to auscultation bilaterally.   Heart:  Regular rate and rhythm; no S3 S4 or murmer.     Abdomen:  Positive bowel sounds, soft and nontender,  nondistended.  No hepatomegaly. No splenomegaly appreciated.    Extremities: Well-healed surgical scar right wrist.  No joint swelling or tenderness.  No ankle edema.     Skin:  No rash, no petechiae or ecchymoses.      Labs:  Results for BEVERLY CASIANO (MRN [id number]) as of 3/17/2022 09:24   Ref. Range 3/17/2022 08:36   WBC Latest Ref Range: 4.0 - 11.0 10e3/uL 3.6 (L)   Hemoglobin Latest Ref Range: 13.3 - 17.7 g/dL 16.0   Hematocrit Latest Ref Range: 40.0 - 53.0 % 46.6   Platelet Count Latest Ref Range: 150 - 450 10e3/uL 163   RBC Count Latest Ref Range: 4.40 - 5.90 10e6/uL 5.57   MCV Latest Ref Range: 78 - 100 fL 84   MCH Latest Ref Range: 26.5 - 33.0 pg 28.7   MCHC Latest Ref Range: 31.5 - 36.5 g/dL 34.3   RDW Latest Ref Range: 10.0 - 15.0 % 12.8   % Neutrophils Latest Units: % 46   % Lymphocytes Latest Units: % 34   % Monocytes Latest Units: % 15   % Eosinophils Latest Units: % 4   % Basophils Latest Units: % 1   Absolute Basophils Latest Ref Range: 0.0 - 0.2 10e3/uL 0.0   Absolute Eosinophils Latest Ref Range: 0.0 - 0.7 10e3/uL 0.1   Absolute Immature Granulocytes Latest Ref Range: <=0.4 10e3/uL 0.0   Absolute Lymphocytes Latest Ref Range: 0.8 - 5.3 10e3/uL 1.2   Absolute Monocytes Latest Ref Range: 0.0 - 1.3 10e3/uL 0.5   % Immature Granulocytes Latest Units: % 0   Absolute Neutrophils Latest Ref Range: 1.6 - 8.3 10e3/uL 1.6         Assessment and Recommendation:  1. ITP- repeated relapse- In remission after rituximab. No longer on any prednisone.  He has been quite stable.  Check CBC every 6 months, but I will include a as needed in case he develops any symptoms of petechiae or bruising, he can go in to have his CBC checked.     2. COVID 19- vacccinated Pfizer 3/15/2021, 4/5/2021, 11/18/2021     3. Hypertension- adjusting meds through PCP     Labs 6 to 12 months, RTC 12 months with DARVIN    Time: I spent a total of 20 minutes on the day of the visit. Please see the note for further information on patient  assessment and treatment.     Madelin Wright MD

## 2022-03-17 NOTE — NURSING NOTE
Chief Complaint   Patient presents with     Blood Draw     Labs drawn via  by RN. Vitals taken.     Labs collected from venipuncture by RN. Vitals taken. Checked in for appointment(s).    Iliana Pedroza RN

## 2022-03-17 NOTE — LETTER
3/17/2022     RE: Roberto Spence  [address]  [facility] [address]        Dear Colleague,    Thank you for referring your patient, Roberto Spence, to the [facility]. Please see a copy of my visit note below.    Hematology Clinic visit  In person     PROBLEM LIST:  1) ITP- presented with platelet count of 1k on 10/20/17. Responsive to prednisone and IvIg. In remission for >2 years, relapsed 5/16/20, and again 10/29/20. Rituximab x4 Dec 2020.  2) leukopenia, thrombocytopenia since at least 2012- Bone marrow bx on 12/5/12 show hypocellular marrow 20-40%, no dysplasia, normal cytogenetics, normal flow cytometry.   2) h/o mild iron deficiency  3) anxiety  4) Celiac disease, diagnosed June 2015      Interim History: Mr. Sepnce is here for follow-up of ITP.  Last visit was by telephone 6/23/2021.  He has been feeling well.  No problems with bruising or petechiae.  No epistaxis, melena, hematochezia.    Since I last saw him, he did have an a Covid infection, received monoclonal antibody therapy and did not need to be hospitalized.    He suffered a laceration of his right wrist when trying to cut a mirror on 8/22/2021.  The tendon was cut and he required repair of that.  He feels that its totally healed now.    He continues on a gluten-free diet.  He says if he accidentally eats some gluten, causes diarrhea.  He is trying exercise using stationary bike will run outside when it is warmer.  He has a 1-year-old at home, who needed some surgery which added to some of his stress over the past year.    Review of Systems:  As in history above. The rest of the >10 point ROS is negative.    PHYSICAL EXAMINATION:  /89 (BP Location: Right arm)   Pulse 64   Temp 97.8  F (36.6  C) (Oral)   Resp 16   Wt 92.8 kg (204 lb 8 oz)   SpO2 98%   BMI 27.73 kg/m      General appearance:  Patient is 31 year old man in no acute distress.     HEENT:  No pallor, icterus, or mucositis.  No thyromegaly.    Lymph nodes:  No cervical, supraclavicular, axillary, or inguinal lymphadenopathy.   Lungs:  Clear to auscultation bilaterally.   Heart:  Regular rate and rhythm; no S3 S4 or murmer.     Abdomen:  Positive bowel sounds, soft and nontender, nondistended.  No hepatomegaly. No splenomegaly appreciated.    Extremities: Well-healed surgical scar right wrist.  No joint swelling or tenderness.  No ankle edema.     Skin:  No rash, no petechiae or ecchymoses.      Labs:  Results for BEVERLY CASIANO (MRN [id number]) as of 3/17/2022 09:24   Ref. Range 3/17/2022 08:36   WBC Latest Ref Range: 4.0 - 11.0 10e3/uL 3.6 (L)   Hemoglobin Latest Ref Range: 13.3 - 17.7 g/dL 16.0   Hematocrit Latest Ref Range: 40.0 - 53.0 % 46.6   Platelet Count Latest Ref Range: 150 - 450 10e3/uL 163   RBC Count Latest Ref Range: 4.40 - 5.90 10e6/uL 5.57   MCV Latest Ref Range: 78 - 100 fL 84   MCH Latest Ref Range: 26.5 - 33.0 pg 28.7   MCHC Latest Ref Range: 31.5 - 36.5 g/dL 34.3   RDW Latest Ref Range: 10.0 - 15.0 % 12.8   % Neutrophils Latest Units: % 46   % Lymphocytes Latest Units: % 34   % Monocytes Latest Units: % 15   % Eosinophils Latest Units: % 4   % Basophils Latest Units: % 1   Absolute Basophils Latest Ref Range: 0.0 - 0.2 10e3/uL 0.0   Absolute Eosinophils Latest Ref Range: 0.0 - 0.7 10e3/uL 0.1   Absolute Immature Granulocytes Latest Ref Range: <=0.4 10e3/uL 0.0   Absolute Lymphocytes Latest Ref Range: 0.8 - 5.3 10e3/uL 1.2   Absolute Monocytes Latest Ref Range: 0.0 - 1.3 10e3/uL 0.5   % Immature Granulocytes Latest Units: % 0   Absolute Neutrophils Latest Ref Range: 1.6 - 8.3 10e3/uL 1.6         Assessment and Recommendation:  1. ITP- repeated relapse- In remission after rituximab. No longer on any prednisone.  He has been quite stable.  Check CBC every 6 months, but I will include a as needed in case he develops any symptoms of petechiae or bruising, he can go in to have his CBC checked.     2. COVID 19- vacccinated Pfizer 3/15/2021,  4/5/2021, 11/18/2021     3. Hypertension- adjusting meds through PCP     Labs 6 to 12 months, RTC 12 months with DARVIN    Time: I spent a total of 20 minutes on the day of the visit. Please see the note for further information on patient assessment and treatment.     Madelin Wright MD

## 2022-03-23 ENCOUNTER — PATIENT OUTREACH (OUTPATIENT)
Dept: ONCOLOGY | Facility: CLINIC | Age: 32
End: 2022-03-23
Payer: COMMERCIAL

## 2022-05-15 ENCOUNTER — HEALTH MAINTENANCE LETTER (OUTPATIENT)
Age: 32
End: 2022-05-15

## 2022-06-22 DIAGNOSIS — D69.3 IDIOPATHIC THROMBOCYTOPENIC PURPURA (H): Primary | ICD-10-CM

## 2022-09-11 ENCOUNTER — HEALTH MAINTENANCE LETTER (OUTPATIENT)
Age: 32
End: 2022-09-11

## 2023-02-09 ENCOUNTER — PATIENT OUTREACH (OUTPATIENT)
Dept: ONCOLOGY | Facility: CLINIC | Age: 33
End: 2023-02-09
Payer: COMMERCIAL

## 2023-06-03 ENCOUNTER — HEALTH MAINTENANCE LETTER (OUTPATIENT)
Age: 33
End: 2023-06-03

## 2023-06-08 NOTE — LETTER
December 9, 2020      RE: Roberto Spence  [address] [facility] [address]         To Whom It May Concern:    Mr Robetro Spence has a medical condition that makes him higher risk for complications if he contracts the SarsCoV-2 (COVID19) virus. He requires work accomodations that allow for social distancing, mask wearing, and if possible work from home over the next 12 months.     Sincerely,      Madelin Wright MD  Hematology                             [Joint Pain] : joint pain [Negative] : Heme/Lymph [FreeTextEntry9] : Bilateral hip pain

## 2023-08-07 DIAGNOSIS — D69.3 IDIOPATHIC THROMBOCYTOPENIC PURPURA (H): Primary | ICD-10-CM

## 2023-08-07 NOTE — PROGRESS NOTES
Pt requested labs to go to [facility] for upcoming appt.          Pt will have labs drawn prior to 9/13 appt with Dr Wright.     Brigette Bunch, RN  RN Care Coordinator  [phone] clinic main line

## 2023-09-13 ENCOUNTER — APPOINTMENT (OUTPATIENT)
Dept: LAB | Facility: CLINIC | Age: 33
End: 2023-09-13
Attending: INTERNAL MEDICINE
Payer: COMMERCIAL

## 2023-09-13 ENCOUNTER — ONCOLOGY VISIT (OUTPATIENT)
Dept: ONCOLOGY | Facility: CLINIC | Age: 33
End: 2023-09-13
Attending: INTERNAL MEDICINE
Payer: COMMERCIAL

## 2023-09-13 VITALS
OXYGEN SATURATION: 96 % | HEART RATE: 76 BPM | SYSTOLIC BLOOD PRESSURE: 136 MMHG | RESPIRATION RATE: 18 BRPM | TEMPERATURE: 98.3 F | DIASTOLIC BLOOD PRESSURE: 89 MMHG

## 2023-09-13 DIAGNOSIS — D69.3 IDIOPATHIC THROMBOCYTOPENIC PURPURA (H): ICD-10-CM

## 2023-09-13 LAB
BASOPHILS # BLD MANUAL: 0 10E3/UL (ref 0–0.2)
BASOPHILS NFR BLD MANUAL: 1 %
BURR CELLS BLD QL SMEAR: SLIGHT
EOSINOPHIL # BLD MANUAL: 0.2 10E3/UL (ref 0–0.7)
EOSINOPHIL NFR BLD MANUAL: 6 %
ERYTHROCYTE [DISTWIDTH] IN BLOOD BY AUTOMATED COUNT: 13.1 % (ref 10–15)
HCT VFR BLD AUTO: 47.7 % (ref 40–53)
HGB BLD-MCNC: 16.8 G/DL (ref 13.3–17.7)
LYMPHOCYTES # BLD MANUAL: 1.4 10E3/UL (ref 0.8–5.3)
LYMPHOCYTES NFR BLD MANUAL: 46 %
MCH RBC QN AUTO: 29 PG (ref 26.5–33)
MCHC RBC AUTO-ENTMCNC: 35.2 G/DL (ref 31.5–36.5)
MCV RBC AUTO: 82 FL (ref 78–100)
MONOCYTES # BLD MANUAL: 0.3 10E3/UL (ref 0–1.3)
MONOCYTES NFR BLD MANUAL: 10 %
NEUTROPHILS # BLD MANUAL: 1.1 10E3/UL (ref 1.6–8.3)
NEUTROPHILS NFR BLD MANUAL: 37 %
PLAT MORPH BLD: ABNORMAL
PLATELET # BLD AUTO: 36 10E3/UL (ref 150–450)
RBC # BLD AUTO: 5.8 10E6/UL (ref 4.4–5.9)
RBC MORPH BLD: ABNORMAL
WBC # BLD AUTO: 3 10E3/UL (ref 4–11)

## 2023-09-13 PROCEDURE — 99214 OFFICE O/P EST MOD 30 MIN: CPT | Performed by: INTERNAL MEDICINE

## 2023-09-13 PROCEDURE — 85027 COMPLETE CBC AUTOMATED: CPT | Performed by: INTERNAL MEDICINE

## 2023-09-13 PROCEDURE — 99213 OFFICE O/P EST LOW 20 MIN: CPT | Performed by: INTERNAL MEDICINE

## 2023-09-13 PROCEDURE — 85007 BL SMEAR W/DIFF WBC COUNT: CPT | Performed by: INTERNAL MEDICINE

## 2023-09-13 PROCEDURE — 36415 COLL VENOUS BLD VENIPUNCTURE: CPT | Performed by: INTERNAL MEDICINE

## 2023-09-13 NOTE — LETTER
9/13/2023         RE: Roberto Spence  [address]  [facility] [address]        Dear Colleague,    Thank you for referring your patient, Roberto Spence, to the [facility]. Please see a copy of my visit note below.    Hematology Clinic visit  In person     PROBLEM LIST:  1) ITP- presented with platelet count of 1k on 10/20/17. Responsive to prednisone and IvIg. In remission for >2 years, relapsed 5/16/20, and again 10/29/20. Rituximab x4 Dec 2020.  2) leukopenia, thrombocytopenia since at least 2012- Bone marrow bx on 12/5/12 show hypocellular marrow 20-40%, no dysplasia, normal cytogenetics, normal flow cytometry.   2) h/o mild iron deficiency  3) anxiety  4) Celiac disease, diagnosed June 2015      Interim History: Mr. Spence is here for follow-up of ITP.  Last visit was 3/17/22.  His platelet count has been intermittently slightly low, along with intermittently slightly low white count.  The most recent severely low platelet count was 10/30/2020.He has had no significant health issues since I last saw him.     He notes that he has had some increased bruising over the past couple months, especially on forearms.  He wonders if perhaps it is from picking up his toddler.  No epistaxis, gum bleeding, melena, hematochezia, hematuria, or petechiae.    He is working from home, doing some sort of consulting work.      PHYSICAL EXAMINATION:  /89   Pulse 76   Temp 98.3  F (36.8  C) (Oral)   Resp 18   SpO2 96%     General appearance:  Patient is 33 year old man in no acute distress.     HEENT:  No pallor, icterus, or mucositis.    Lymph nodes:  No cervical, supraclavicular, axillary, or inguinal lymphadenopathy.   Lungs:  Clear to auscultation bilaterally.   Heart:  Regular rate and rhythm; no S3 S4 or murmer.     Abdomen:  Positive bowel sounds, soft and nontender, nondistended.  No hepatomegaly. No splenomegaly appreciated.    Extremities:  No joint swelling or tenderness.   No ankle edema.     Skin: A few < 1 cm ecchymoses on both forearms in various stages of healing  No rash, no petechiae or other ecchymoses.    Labs:     Latest Reference Range & Units 03/17/22 08:36 09/13/23 06:54   WBC 4.0 - 11.0 10e3/uL 3.6 (L) 3.0 (L) (P)   Hemoglobin 13.3 - 17.7 g/dL 16.0 16.8 (P)   Hematocrit 40.0 - 53.0 % 46.6 47.7 (P)   Platelet Count 150 - 450 10e3/uL 163 36 (LL) (P)   RBC Count 4.40 - 5.90 10e6/uL 5.57 5.80 (P)   MCV 78 - 100 fL 84 82 (P)   MCH 26.5 - 33.0 pg 28.7 29.0 (P)   MCHC 31.5 - 36.5 g/dL 34.3 35.2 (P)   RDW 10.0 - 15.0 % 12.8 13.1 (P)   % Neutrophils % 46 37   % Lymphocytes % 34 46   % Monocytes % 15 10   % Eosinophils % 4 6   % Basophils % 1 1   Absolute Basophils 0.0 - 0.2 10e3/uL 0.0    Absolute Basophils 0.0 - 0.2 10e3/uL  0.0   Absolute Neutrophil 1.6 - 8.3 10e3/uL  1.1 (L)   Absolute Lymphocytes 0.8 - 5.3 10e3/uL  1.4   Absolute Monocytes 0.0 - 1.3 10e3/uL  0.3   Absolute Eosinophils 0.0 - 0.7 10e3/uL  0.2   Absolute Eosinophils 0.0 - 0.7 10e3/uL 0.1    Absolute Immature Granulocytes <=0.4 10e3/uL 0.0    Absolute Lymphocytes 0.8 - 5.3 10e3/uL 1.2    Absolute Monocytes 0.0 - 1.3 10e3/uL 0.5    % Immature Granulocytes % 0    Absolute Neutrophils 1.6 - 8.3 10e3/uL 1.6    (LL): Data is critically low  (L): Data is abnormally low  (P): Preliminary    Assessment and Recommendation:    # ITP-has had recurrent relapses, and has now relapsed again.  The platelet count of 36 K is adequate to prevent spontaneous bleeding.  There is no need to rush to start treatment as long as the platelet count is greater than 30 K.  He is somewhat reluctant to repeat rituximab at this time, and would prefer to continue to monitor.  We also discussed the possibility of using eltrombopag, but less optimal because he would have to continue on a daily pill which she prefers not to do.  -CBC every 2 weeks x2 months.  -We will give another round of rituximab if the platelet count is consistently less than  30k or  bleeding.    #Mild leukopenia with mild neutropenia-she has not had problems with recurrent infection.  He has had normal B12 and TSH ferritin a few years ago.  -No specific intervention or additional labs at this time.     #  Vaccinations-he should have the influenza and updated COVID-vaccine sometime in late September or early October when they are available.     # Hypertension- adjusting meds through PCP      RTC 12 months with Kyle     Time: I spent a total of 30 minutes on the day of the visit. Please see the note for further information on patient assessment and treatment.      Madelin Wright MD

## 2023-09-13 NOTE — PROGRESS NOTES
Hematology Clinic visit  In person     PROBLEM LIST:  1) ITP- presented with platelet count of 1k on 10/20/17. Responsive to prednisone and IvIg. In remission for >2 years, relapsed 5/16/20, and again 10/29/20. Rituximab x4 Dec 2020.  2) leukopenia, thrombocytopenia since at least 2012- Bone marrow bx on 12/5/12 show hypocellular marrow 20-40%, no dysplasia, normal cytogenetics, normal flow cytometry.   2) h/o mild iron deficiency  3) anxiety  4) Celiac disease, diagnosed June 2015      Interim History: Mr. Spence is here for follow-up of ITP.  Last visit was 3/17/22.  His platelet count has been intermittently slightly low, along with intermittently slightly low white count.  The most recent severely low platelet count was 10/30/2020.He has had no significant health issues since I last saw him.     He notes that he has had some increased bruising over the past couple months, especially on forearms.  He wonders if perhaps it is from picking up his toddler.  No epistaxis, gum bleeding, melena, hematochezia, hematuria, or petechiae.    He is working from home, doing some sort of consulting work.      PHYSICAL EXAMINATION:  /89   Pulse 76   Temp 98.3  F (36.8  C) (Oral)   Resp 18   SpO2 96%     General appearance:  Patient is 33 year old man in no acute distress.     HEENT:  No pallor, icterus, or mucositis.    Lymph nodes:  No cervical, supraclavicular, axillary, or inguinal lymphadenopathy.   Lungs:  Clear to auscultation bilaterally.   Heart:  Regular rate and rhythm; no S3 S4 or murmer.     Abdomen:  Positive bowel sounds, soft and nontender, nondistended.  No hepatomegaly. No splenomegaly appreciated.    Extremities:  No joint swelling or tenderness.  No ankle edema.     Skin: A few < 1 cm ecchymoses on both forearms in various stages of healing  No rash, no petechiae or other ecchymoses.    Labs:     Latest Reference Range & Units 03/17/22 08:36 09/13/23 06:54   WBC 4.0 - 11.0 10e3/uL 3.6 (L) 3.0 (L) (P)    Hemoglobin 13.3 - 17.7 g/dL 16.0 16.8 (P)   Hematocrit 40.0 - 53.0 % 46.6 47.7 (P)   Platelet Count 150 - 450 10e3/uL 163 36 (LL) (P)   RBC Count 4.40 - 5.90 10e6/uL 5.57 5.80 (P)   MCV 78 - 100 fL 84 82 (P)   MCH 26.5 - 33.0 pg 28.7 29.0 (P)   MCHC 31.5 - 36.5 g/dL 34.3 35.2 (P)   RDW 10.0 - 15.0 % 12.8 13.1 (P)   % Neutrophils % 46 37   % Lymphocytes % 34 46   % Monocytes % 15 10   % Eosinophils % 4 6   % Basophils % 1 1   Absolute Basophils 0.0 - 0.2 10e3/uL 0.0    Absolute Basophils 0.0 - 0.2 10e3/uL  0.0   Absolute Neutrophil 1.6 - 8.3 10e3/uL  1.1 (L)   Absolute Lymphocytes 0.8 - 5.3 10e3/uL  1.4   Absolute Monocytes 0.0 - 1.3 10e3/uL  0.3   Absolute Eosinophils 0.0 - 0.7 10e3/uL  0.2   Absolute Eosinophils 0.0 - 0.7 10e3/uL 0.1    Absolute Immature Granulocytes <=0.4 10e3/uL 0.0    Absolute Lymphocytes 0.8 - 5.3 10e3/uL 1.2    Absolute Monocytes 0.0 - 1.3 10e3/uL 0.5    % Immature Granulocytes % 0    Absolute Neutrophils 1.6 - 8.3 10e3/uL 1.6    (LL): Data is critically low  (L): Data is abnormally low  (P): Preliminary    Assessment and Recommendation:    # ITP-has had recurrent relapses, and has now relapsed again.  The platelet count of 36 K is adequate to prevent spontaneous bleeding.  There is no need to rush to start treatment as long as the platelet count is greater than 30 K.  He is somewhat reluctant to repeat rituximab at this time, and would prefer to continue to monitor.  We also discussed the possibility of using eltrombopag, but less optimal because he would have to continue on a daily pill which she prefers not to do.  -CBC every 2 weeks x2 months.  -We will give another round of rituximab if the platelet count is consistently less than 30k or  bleeding.    #Mild leukopenia with mild neutropenia-she has not had problems with recurrent infection.  He has had normal B12 and TSH ferritin a few years ago.  -No specific intervention or additional labs at this time.     #  Vaccinations-he should  have the influenza and updated COVID-vaccine sometime in late September or early October when they are available.     # Hypertension- adjusting meds through PCP      RTC 12 months with Kyle     Time: I spent a total of 30 minutes on the day of the visit. Please see the note for further information on patient assessment and treatment.      Madelin Wright MD

## 2023-09-13 NOTE — NURSING NOTE
Chief Complaint   Patient presents with    Blood Draw     Labs drawn via  by RN. VS taken.     Labs collected from venipuncture by RN. Vitals taken. Checked in for appointment(s).     Reece Valdez RN

## 2023-09-13 NOTE — NURSING NOTE
"Oncology Rooming Note    September 13, 2023 7:03 AM   Roberto Spence is a 33 year old male who presents for:    Chief Complaint   Patient presents with    Blood Draw     Labs drawn via  by RN. VS taken.    Oncology Clinic Visit     Thrombocytopenia      Initial Vitals: /89   Pulse 76   Temp 98.3  F (36.8  C) (Oral)   Resp 18   SpO2 96%  Estimated body mass index is 27.73 kg/m  as calculated from the following:    Height as of 12/2/20: 1.829 m (6' 0.01\").    Weight as of 3/17/22: 92.8 kg (204 lb 8 oz). There is no height or weight on file to calculate BSA.  Data Unavailable Comment: Data Unavailable   No LMP for male patient.  Allergies reviewed: Yes  Medications reviewed: Yes    Medications: Medication refills not needed today.  Pharmacy name entered into [employer]:    [facility] [id number] [place], [place] - [address] [facility] [address] [facility] [address] [place], [place] - [address] [place] [address] [address]  [id number] - [place], [place] - [address] [facility] -- [place], [place] - [address]    Clinical concerns:        Mckayla Díaz              "

## 2023-09-18 ENCOUNTER — VIRTUAL VISIT (OUTPATIENT)
Dept: FAMILY MEDICINE | Facility: CLINIC | Age: 33
End: 2023-09-18
Payer: COMMERCIAL

## 2023-09-18 DIAGNOSIS — Z76.0 ENCOUNTER FOR MEDICATION REFILL: Primary | ICD-10-CM

## 2023-09-18 DIAGNOSIS — I10 BENIGN ESSENTIAL HYPERTENSION: ICD-10-CM

## 2023-09-18 DIAGNOSIS — D69.3 IDIOPATHIC THROMBOCYTOPENIC PURPURA (H): ICD-10-CM

## 2023-09-18 DIAGNOSIS — Z76.89 ESTABLISHING CARE WITH NEW DOCTOR, ENCOUNTER FOR: ICD-10-CM

## 2023-09-18 PROCEDURE — 99203 OFFICE O/P NEW LOW 30 MIN: CPT | Mod: VID | Performed by: INTERNAL MEDICINE

## 2023-09-18 RX ORDER — LOSARTAN POTASSIUM AND HYDROCHLOROTHIAZIDE 12.5; 1 MG/1; MG/1
1 TABLET ORAL DAILY
Qty: 90 TABLET | Refills: 3 | Status: SHIPPED | OUTPATIENT
Start: 2023-09-18

## 2023-09-18 RX ORDER — LOSARTAN POTASSIUM AND HYDROCHLOROTHIAZIDE 12.5; 1 MG/1; MG/1
1 TABLET ORAL DAILY
COMMUNITY
Start: 2023-04-07 | End: 2023-09-18

## 2023-09-18 ASSESSMENT — PATIENT HEALTH QUESTIONNAIRE - PHQ9: SUM OF ALL RESPONSES TO PHQ QUESTIONS 1-9: 0

## 2023-09-18 NOTE — PROGRESS NOTES
Roberto is a 33 year old who is being evaluated via a billable video visit.      How would you like to obtain your AVS? MyChart  If the video visit is dropped, the invitation should be resent by: Text to cell phone: [phone]  Will anyone else be joining your video visit? No      Subjective   Roberto is a 33 year old, presenting for the following health issues:  Hypertension and Establish Care      History of Present Illness       Reason for visit:  Establishing new PCP and refill on blood pressure medication    He eats 0-1 servings of fruits and vegetables daily.He consumes 0 sweetened beverage(s) daily.He exercises with enough effort to increase his heart rate 9 or less minutes per day.  He exercises with enough effort to increase his heart rate 3 or less days per week.   He is taking medications regularly.    Current Medications:     Current Outpatient Medications   Medication Sig Dispense Refill    losartan-hydrochlorothiazide (HYZAAR) 100-12.5 MG tablet Take 1 tablet by mouth daily 90 tablet 3         Allergies:      Allergies   Allergen Reactions    Gluten Meal Unknown    Nsaids Unknown     Has low platelets            Past Medical History:     Past Medical History:   Diagnosis Date    ADHD     Celiac disease 06/10/2015    Depression     Iron deficiency     Platelets decreased (H) 06/10/2015    lower than average per patient report         Past Surgical History:     Past Surgical History:   Procedure Laterality Date    NO HISTORY OF SURGERY           Family Medical History:     Family History   Problem Relation Age of Onset    Coronary Artery Disease Father     Thyroid Disease Father     Known Genetic Syndrome Brother         autisism    Chemical Addiction Paternal Grandfather     Family History Negative Mother     Diabetes No family hx of     Hypertension No family hx of     Hyperlipidemia No family hx of     Cerebrovascular Disease No family hx of     Breast Cancer No family hx of     Colon Cancer No family  hx of     Prostate Cancer No family hx of     Other Cancer No family hx of     Depression No family hx of     Anxiety Disorder No family hx of     Mental Illness No family hx of     Substance Abuse No family hx of     Anesthesia Reaction No family hx of     Asthma No family hx of     Osteoporosis No family hx of     Genetic Disorder No family hx of     Obesity No family hx of     Unknown/Adopted No family hx of          Social History:     Social History     Socioeconomic History    Marital status:      Spouse name: Not on file    Number of children: Not on file    Years of education: Not on file    Highest education level: Not on file   Occupational History    Not on file   Tobacco Use    Smoking status: Never    Smokeless tobacco: Never   Vaping Use    Vaping Use: Never used   Substance and Sexual Activity    Alcohol use: Yes     Alcohol/week: 0.0 standard drinks of alcohol    Drug use: No    Sexual activity: Yes     Partners: Female   Other Topics Concern    Parent/sibling w/ CABG, MI or angioplasty before 65F 55M? No   Social History Narrative    Not on file     Social Determinants of Health     Financial Resource Strain: Not on file   Food Insecurity: Not on file   Transportation Needs: Not on file   Physical Activity: Not on file   Stress: Not on file   Social Connections: Not on file   Intimate Partner Violence: Not on file   Housing Stability: Not on file           Review of System:     Constitutional: Negative for fever or chills  Skin: Negative for rashes  Ears/Nose/Throat: Negative for nasal congestion, sore throat  Respiratory: No shortness of breath, dyspnea on exertion, cough, or hemoptysis  Cardiovascular: Negative for chest pain  Gastrointestinal: Negative for nausea, vomiting  Genitourinary: Negative for dysuria, hematuria  Musculoskeletal: Negative for myalgias  Neurologic: Negative for headaches  Psychiatric: Negative for depression, anxiety  Hematologic/Lymphatic/Immunologic: positive for  ITP  Endocrine: Negative  Behavioral: Negative for tobacco use       Physical Exam:   There were no vitals taken for this visit.    GENERAL: alert and no distress  EYES: eyes grossly normal to inspection, and conjunctivae and sclerae normal  HENT: Normocephalic atraumatic. Nose and mouth without ulcers or lesions  RESP: no cough present   MS: no gross musculoskeletal defects noted  SKIN: no visible suspicious lesions or rashes  NEURO: Alert & Oriented x 3.   PSYCH: mentation appears normal, affect normal        Diagnostic Test Results:     Diagnostic Test Results:  Labs reviewed in Epic    ASSESSMENT/PLAN:       Roberto was seen today for hypertension and establish care.    Diagnoses and all orders for this visit:    Establishing care with new doctor, encounter for  Encounter for medication refill  -     REVIEW OF HEALTH MAINTENANCE PROTOCOL ORDERS    Benign essential hypertension  -     losartan-hydrochlorothiazide (HYZAAR) 100-12.5 MG tablet; Take 1 tablet by mouth daily    Idiopathic thrombocytopenic purpura (H)  - continue follow up with Hematology clinic going forward              Follow Up Plan:     Patient is instructed to return to Internal Medicine clinic for follow-up visit in 1 to 3 months.        Joy Ruby MD  Internal Medicine  [facility]      Video-Visit Details    Type of service:  Video Visit     Originating Location (pt. Location): Home    Distant Location (provider location):  Off-site  Platform used for Video Visit: Sergei

## 2023-09-27 ENCOUNTER — NURSE TRIAGE (OUTPATIENT)
Dept: ONCOLOGY | Facility: CLINIC | Age: 33
End: 2023-09-27

## 2023-09-27 ENCOUNTER — TELEPHONE (OUTPATIENT)
Dept: ONCOLOGY | Facility: CLINIC | Age: 33
End: 2023-09-27

## 2023-09-27 ENCOUNTER — LAB (OUTPATIENT)
Dept: LAB | Facility: CLINIC | Age: 33
End: 2023-09-27
Payer: COMMERCIAL

## 2023-09-27 DIAGNOSIS — D69.6 THROMBOCYTOPENIA (H): ICD-10-CM

## 2023-09-27 DIAGNOSIS — D69.3 IDIOPATHIC THROMBOCYTOPENIC PURPURA (H): Primary | ICD-10-CM

## 2023-09-27 DIAGNOSIS — D69.3 IDIOPATHIC THROMBOCYTOPENIC PURPURA (H): ICD-10-CM

## 2023-09-27 LAB
BASOPHILS # BLD AUTO: 0 10E3/UL (ref 0–0.2)
BASOPHILS NFR BLD AUTO: 0 %
EOSINOPHIL # BLD AUTO: 0.2 10E3/UL (ref 0–0.7)
EOSINOPHIL NFR BLD AUTO: 5 %
ERYTHROCYTE [DISTWIDTH] IN BLOOD BY AUTOMATED COUNT: 12.7 % (ref 10–15)
HCT VFR BLD AUTO: 46.6 % (ref 40–53)
HGB BLD-MCNC: 16.1 G/DL (ref 13.3–17.7)
IMM GRANULOCYTES # BLD: 0 10E3/UL
IMM GRANULOCYTES NFR BLD: 0 %
LYMPHOCYTES # BLD AUTO: 1.5 10E3/UL (ref 0.8–5.3)
LYMPHOCYTES NFR BLD AUTO: 44 %
MCH RBC QN AUTO: 28.9 PG (ref 26.5–33)
MCHC RBC AUTO-ENTMCNC: 34.5 G/DL (ref 31.5–36.5)
MCV RBC AUTO: 84 FL (ref 78–100)
MONOCYTES # BLD AUTO: 0.4 10E3/UL (ref 0–1.3)
MONOCYTES NFR BLD AUTO: 11 %
NEUTROPHILS # BLD AUTO: 1.4 10E3/UL (ref 1.6–8.3)
NEUTROPHILS NFR BLD AUTO: 40 %
NRBC # BLD AUTO: 0 10E3/UL
NRBC BLD AUTO-RTO: 0 /100
PLATELET # BLD AUTO: 18 10E3/UL (ref 150–450)
RBC # BLD AUTO: 5.58 10E6/UL (ref 4.4–5.9)
WBC # BLD AUTO: 3.4 10E3/UL (ref 4–11)

## 2023-09-27 PROCEDURE — 36415 COLL VENOUS BLD VENIPUNCTURE: CPT

## 2023-09-27 PROCEDURE — 85025 COMPLETE CBC W/AUTO DIFF WBC: CPT

## 2023-09-27 RX ORDER — ACETAMINOPHEN 325 MG/1
650 TABLET ORAL ONCE
Status: CANCELLED
Start: 2023-10-11

## 2023-09-27 RX ORDER — ALBUTEROL SULFATE 90 UG/1
1-2 AEROSOL, METERED RESPIRATORY (INHALATION)
Status: CANCELLED
Start: 2023-10-11

## 2023-09-27 RX ORDER — PREDNISONE 10 MG/1
50 TABLET ORAL DAILY
Qty: 60 TABLET | Refills: 1 | Status: SHIPPED | OUTPATIENT
Start: 2023-09-27 | End: 2023-10-10

## 2023-09-27 RX ORDER — HEPARIN SODIUM (PORCINE) LOCK FLUSH IV SOLN 100 UNIT/ML 100 UNIT/ML
5 SOLUTION INTRAVENOUS
Status: CANCELLED | OUTPATIENT
Start: 2023-10-11

## 2023-09-27 RX ORDER — DIPHENHYDRAMINE HCL 25 MG
50 CAPSULE ORAL ONCE
Status: CANCELLED
Start: 2023-10-11

## 2023-09-27 RX ORDER — METHYLPREDNISOLONE SODIUM SUCCINATE 125 MG/2ML
100 INJECTION, POWDER, LYOPHILIZED, FOR SOLUTION INTRAMUSCULAR; INTRAVENOUS ONCE
Status: CANCELLED | OUTPATIENT
Start: 2023-10-11

## 2023-09-27 RX ORDER — DIPHENHYDRAMINE HYDROCHLORIDE 50 MG/ML
50 INJECTION INTRAMUSCULAR; INTRAVENOUS
Status: CANCELLED
Start: 2023-10-11

## 2023-09-27 RX ORDER — MEPERIDINE HYDROCHLORIDE 25 MG/ML
25 INJECTION INTRAMUSCULAR; INTRAVENOUS; SUBCUTANEOUS EVERY 30 MIN PRN
Status: CANCELLED | OUTPATIENT
Start: 2023-10-11

## 2023-09-27 RX ORDER — ALBUTEROL SULFATE 0.83 MG/ML
2.5 SOLUTION RESPIRATORY (INHALATION)
Status: CANCELLED | OUTPATIENT
Start: 2023-10-11

## 2023-09-27 RX ORDER — METHYLPREDNISOLONE SODIUM SUCCINATE 125 MG/2ML
125 INJECTION, POWDER, LYOPHILIZED, FOR SOLUTION INTRAMUSCULAR; INTRAVENOUS
Status: CANCELLED
Start: 2023-10-11

## 2023-09-27 RX ORDER — EPINEPHRINE 1 MG/ML
0.3 INJECTION, SOLUTION INTRAMUSCULAR; SUBCUTANEOUS EVERY 5 MIN PRN
Status: CANCELLED | OUTPATIENT
Start: 2023-10-11

## 2023-09-27 RX ORDER — HEPARIN SODIUM,PORCINE 10 UNIT/ML
5-20 VIAL (ML) INTRAVENOUS DAILY PRN
Status: CANCELLED | OUTPATIENT
Start: 2023-10-11

## 2023-09-27 NOTE — TELEPHONE ENCOUNTER
DATE/TIME OF CALL RECEIVED FROM LAB:  09/27/23 at 7:35 AM   LAB TEST:  Plt 18  Last LAB VALUE: 09/13/23  Plt 36  PROVIDER NOTIFIED?: Yes  PROVIDER NAME:   DATE/TIME LAB VALUE REPORTED TO PROVIDER: 9/27/2023 0776  MECHANISM OF PROVIDER NOTIFICATION: Page  PROVIDER RESPONSE: 2125  acknowledging critical lab. Nothing further needed from triage.  0740 Contacted pt to inform him of lab result from today. Pt denies any bleeding. He reports he has been very diligent about it.   Pt does state he has a cold that he got from his son. Denies SOB/chest pain.    Reviewed information with pt below. Informed pt writer would call him back with 's recommendations.    To reduce risk of bleeding:  Avoid trauma, contact sports and falls.  Avoid sharp objects and tools  Avoid lifting heavy objects  Avoid medications that contain aspirin or ibuprofen  Avoid dental work, floss, toothpicks and water picks  Avoid forceful coughing, sneezing, vomiting and nose blowing  Avoid constipation and enemas: follow bowel regimen as prescribed by provider to avoid straining  Use electrical shaver instead of razor blade  Use nail file instead of nail clippers  Use a soft toothbrush  Use moisturizer on skin    Report the following problems:  Blood in urine, vomit or stool  Prolonged bleeding or bleeding that does not stop  Signs of infection, increased pain, drainage, fever, swelling, pus, streaks or redness    Seek Emergency Care immediately if any of the following problems:  Light headedness  Visual changes  Pale, cold or moist skin  Excessive thirst

## 2023-09-27 NOTE — TELEPHONE ENCOUNTER
I spoke to Roberto by phone.  He is not having any bleeding symptoms.  He has a lingering cough, cold, but overall feels well.  He is understandably somewhat anxious about the platelet count going down further to 18 K.  He has not tolerated dexamethasone in the past, but tolerates prednisone.  He is scheduled for COVID and flu vaccine today and wonders if he should go forward with the.  -I will have him start on prednisone 50 mg daily, prescription sent to [facility] in [facility] in [place].  -I will schedule him for rituximab, the soonest that would start would be 2 weeks from now  -Okay to go forward with the vaccinations, he might develop a large bruise at vaccination site.  He is willing to accept that possibility  -Repeat CBC on Monday 10/2, if platelets improved, I will taper down to 40 mg daily x3 days then 20 mg daily x3 days and hold until starting rituximab he is in agreement with this plan  - If no response to the prednisone by Monday or if is having bleeding symptoms over the weekend, he should receive IVIG.  - If develops bleeding, go to [facility] emergency department    He is in agreement with this plan.  Madelin Wright MD  Hematology

## 2023-10-02 ENCOUNTER — NURSE TRIAGE (OUTPATIENT)
Dept: ONCOLOGY | Facility: CLINIC | Age: 33
End: 2023-10-02

## 2023-10-02 ENCOUNTER — LAB (OUTPATIENT)
Dept: LAB | Facility: CLINIC | Age: 33
End: 2023-10-02
Payer: COMMERCIAL

## 2023-10-02 DIAGNOSIS — D69.3 IDIOPATHIC THROMBOCYTOPENIC PURPURA (H): ICD-10-CM

## 2023-10-02 LAB
BASO+EOS+MONOS # BLD AUTO: ABNORMAL 10*3/UL
BASO+EOS+MONOS NFR BLD AUTO: ABNORMAL %
BASOPHILS # BLD AUTO: 0 10E3/UL (ref 0–0.2)
BASOPHILS NFR BLD AUTO: 1 %
EOSINOPHIL # BLD AUTO: 0.1 10E3/UL (ref 0–0.7)
EOSINOPHIL NFR BLD AUTO: 2 %
ERYTHROCYTE [DISTWIDTH] IN BLOOD BY AUTOMATED COUNT: 12.4 % (ref 10–15)
HCT VFR BLD AUTO: 47.3 % (ref 40–53)
HGB BLD-MCNC: 16.2 G/DL (ref 13.3–17.7)
IMM GRANULOCYTES # BLD: 0 10E3/UL
IMM GRANULOCYTES NFR BLD: 1 %
LYMPHOCYTES # BLD AUTO: 1.3 10E3/UL (ref 0.8–5.3)
LYMPHOCYTES NFR BLD AUTO: 32 %
MCH RBC QN AUTO: 28.3 PG (ref 26.5–33)
MCHC RBC AUTO-ENTMCNC: 34.2 G/DL (ref 31.5–36.5)
MCV RBC AUTO: 83 FL (ref 78–100)
MONOCYTES # BLD AUTO: 0.4 10E3/UL (ref 0–1.3)
MONOCYTES NFR BLD AUTO: 10 %
NEUTROPHILS # BLD AUTO: 2.2 10E3/UL (ref 1.6–8.3)
NEUTROPHILS NFR BLD AUTO: 54 %
NRBC # BLD AUTO: 0 10E3/UL
NRBC BLD AUTO-RTO: 0 /100
PLATELET # BLD AUTO: 41 10E3/UL (ref 150–450)
RBC # BLD AUTO: 5.72 10E6/UL (ref 4.4–5.9)
WBC # BLD AUTO: 4 10E3/UL (ref 4–11)

## 2023-10-02 PROCEDURE — 36415 COLL VENOUS BLD VENIPUNCTURE: CPT

## 2023-10-02 PROCEDURE — 85025 COMPLETE CBC W/AUTO DIFF WBC: CPT

## 2023-10-02 NOTE — TELEPHONE ENCOUNTER
DATE/TIME OF CALL RECEIVED FROM LAB:  10/02/23 at 3:13 PM   LAB TEST/VALUE :  plt 45 (previously on 9/27 plt 18)  PROVIDER NOTIFIED?: Yes  PROVIDER NAME: Dr. Wright  TIME LAB VALUE REPORTED TO PROVIDER: 7416  MECHANISM OF PROVIDER NOTIFICATION: Page  PROVIDER RESPONSE:  8956 return call from Dr. Wright to acknowledge lab, states she will review chart and MyC message pt if there is anything she would want him to do differently.

## 2023-10-03 ENCOUNTER — IMMUNIZATION (OUTPATIENT)
Dept: FAMILY MEDICINE | Facility: CLINIC | Age: 33
End: 2023-10-03
Payer: COMMERCIAL

## 2023-10-03 DIAGNOSIS — Z23 ENCOUNTER FOR IMMUNIZATION: Primary | ICD-10-CM

## 2023-10-03 PROCEDURE — 90686 IIV4 VACC NO PRSV 0.5 ML IM: CPT

## 2023-10-03 PROCEDURE — 99207 PR NO CHARGE NURSE ONLY: CPT

## 2023-10-03 PROCEDURE — 91320 SARSCV2 VAC 30MCG TRS-SUC IM: CPT

## 2023-10-03 PROCEDURE — 90480 ADMN SARSCOV2 VAC 1/ONLY CMP: CPT

## 2023-10-03 PROCEDURE — 90471 IMMUNIZATION ADMIN: CPT

## 2023-10-03 NOTE — RESULT ENCOUNTER NOTE
Kim Mejia,    Here are my comments about the recent results: The platelets are still ow, but improved. Decrease the prednisone down to 40 mg a day (4 tablets). REcheck 10/3 when you come in for the rituximab.    Regards,   Madelin Wright MD

## 2023-10-08 RX ORDER — ALBUTEROL SULFATE 90 UG/1
1-2 AEROSOL, METERED RESPIRATORY (INHALATION)
Status: CANCELLED
Start: 2023-10-15

## 2023-10-08 RX ORDER — ALBUTEROL SULFATE 0.83 MG/ML
2.5 SOLUTION RESPIRATORY (INHALATION)
Status: CANCELLED | OUTPATIENT
Start: 2023-10-15

## 2023-10-08 RX ORDER — HEPARIN SODIUM (PORCINE) LOCK FLUSH IV SOLN 100 UNIT/ML 100 UNIT/ML
5 SOLUTION INTRAVENOUS
Status: CANCELLED | OUTPATIENT
Start: 2023-10-15

## 2023-10-08 RX ORDER — METHYLPREDNISOLONE SODIUM SUCCINATE 125 MG/2ML
125 INJECTION, POWDER, LYOPHILIZED, FOR SOLUTION INTRAMUSCULAR; INTRAVENOUS
Status: CANCELLED
Start: 2023-10-15

## 2023-10-08 RX ORDER — DIPHENHYDRAMINE HYDROCHLORIDE 50 MG/ML
50 INJECTION INTRAMUSCULAR; INTRAVENOUS
Status: CANCELLED
Start: 2023-10-15

## 2023-10-08 RX ORDER — ACETAMINOPHEN 325 MG/1
650 TABLET ORAL ONCE
Status: CANCELLED
Start: 2023-10-15

## 2023-10-08 RX ORDER — DIPHENHYDRAMINE HCL 25 MG
50 CAPSULE ORAL ONCE
Status: CANCELLED
Start: 2023-10-15

## 2023-10-08 RX ORDER — HEPARIN SODIUM,PORCINE 10 UNIT/ML
5-20 VIAL (ML) INTRAVENOUS DAILY PRN
Status: CANCELLED | OUTPATIENT
Start: 2023-10-15

## 2023-10-08 RX ORDER — EPINEPHRINE 1 MG/ML
0.3 INJECTION, SOLUTION INTRAMUSCULAR; SUBCUTANEOUS EVERY 5 MIN PRN
Status: CANCELLED | OUTPATIENT
Start: 2023-10-15

## 2023-10-08 RX ORDER — MEPERIDINE HYDROCHLORIDE 25 MG/ML
25 INJECTION INTRAMUSCULAR; INTRAVENOUS; SUBCUTANEOUS EVERY 30 MIN PRN
Status: CANCELLED | OUTPATIENT
Start: 2023-10-15

## 2023-10-08 RX ORDER — METHYLPREDNISOLONE SODIUM SUCCINATE 125 MG/2ML
100 INJECTION, POWDER, LYOPHILIZED, FOR SOLUTION INTRAMUSCULAR; INTRAVENOUS ONCE
Status: CANCELLED | OUTPATIENT
Start: 2023-10-15

## 2023-10-10 ENCOUNTER — INFUSION THERAPY VISIT (OUTPATIENT)
Dept: ONCOLOGY | Facility: CLINIC | Age: 33
End: 2023-10-10
Payer: COMMERCIAL

## 2023-10-10 ENCOUNTER — APPOINTMENT (OUTPATIENT)
Dept: LAB | Facility: CLINIC | Age: 33
End: 2023-10-10
Payer: COMMERCIAL

## 2023-10-10 VITALS
HEART RATE: 73 BPM | HEIGHT: 71 IN | BODY MASS INDEX: 29.22 KG/M2 | OXYGEN SATURATION: 100 % | DIASTOLIC BLOOD PRESSURE: 92 MMHG | TEMPERATURE: 98.4 F | WEIGHT: 208.7 LBS | RESPIRATION RATE: 16 BRPM | SYSTOLIC BLOOD PRESSURE: 147 MMHG

## 2023-10-10 DIAGNOSIS — D69.3 IDIOPATHIC THROMBOCYTOPENIC PURPURA (H): ICD-10-CM

## 2023-10-10 DIAGNOSIS — D69.6 THROMBOCYTOPENIA (H): ICD-10-CM

## 2023-10-10 DIAGNOSIS — D69.3 IDIOPATHIC THROMBOCYTOPENIC PURPURA (H): Primary | ICD-10-CM

## 2023-10-10 LAB
BASO+EOS+MONOS # BLD AUTO: ABNORMAL 10*3/UL
BASO+EOS+MONOS NFR BLD AUTO: ABNORMAL %
BASOPHILS # BLD AUTO: 0 10E3/UL (ref 0–0.2)
BASOPHILS NFR BLD AUTO: 1 %
EOSINOPHIL # BLD AUTO: 0.1 10E3/UL (ref 0–0.7)
EOSINOPHIL NFR BLD AUTO: 1 %
ERYTHROCYTE [DISTWIDTH] IN BLOOD BY AUTOMATED COUNT: 12.9 % (ref 10–15)
HCT VFR BLD AUTO: 49 % (ref 40–53)
HGB BLD-MCNC: 17.2 G/DL (ref 13.3–17.7)
IMM GRANULOCYTES # BLD: 0 10E3/UL
IMM GRANULOCYTES NFR BLD: 0 %
LYMPHOCYTES # BLD AUTO: 2.2 10E3/UL (ref 0.8–5.3)
LYMPHOCYTES NFR BLD AUTO: 39 %
MCH RBC QN AUTO: 28.8 PG (ref 26.5–33)
MCHC RBC AUTO-ENTMCNC: 35.1 G/DL (ref 31.5–36.5)
MCV RBC AUTO: 82 FL (ref 78–100)
MONOCYTES # BLD AUTO: 0.6 10E3/UL (ref 0–1.3)
MONOCYTES NFR BLD AUTO: 11 %
NEUTROPHILS # BLD AUTO: 2.6 10E3/UL (ref 1.6–8.3)
NEUTROPHILS NFR BLD AUTO: 48 %
NRBC # BLD AUTO: 0 10E3/UL
NRBC BLD AUTO-RTO: 0 /100
PLATELET # BLD AUTO: 61 10E3/UL (ref 150–450)
RBC # BLD AUTO: 5.97 10E6/UL (ref 4.4–5.9)
WBC # BLD AUTO: 5.5 10E3/UL (ref 4–11)

## 2023-10-10 PROCEDURE — 96413 CHEMO IV INFUSION 1 HR: CPT

## 2023-10-10 PROCEDURE — 258N000003 HC RX IP 258 OP 636: Performed by: INTERNAL MEDICINE

## 2023-10-10 PROCEDURE — 85025 COMPLETE CBC W/AUTO DIFF WBC: CPT

## 2023-10-10 PROCEDURE — 96415 CHEMO IV INFUSION ADDL HR: CPT

## 2023-10-10 PROCEDURE — 96376 TX/PRO/DX INJ SAME DRUG ADON: CPT

## 2023-10-10 PROCEDURE — 250N000011 HC RX IP 250 OP 636: Mod: JZ | Performed by: INTERNAL MEDICINE

## 2023-10-10 PROCEDURE — 96375 TX/PRO/DX INJ NEW DRUG ADDON: CPT

## 2023-10-10 PROCEDURE — 250N000013 HC RX MED GY IP 250 OP 250 PS 637: Performed by: INTERNAL MEDICINE

## 2023-10-10 PROCEDURE — 36415 COLL VENOUS BLD VENIPUNCTURE: CPT

## 2023-10-10 RX ORDER — ALBUTEROL SULFATE 0.83 MG/ML
2.5 SOLUTION RESPIRATORY (INHALATION)
Status: DISCONTINUED | OUTPATIENT
Start: 2023-10-10 | End: 2023-10-10 | Stop reason: HOSPADM

## 2023-10-10 RX ORDER — MEPERIDINE HYDROCHLORIDE 25 MG/ML
25 INJECTION INTRAMUSCULAR; INTRAVENOUS; SUBCUTANEOUS EVERY 30 MIN PRN
Status: DISCONTINUED | OUTPATIENT
Start: 2023-10-10 | End: 2023-10-10 | Stop reason: HOSPADM

## 2023-10-10 RX ORDER — ALBUTEROL SULFATE 90 UG/1
1-2 AEROSOL, METERED RESPIRATORY (INHALATION)
Status: DISCONTINUED | OUTPATIENT
Start: 2023-10-10 | End: 2023-10-10 | Stop reason: HOSPADM

## 2023-10-10 RX ORDER — EPINEPHRINE 1 MG/ML
0.3 INJECTION, SOLUTION INTRAMUSCULAR; SUBCUTANEOUS EVERY 5 MIN PRN
Status: DISCONTINUED | OUTPATIENT
Start: 2023-10-10 | End: 2023-10-10 | Stop reason: HOSPADM

## 2023-10-10 RX ORDER — METHYLPREDNISOLONE SODIUM SUCCINATE 125 MG/2ML
100 INJECTION, POWDER, LYOPHILIZED, FOR SOLUTION INTRAMUSCULAR; INTRAVENOUS ONCE
Status: COMPLETED | OUTPATIENT
Start: 2023-10-10 | End: 2023-10-10

## 2023-10-10 RX ORDER — DIPHENHYDRAMINE HYDROCHLORIDE 50 MG/ML
50 INJECTION INTRAMUSCULAR; INTRAVENOUS
Status: COMPLETED | OUTPATIENT
Start: 2023-10-10 | End: 2023-10-10

## 2023-10-10 RX ORDER — METHYLPREDNISOLONE SODIUM SUCCINATE 125 MG/2ML
125 INJECTION, POWDER, LYOPHILIZED, FOR SOLUTION INTRAMUSCULAR; INTRAVENOUS
Status: DISCONTINUED | OUTPATIENT
Start: 2023-10-10 | End: 2023-10-10 | Stop reason: HOSPADM

## 2023-10-10 RX ORDER — DIPHENHYDRAMINE HCL 25 MG
50 CAPSULE ORAL ONCE
Status: COMPLETED | OUTPATIENT
Start: 2023-10-10 | End: 2023-10-10

## 2023-10-10 RX ORDER — PREDNISONE 10 MG/1
10 TABLET ORAL DAILY
Qty: 30 TABLET | Refills: 1 | Status: SHIPPED | OUTPATIENT
Start: 2023-10-10 | End: 2023-12-19

## 2023-10-10 RX ORDER — ACETAMINOPHEN 325 MG/1
650 TABLET ORAL ONCE
Status: COMPLETED | OUTPATIENT
Start: 2023-10-10 | End: 2023-10-10

## 2023-10-10 RX ADMIN — ACETAMINOPHEN 650 MG: 325 TABLET ORAL at 11:26

## 2023-10-10 RX ADMIN — DIPHENHYDRAMINE HYDROCHLORIDE 50 MG: 50 INJECTION INTRAMUSCULAR; INTRAVENOUS at 13:18

## 2023-10-10 RX ADMIN — FAMOTIDINE 20 MG: 10 INJECTION, SOLUTION INTRAVENOUS at 13:21

## 2023-10-10 RX ADMIN — SODIUM CHLORIDE 800 MG: 9 INJECTION, SOLUTION INTRAVENOUS at 12:06

## 2023-10-10 RX ADMIN — SODIUM CHLORIDE 250 ML: 9 INJECTION, SOLUTION INTRAVENOUS at 11:25

## 2023-10-10 RX ADMIN — METHYLPREDNISOLONE SODIUM SUCCINATE 100 MG: 125 INJECTION, POWDER, LYOPHILIZED, FOR SOLUTION INTRAMUSCULAR; INTRAVENOUS at 11:27

## 2023-10-10 RX ADMIN — METHYLPREDNISOLONE SODIUM SUCCINATE 125 MG: 125 INJECTION, POWDER, FOR SOLUTION INTRAMUSCULAR; INTRAVENOUS at 13:19

## 2023-10-10 RX ADMIN — DIPHENHYDRAMINE HYDROCHLORIDE 50 MG: 25 CAPSULE ORAL at 11:26

## 2023-10-10 ASSESSMENT — PAIN SCALES - GENERAL: PAINLEVEL: NO PAIN (0)

## 2023-10-10 NOTE — PROGRESS NOTES
Infusion Nursing Note:  Roberto Spence presents today for Rituximab-pvvr Weekly #1/4.    Patient seen by provider today: No   present during visit today: Not Applicable.    Note: Patient presents to infusion today doing well. Denies any recent fevers, chills, SOB, chest pains or cough. No active bleeding noted. Offers no new changes or concerns today.    Patient has previously received Rituxan back in 2020. Patient has a history of several reactions and tolerated Rituxan without issue administered by 50's most recently. Will plan to do the same today. Per IB from Dr. Wright, no parameters for platelets to proceed.     Patient states he has completed his Prednisone prescription and wondering what the next steps are. Dr. Wright notified.    TORB @ 9343 Dr. Wright/ Faviola Sousa, MARIA ELENA:  - He should take prednisone 10 mg one tablet daily. I have sent a prescription to [facility] in [employer] in [place]     Rituxan administered at the following rates:  50 ml/hr x 30 minutes  100 ml/hr x 30 minutes  150 ml/hr x 30 minutes  200 ml/hr x 30 minutes  250 ml/hr x 30 minutes  300 ml/hr x 30 minutes  350 ml/hr x 30 minutes  400 ml/hr x remainder of infusion    Patient put on call light to report itchy eyes and throat at 150 ml/hr during Rituxan infusion. Denied any SOB or difficulty swallowing. Rituxan stopped. IV benadryl, IV solu-MEDROL and IV pepcid given per hypersensitivity protocol. Vital signs remained stable. Patient reported that itching was resolving. Dr. Wright notified.    TORB @ 4423 Dr. Wright/ Faviola oSusa, RN:  - OK to restart Rituxan at 1/2 rate per protocol once symptoms resolve    Rituxan restarted at 75 ml/hr and increased by 50's once symptoms resolved. Patient tolerated the remainder of infusion well.     Intravenous Access:  Peripheral IV placed.    Treatment Conditions:  Lab Results   Component Value Date    HGB 17.2 10/10/2023    WBC 5.5 10/10/2023    ANEU 1.1 (L) 09/13/2023    ANEUTAUTO 2.6 10/10/2023    PLT  61 (L) 10/10/2023        Results reviewed, labs MET treatment parameters, ok to proceed with treatment.    Post Infusion Assessment:  Patient tolerated infusion poorly due to : Hypersensitivity: Did patient have a hypersensitivity reaction? : Yes  Drug or Product name: Rituximab-pvvr  Were pre-meds administered?: Yes  What pre-meds were administered?: Acetaminophen (Tylenol);Diphenhydramine (Benadryl);Methylprednisolone  First or Subsequent treatment: Subsequent infusion  Rate of infusion when patient had hypersensitivity reaction: 150 ml/hr  Time the hypersensitivity reaction was first recognized: 1317  Symptoms observed or reported (select all that apply): Itching  Interventions/treatment following reaction: Infusion stopped;Hypersensitivity medications administered;Reduced rate of medication administration  What hypersensitivity medications were administered?: DiphendydrAMINE (benadryl);Methylprednisolone;Famotidine(Pepcid)  Name of provider notified: Dr. Wright  Time provider notified: 0914  Type of notification (select all that apply): Paged/Phone  Was the patient re-challenged today?: Yes - tolerated well  Blood return noted pre and post infusion.  Site patent and intact, free from redness, edema or discomfort.  No evidence of extravasations.  Access discontinued per protocol.     Discharge Plan:   Patient declined prescription refills.  Discharge instructions reviewed with: Patient.  Patient and/or family verbalized understanding of discharge instructions and all questions answered.  AVS to patient via [employer].  Patient will return 10/17 for next appointment.   Patient discharged in stable condition accompanied by: self.  Departure Mode: Ambulatory.      Faviola Sousa RN

## 2023-10-10 NOTE — NURSING NOTE
Chief Complaint   Patient presents with    Blood Draw     Vitals, blood drawn and PIV placed by LPN. Pt checked into appt.      ELVA Cintron LPN

## 2023-10-10 NOTE — PATIENT INSTRUCTIONS
Contact Numbers  [facility]: [phone] (for symptom and scheduling needs)    Please call the [facility] Triage line if you experience a temperature greater than or equal to 100.4, shaking chills, have uncontrolled nausea, vomiting and/or diarrhea, dizziness, shortness of breath, chest pain, bleeding, unexplained bruising, or if you have any other new/concerning symptoms, questions or concerns.     If you are having any concerning symptoms or wish to speak to a provider before your next infusion visit, please call your care coordinator or triage to notify them so we can adequately serve you.     If you need a refill on a narcotic prescription or other medication, please call triage before your infusion appointment.         October 2023 Sunday Monday Tuesday Wednesday Thursday Friday Saturday   1     2    MYCHART LAB VISIT   2:30 PM   (15 min.)   CS LAB   [facility] 3    COVID VACCINE PFIZER   8:30 AM   (30 min.)   NE MA/LPN   [facility] 4     5     6     7       8     9     10    LAB PERIPHERAL  10:30 AM   (15 min.)   [facility] DRAW   [facility]    ONC INFUSION 6 HR (360 MIN)  11:00 AM   (360 min.)    ONC INFUSION NURSE   [facility] 11     12     13     14       15     16     17    LAB PERIPHERAL   6:45 AM   (15 min.)   [facility] DRAW   [facility]    ONC INFUSION 6 HR (360 MIN)   7:00 AM   (360 min.)    ONC INFUSION NURSE   [facility] 18     19     20     21       22     23     24    LAB PERIPHERAL   7:00 AM   (15 min.)   [facility]   [facility]    ONC INFUSION 6 HR (360 MIN)   7:30 AM   (360 min.)    ONC INFUSION NURSE   [facility] 25     26     27     28       29     30     31    LAB PERIPHERAL   6:45 AM   (15 min.)    [facility]   [facility]    ONC INFUSION 6 HR (360 MIN)   7:00 AM   (360 min.)    ONC INFUSION NURSE   [facility]                                 November 2023 Sunday Monday Tuesday Wednesday Thursday Friday Saturday                  1     2     3     4       5     6     7     8    LAB   7:00 AM   (20 min.)    LAB ONLY   [facility] 9     10     11       12     13     14     15     16     17     18       19     20     21     22     23     24     25       26     27     28     29     30                               Lab Results:  Recent Results (from the past 12 hour(s))   CBC with platelets and differential    Collection Time: 10/10/23 10:53 AM   Result Value Ref Range    WBC Count 5.5 4.0 - 11.0 10e3/uL    RBC Count 5.97 (H) 4.40 - 5.90 10e6/uL    Hemoglobin 17.2 13.3 - 17.7 g/dL    Hematocrit 49.0 40.0 - 53.0 %    MCV 82 78 - 100 fL    MCH 28.8 26.5 - 33.0 pg    MCHC 35.1 31.5 - 36.5 g/dL    RDW 12.9 10.0 - 15.0 %    Platelet Count 61 (L) 150 - 450 10e3/uL    % Neutrophils 48 %    % Lymphocytes 39 %    % Monocytes 11 %    Mids % (Monos, Eos, Basos)      % Eosinophils 1 %    % Basophils 1 %    % Immature Granulocytes 0 %    NRBCs per 100 WBC 0 <1 /100    Absolute Neutrophils 2.6 1.6 - 8.3 10e3/uL    Absolute Lymphocytes 2.2 0.8 - 5.3 10e3/uL    Absolute Monocytes 0.6 0.0 - 1.3 10e3/uL    Mids Abs (Monos, Eos, Basos)      Absolute Eosinophils 0.1 0.0 - 0.7 10e3/uL    Absolute Basophils 0.0 0.0 - 0.2 10e3/uL    Absolute Immature Granulocytes 0.0 <=0.4 10e3/uL    Absolute NRBCs 0.0 10e3/uL

## 2023-10-15 RX ORDER — ALBUTEROL SULFATE 0.83 MG/ML
2.5 SOLUTION RESPIRATORY (INHALATION)
Status: CANCELLED | OUTPATIENT
Start: 2023-10-17

## 2023-10-15 RX ORDER — METHYLPREDNISOLONE SODIUM SUCCINATE 125 MG/2ML
125 INJECTION, POWDER, LYOPHILIZED, FOR SOLUTION INTRAMUSCULAR; INTRAVENOUS
Status: CANCELLED
Start: 2023-10-17

## 2023-10-15 RX ORDER — EPINEPHRINE 1 MG/ML
0.3 INJECTION, SOLUTION INTRAMUSCULAR; SUBCUTANEOUS EVERY 5 MIN PRN
Status: CANCELLED | OUTPATIENT
Start: 2023-10-17

## 2023-10-15 RX ORDER — METHYLPREDNISOLONE SODIUM SUCCINATE 125 MG/2ML
100 INJECTION, POWDER, LYOPHILIZED, FOR SOLUTION INTRAMUSCULAR; INTRAVENOUS ONCE
Status: CANCELLED | OUTPATIENT
Start: 2023-10-17

## 2023-10-15 RX ORDER — DIPHENHYDRAMINE HCL 25 MG
50 CAPSULE ORAL ONCE
Status: CANCELLED
Start: 2023-10-17

## 2023-10-15 RX ORDER — HEPARIN SODIUM (PORCINE) LOCK FLUSH IV SOLN 100 UNIT/ML 100 UNIT/ML
5 SOLUTION INTRAVENOUS
Status: CANCELLED | OUTPATIENT
Start: 2023-10-17

## 2023-10-15 RX ORDER — DIPHENHYDRAMINE HYDROCHLORIDE 50 MG/ML
50 INJECTION INTRAMUSCULAR; INTRAVENOUS
Status: CANCELLED
Start: 2023-10-17

## 2023-10-15 RX ORDER — HEPARIN SODIUM,PORCINE 10 UNIT/ML
5-20 VIAL (ML) INTRAVENOUS DAILY PRN
Status: CANCELLED | OUTPATIENT
Start: 2023-10-17

## 2023-10-15 RX ORDER — ALBUTEROL SULFATE 90 UG/1
1-2 AEROSOL, METERED RESPIRATORY (INHALATION)
Status: CANCELLED
Start: 2023-10-17

## 2023-10-15 RX ORDER — ACETAMINOPHEN 325 MG/1
650 TABLET ORAL ONCE
Status: CANCELLED
Start: 2023-10-17

## 2023-10-15 RX ORDER — MEPERIDINE HYDROCHLORIDE 25 MG/ML
25 INJECTION INTRAMUSCULAR; INTRAVENOUS; SUBCUTANEOUS EVERY 30 MIN PRN
Status: CANCELLED | OUTPATIENT
Start: 2023-10-17

## 2023-10-16 DIAGNOSIS — D69.3 IDIOPATHIC THROMBOCYTOPENIC PURPURA (H): Primary | ICD-10-CM

## 2023-10-17 ENCOUNTER — APPOINTMENT (OUTPATIENT)
Dept: LAB | Facility: CLINIC | Age: 33
End: 2023-10-17
Payer: COMMERCIAL

## 2023-10-17 ENCOUNTER — INFUSION THERAPY VISIT (OUTPATIENT)
Dept: ONCOLOGY | Facility: CLINIC | Age: 33
End: 2023-10-17
Payer: COMMERCIAL

## 2023-10-17 VITALS
DIASTOLIC BLOOD PRESSURE: 92 MMHG | SYSTOLIC BLOOD PRESSURE: 127 MMHG | TEMPERATURE: 98.3 F | OXYGEN SATURATION: 98 % | WEIGHT: 209 LBS | BODY MASS INDEX: 29.15 KG/M2 | RESPIRATION RATE: 18 BRPM | HEART RATE: 87 BPM

## 2023-10-17 DIAGNOSIS — D69.3 IDIOPATHIC THROMBOCYTOPENIC PURPURA (H): Primary | ICD-10-CM

## 2023-10-17 DIAGNOSIS — D69.6 THROMBOCYTOPENIA (H): ICD-10-CM

## 2023-10-17 LAB
BASO+EOS+MONOS # BLD AUTO: ABNORMAL 10*3/UL
BASO+EOS+MONOS NFR BLD AUTO: ABNORMAL %
BASOPHILS # BLD AUTO: 0 10E3/UL (ref 0–0.2)
BASOPHILS NFR BLD AUTO: 1 %
EOSINOPHIL # BLD AUTO: 0.1 10E3/UL (ref 0–0.7)
EOSINOPHIL NFR BLD AUTO: 2 %
ERYTHROCYTE [DISTWIDTH] IN BLOOD BY AUTOMATED COUNT: 13.1 % (ref 10–15)
HCT VFR BLD AUTO: 47.3 % (ref 40–53)
HGB BLD-MCNC: 16.5 G/DL (ref 13.3–17.7)
IMM GRANULOCYTES # BLD: 0 10E3/UL
IMM GRANULOCYTES NFR BLD: 1 %
LYMPHOCYTES # BLD AUTO: 1.4 10E3/UL (ref 0.8–5.3)
LYMPHOCYTES NFR BLD AUTO: 25 %
MCH RBC QN AUTO: 28.8 PG (ref 26.5–33)
MCHC RBC AUTO-ENTMCNC: 34.9 G/DL (ref 31.5–36.5)
MCV RBC AUTO: 83 FL (ref 78–100)
MONOCYTES # BLD AUTO: 0.6 10E3/UL (ref 0–1.3)
MONOCYTES NFR BLD AUTO: 10 %
NEUTROPHILS # BLD AUTO: 3.4 10E3/UL (ref 1.6–8.3)
NEUTROPHILS NFR BLD AUTO: 61 %
NRBC # BLD AUTO: 0 10E3/UL
NRBC BLD AUTO-RTO: 0 /100
PLATELET # BLD AUTO: 87 10E3/UL (ref 150–450)
RBC # BLD AUTO: 5.73 10E6/UL (ref 4.4–5.9)
WBC # BLD AUTO: 5.6 10E3/UL (ref 4–11)

## 2023-10-17 PROCEDURE — 96415 CHEMO IV INFUSION ADDL HR: CPT

## 2023-10-17 PROCEDURE — 250N000013 HC RX MED GY IP 250 OP 250 PS 637: Performed by: INTERNAL MEDICINE

## 2023-10-17 PROCEDURE — 36415 COLL VENOUS BLD VENIPUNCTURE: CPT

## 2023-10-17 PROCEDURE — 96375 TX/PRO/DX INJ NEW DRUG ADDON: CPT

## 2023-10-17 PROCEDURE — 85025 COMPLETE CBC W/AUTO DIFF WBC: CPT

## 2023-10-17 PROCEDURE — 258N000003 HC RX IP 258 OP 636: Performed by: INTERNAL MEDICINE

## 2023-10-17 PROCEDURE — 96413 CHEMO IV INFUSION 1 HR: CPT

## 2023-10-17 PROCEDURE — 250N000011 HC RX IP 250 OP 636: Mod: JZ | Performed by: INTERNAL MEDICINE

## 2023-10-17 RX ORDER — ACETAMINOPHEN 325 MG/1
650 TABLET ORAL ONCE
Status: COMPLETED | OUTPATIENT
Start: 2023-10-17 | End: 2023-10-17

## 2023-10-17 RX ORDER — METHYLPREDNISOLONE SODIUM SUCCINATE 125 MG/2ML
100 INJECTION, POWDER, LYOPHILIZED, FOR SOLUTION INTRAMUSCULAR; INTRAVENOUS ONCE
Status: COMPLETED | OUTPATIENT
Start: 2023-10-17 | End: 2023-10-17

## 2023-10-17 RX ORDER — DIPHENHYDRAMINE HCL 25 MG
50 CAPSULE ORAL ONCE
Status: COMPLETED | OUTPATIENT
Start: 2023-10-17 | End: 2023-10-17

## 2023-10-17 RX ADMIN — ACETAMINOPHEN 650 MG: 325 TABLET ORAL at 07:48

## 2023-10-17 RX ADMIN — DIPHENHYDRAMINE HYDROCHLORIDE 50 MG: 25 CAPSULE ORAL at 07:48

## 2023-10-17 RX ADMIN — SODIUM CHLORIDE 800 MG: 9 INJECTION, SOLUTION INTRAVENOUS at 08:31

## 2023-10-17 RX ADMIN — SODIUM CHLORIDE 250 ML: 9 INJECTION, SOLUTION INTRAVENOUS at 07:47

## 2023-10-17 RX ADMIN — METHYLPREDNISOLONE SODIUM SUCCINATE 100 MG: 125 INJECTION, POWDER, LYOPHILIZED, FOR SOLUTION INTRAMUSCULAR; INTRAVENOUS at 07:48

## 2023-10-17 ASSESSMENT — PAIN SCALES - GENERAL: PAINLEVEL: NO PAIN (0)

## 2023-10-17 NOTE — NURSING NOTE
Chief Complaint   Patient presents with    Blood Draw     Labs drawn via PIV placed by RN. VS taken.     Labs drawn from PIV placed by RN. Line flushed with saline. Vitals taken. Pt checked in for appointment(s).     Reece Valdez RN

## 2023-10-17 NOTE — PATIENT INSTRUCTIONS
[facility] Triage and after hours / weekends / holidays:  [phone]    Please call the triage or after hours line if you experience a temperature greater than or equal to 100.4, shaking chills, have uncontrolled nausea, vomiting and/or diarrhea, dizziness, shortness of breath, chest pain, bleeding, unexplained bruising, or if you have any other new/concerning symptoms, questions or concerns.      If you are having any concerning symptoms or wish to speak to a provider before your next infusion visit, please call triage to notify them so we can adequately serve you.     If you need a refill on a narcotic prescription or other medication, please call before your infusion appointment.       October 2023 Sunday Monday Tuesday Wednesday Thursday Friday Saturday   1     2    MYCHART LAB VISIT   2:30 PM   (15 min.)   CS LAB   [facility] 3    COVID VACCINE PFIZER   8:30 AM   (30 min.)   NE MA/LPN   [facility] 4     5     6     7       8     9     10    LAB PERIPHERAL  10:30 AM   (15 min.)   [facility]   [facility]    ONC INFUSION 6 HR (360 MIN)  11:00 AM   (360 min.)    ONC INFUSION NURSE   [facility] 11     12     13     14       15     16     17    LAB PERIPHERAL   6:45 AM   (15 min.)   [facility]   [facility]    ONC INFUSION 6 HR (360 MIN)   7:00 AM   (360 min.)    ONC INFUSION NURSE   [facility] 18     19     20     21       22     23     24    LAB PERIPHERAL   7:00 AM   (15 min.)   [facility]   [facility]    ONC INFUSION 6 HR (360 MIN)   7:30 AM   (360 min.)    ONC INFUSION NURSE   [facility] 25     26     27     28       29     30     31    LAB PERIPHERAL   6:45 AM   (15 min.)   [facility]   [facility]    ONC  INFUSION 6 HR (360 MIN)   7:00 AM   (360 min.)   [facility] ONC INFUSION NURSE   [facility]                                 November 2023 Sunday Monday Tuesday Wednesday Thursday Friday Saturday                  1     2     3     4       5     6     7     8    LAB   7:00 AM   (20 min.)    LAB ONLY   [facility] 9     10     11       12     13     14     15     16     17     18       19     20     21     22     23     24     25       26     27     28     29     30                               Lab Results:  Recent Results (from the past 12 hour(s))   CBC with platelets and differential    Collection Time: 10/17/23  7:08 AM   Result Value Ref Range    WBC Count 5.6 4.0 - 11.0 10e3/uL    RBC Count 5.73 4.40 - 5.90 10e6/uL    Hemoglobin 16.5 13.3 - 17.7 g/dL    Hematocrit 47.3 40.0 - 53.0 %    MCV 83 78 - 100 fL    MCH 28.8 26.5 - 33.0 pg    MCHC 34.9 31.5 - 36.5 g/dL    RDW 13.1 10.0 - 15.0 %    Platelet Count 87 (L) 150 - 450 10e3/uL    % Neutrophils 61 %    % Lymphocytes 25 %    % Monocytes 10 %    Mids % (Monos, Eos, Basos)      % Eosinophils 2 %    % Basophils 1 %    % Immature Granulocytes 1 %    NRBCs per 100 WBC 0 <1 /100    Absolute Neutrophils 3.4 1.6 - 8.3 10e3/uL    Absolute Lymphocytes 1.4 0.8 - 5.3 10e3/uL    Absolute Monocytes 0.6 0.0 - 1.3 10e3/uL    Mids Abs (Monos, Eos, Basos)      Absolute Eosinophils 0.1 0.0 - 0.7 10e3/uL    Absolute Basophils 0.0 0.0 - 0.2 10e3/uL    Absolute Immature Granulocytes 0.0 <=0.4 10e3/uL    Absolute NRBCs 0.0 10e3/uL

## 2023-10-17 NOTE — PROGRESS NOTES
Infusion Nursing Note:  Roberto Spence presents today for #2 of 4 Rituximab-pvvr (Ruxience).    Patient seen by provider today: No   present during visit today: Not Applicable.    Note: Pt presents to infusion feeling well. He reports one episode of mild nausea yesterday, but contributes this to something he may have ate with gluten. It resolved on its own and has not returned. He denies fevers/chills, cough/congestion, SOB, chest pain, vomiting, robb or unusual bleeding, or further concerns today. He confirms he is taking his Prednisone as prescribed.    Pt reacted to his first dose last week. Rituxan infused at the following rates today:  50 ml/hr x 30 minutes  100 ml/hr x 30 minutes  150 ml/hr x 30 minutes  200 ml/hr x 30 minutes  250 ml/hr x 30 minutes  300 ml/hr x 30 minutes  350 ml/hr x 30 minutes  400 ml/hr x remainder of infusion    Intravenous Access:  Peripheral IV placed.    Treatment Conditions:  Lab Results   Component Value Date    HGB 16.5 10/17/2023    WBC 5.6 10/17/2023    ANEU 1.1 (L) 09/13/2023    ANEUTAUTO 3.4 10/17/2023    PLT 87 (L) 10/17/2023       Post Infusion Assessment:  Patient tolerated infusion without incident.  Blood return noted pre and post infusion.  Site patent and intact, free from redness, edema or discomfort.  No evidence of extravasations.  Access discontinued per protocol.       Discharge Plan:   Patient declined prescription refills.  Discharge instructions reviewed with: Patient.  Patient and/or family verbalized understanding of discharge instructions and all questions answered.  AVS to patient via [employer]. Patient will return 10/24/23 for next appointment.   Patient discharged in stable condition accompanied by: self.  Departure Mode: Ambulatory.      Torrie An RN

## 2023-10-23 RX ORDER — METHYLPREDNISOLONE SODIUM SUCCINATE 125 MG/2ML
100 INJECTION, POWDER, LYOPHILIZED, FOR SOLUTION INTRAMUSCULAR; INTRAVENOUS ONCE
Status: CANCELLED | OUTPATIENT
Start: 2023-10-24

## 2023-10-23 RX ORDER — HEPARIN SODIUM,PORCINE 10 UNIT/ML
5-20 VIAL (ML) INTRAVENOUS DAILY PRN
Status: CANCELLED | OUTPATIENT
Start: 2023-10-24

## 2023-10-23 RX ORDER — ACETAMINOPHEN 325 MG/1
650 TABLET ORAL ONCE
Status: CANCELLED
Start: 2023-10-24

## 2023-10-23 RX ORDER — ALBUTEROL SULFATE 90 UG/1
1-2 AEROSOL, METERED RESPIRATORY (INHALATION)
Status: CANCELLED
Start: 2023-10-24

## 2023-10-23 RX ORDER — DIPHENHYDRAMINE HCL 25 MG
50 CAPSULE ORAL ONCE
Status: CANCELLED
Start: 2023-10-24

## 2023-10-23 RX ORDER — MEPERIDINE HYDROCHLORIDE 25 MG/ML
25 INJECTION INTRAMUSCULAR; INTRAVENOUS; SUBCUTANEOUS EVERY 30 MIN PRN
Status: CANCELLED | OUTPATIENT
Start: 2023-10-24

## 2023-10-23 RX ORDER — EPINEPHRINE 1 MG/ML
0.3 INJECTION, SOLUTION INTRAMUSCULAR; SUBCUTANEOUS EVERY 5 MIN PRN
Status: CANCELLED | OUTPATIENT
Start: 2023-10-24

## 2023-10-23 RX ORDER — DIPHENHYDRAMINE HYDROCHLORIDE 50 MG/ML
50 INJECTION INTRAMUSCULAR; INTRAVENOUS
Status: CANCELLED
Start: 2023-10-24

## 2023-10-23 RX ORDER — HEPARIN SODIUM (PORCINE) LOCK FLUSH IV SOLN 100 UNIT/ML 100 UNIT/ML
5 SOLUTION INTRAVENOUS
Status: CANCELLED | OUTPATIENT
Start: 2023-10-24

## 2023-10-23 RX ORDER — ALBUTEROL SULFATE 0.83 MG/ML
2.5 SOLUTION RESPIRATORY (INHALATION)
Status: CANCELLED | OUTPATIENT
Start: 2023-10-24

## 2023-10-23 RX ORDER — METHYLPREDNISOLONE SODIUM SUCCINATE 125 MG/2ML
125 INJECTION, POWDER, LYOPHILIZED, FOR SOLUTION INTRAMUSCULAR; INTRAVENOUS
Status: CANCELLED
Start: 2023-10-24

## 2023-10-24 ENCOUNTER — APPOINTMENT (OUTPATIENT)
Dept: LAB | Facility: CLINIC | Age: 33
End: 2023-10-24
Payer: COMMERCIAL

## 2023-10-24 ENCOUNTER — INFUSION THERAPY VISIT (OUTPATIENT)
Dept: ONCOLOGY | Facility: CLINIC | Age: 33
End: 2023-10-24
Payer: COMMERCIAL

## 2023-10-24 VITALS
WEIGHT: 209.9 LBS | SYSTOLIC BLOOD PRESSURE: 148 MMHG | RESPIRATION RATE: 16 BRPM | BODY MASS INDEX: 29.28 KG/M2 | HEART RATE: 69 BPM | TEMPERATURE: 98 F | DIASTOLIC BLOOD PRESSURE: 96 MMHG | OXYGEN SATURATION: 95 %

## 2023-10-24 DIAGNOSIS — D69.3 IDIOPATHIC THROMBOCYTOPENIC PURPURA (H): Primary | ICD-10-CM

## 2023-10-24 DIAGNOSIS — D69.6 THROMBOCYTOPENIA (H): ICD-10-CM

## 2023-10-24 LAB
BASOPHILS # BLD AUTO: 0 10E3/UL (ref 0–0.2)
BASOPHILS NFR BLD AUTO: 1 %
EOSINOPHIL # BLD AUTO: 0.1 10E3/UL (ref 0–0.7)
EOSINOPHIL NFR BLD AUTO: 2 %
ERYTHROCYTE [DISTWIDTH] IN BLOOD BY AUTOMATED COUNT: 13.2 % (ref 10–15)
HCT VFR BLD AUTO: 46.4 % (ref 40–53)
HGB BLD-MCNC: 15.9 G/DL (ref 13.3–17.7)
IMM GRANULOCYTES # BLD: 0 10E3/UL
IMM GRANULOCYTES NFR BLD: 1 %
LYMPHOCYTES # BLD AUTO: 1.6 10E3/UL (ref 0.8–5.3)
LYMPHOCYTES NFR BLD AUTO: 34 %
MCH RBC QN AUTO: 28.3 PG (ref 26.5–33)
MCHC RBC AUTO-ENTMCNC: 34.3 G/DL (ref 31.5–36.5)
MCV RBC AUTO: 83 FL (ref 78–100)
MONOCYTES # BLD AUTO: 0.5 10E3/UL (ref 0–1.3)
MONOCYTES NFR BLD AUTO: 10 %
NEUTROPHILS # BLD AUTO: 2.4 10E3/UL (ref 1.6–8.3)
NEUTROPHILS NFR BLD AUTO: 52 %
NRBC # BLD AUTO: 0 10E3/UL
NRBC BLD AUTO-RTO: 0 /100
PLATELET # BLD AUTO: 149 10E3/UL (ref 150–450)
RBC # BLD AUTO: 5.61 10E6/UL (ref 4.4–5.9)
WBC # BLD AUTO: 4.7 10E3/UL (ref 4–11)

## 2023-10-24 PROCEDURE — 258N000003 HC RX IP 258 OP 636: Performed by: INTERNAL MEDICINE

## 2023-10-24 PROCEDURE — 85025 COMPLETE CBC W/AUTO DIFF WBC: CPT

## 2023-10-24 PROCEDURE — 96415 CHEMO IV INFUSION ADDL HR: CPT

## 2023-10-24 PROCEDURE — 250N000011 HC RX IP 250 OP 636: Performed by: INTERNAL MEDICINE

## 2023-10-24 PROCEDURE — 96413 CHEMO IV INFUSION 1 HR: CPT

## 2023-10-24 PROCEDURE — 250N000013 HC RX MED GY IP 250 OP 250 PS 637: Performed by: INTERNAL MEDICINE

## 2023-10-24 PROCEDURE — 36415 COLL VENOUS BLD VENIPUNCTURE: CPT

## 2023-10-24 PROCEDURE — 96375 TX/PRO/DX INJ NEW DRUG ADDON: CPT

## 2023-10-24 RX ORDER — ACETAMINOPHEN 325 MG/1
650 TABLET ORAL ONCE
Status: COMPLETED | OUTPATIENT
Start: 2023-10-24 | End: 2023-10-24

## 2023-10-24 RX ORDER — DIPHENHYDRAMINE HCL 25 MG
50 CAPSULE ORAL ONCE
Status: COMPLETED | OUTPATIENT
Start: 2023-10-24 | End: 2023-10-24

## 2023-10-24 RX ORDER — METHYLPREDNISOLONE SODIUM SUCCINATE 125 MG/2ML
100 INJECTION, POWDER, LYOPHILIZED, FOR SOLUTION INTRAMUSCULAR; INTRAVENOUS ONCE
Status: COMPLETED | OUTPATIENT
Start: 2023-10-24 | End: 2023-10-24

## 2023-10-24 RX ADMIN — SODIUM CHLORIDE 250 ML: 9 INJECTION, SOLUTION INTRAVENOUS at 07:33

## 2023-10-24 RX ADMIN — SODIUM CHLORIDE 800 MG: 9 INJECTION, SOLUTION INTRAVENOUS at 08:12

## 2023-10-24 RX ADMIN — ACETAMINOPHEN 650 MG: 325 TABLET ORAL at 07:26

## 2023-10-24 RX ADMIN — DIPHENHYDRAMINE HYDROCHLORIDE 50 MG: 25 CAPSULE ORAL at 07:26

## 2023-10-24 RX ADMIN — METHYLPREDNISOLONE SODIUM SUCCINATE 100 MG: 125 INJECTION, POWDER, FOR SOLUTION INTRAMUSCULAR; INTRAVENOUS at 07:26

## 2023-10-24 ASSESSMENT — PAIN SCALES - GENERAL: PAINLEVEL: NO PAIN (0)

## 2023-10-24 NOTE — PROGRESS NOTES
Infusion Nursing Note:  Roberto Spence presents today for Rituximab-pvvr (#3 of 4).    Patient seen by provider today: No   present during visit today: Not Applicable.    Note: Roberto presents to infusion today feeling well. He denies any pain, infectious symptoms, or other concerns.     Patient would be eligible for titrating Rituxan by 100 ml/hr every 30 minutes today, but due to previous reactions at these rates, patient would prefer to stay with the slower rates, titrating up by 50 ml/hr every 30 minutes.    Intravenous Access:  Peripheral IV placed.    Treatment Conditions:  Lab Results   Component Value Date    HGB 15.9 10/24/2023    WBC 4.7 10/24/2023    ANEU 1.1 (L) 09/13/2023    ANEUTAUTO 2.4 10/24/2023     (L) 10/24/2023     Results reviewed, labs MET treatment parameters, ok to proceed with treatment.    Post Infusion Assessment:  Patient tolerated infusion without incident.  Blood return noted pre and post infusion.  Site patent and intact, free from redness, edema or discomfort.  No evidence of extravasations.  Access discontinued per protocol.     Discharge Plan:   Patient declined prescription refills.  Discharge instructions reviewed with: Patient.  Patient and/or family verbalized understanding of discharge instructions and all questions answered.  AVS to patient via [employer].  Patient will return 10/31 for next appointment.   Patient discharged in stable condition accompanied by: self.  Departure Mode: Ambulatory.      Estee Rg RN

## 2023-10-29 RX ORDER — MEPERIDINE HYDROCHLORIDE 25 MG/ML
25 INJECTION INTRAMUSCULAR; INTRAVENOUS; SUBCUTANEOUS EVERY 30 MIN PRN
OUTPATIENT
Start: 2023-10-31

## 2023-10-29 RX ORDER — ALBUTEROL SULFATE 0.83 MG/ML
2.5 SOLUTION RESPIRATORY (INHALATION)
OUTPATIENT
Start: 2023-10-31

## 2023-10-29 RX ORDER — ALBUTEROL SULFATE 90 UG/1
1-2 AEROSOL, METERED RESPIRATORY (INHALATION)
Start: 2023-10-31

## 2023-10-29 RX ORDER — METHYLPREDNISOLONE SODIUM SUCCINATE 125 MG/2ML
100 INJECTION, POWDER, LYOPHILIZED, FOR SOLUTION INTRAMUSCULAR; INTRAVENOUS ONCE
OUTPATIENT
Start: 2023-10-31

## 2023-10-29 RX ORDER — HEPARIN SODIUM (PORCINE) LOCK FLUSH IV SOLN 100 UNIT/ML 100 UNIT/ML
5 SOLUTION INTRAVENOUS
OUTPATIENT
Start: 2023-10-31

## 2023-10-29 RX ORDER — DIPHENHYDRAMINE HCL 25 MG
50 CAPSULE ORAL ONCE
Start: 2023-10-31

## 2023-10-29 RX ORDER — DIPHENHYDRAMINE HYDROCHLORIDE 50 MG/ML
50 INJECTION INTRAMUSCULAR; INTRAVENOUS
Start: 2023-10-31

## 2023-10-29 RX ORDER — EPINEPHRINE 1 MG/ML
0.3 INJECTION, SOLUTION INTRAMUSCULAR; SUBCUTANEOUS EVERY 5 MIN PRN
OUTPATIENT
Start: 2023-10-31

## 2023-10-29 RX ORDER — HEPARIN SODIUM,PORCINE 10 UNIT/ML
5-20 VIAL (ML) INTRAVENOUS DAILY PRN
OUTPATIENT
Start: 2023-10-31

## 2023-10-29 RX ORDER — METHYLPREDNISOLONE SODIUM SUCCINATE 125 MG/2ML
125 INJECTION, POWDER, LYOPHILIZED, FOR SOLUTION INTRAMUSCULAR; INTRAVENOUS
Start: 2023-10-31

## 2023-10-29 RX ORDER — ACETAMINOPHEN 325 MG/1
650 TABLET ORAL ONCE
Start: 2023-10-31

## 2023-10-31 ENCOUNTER — APPOINTMENT (OUTPATIENT)
Dept: LAB | Facility: CLINIC | Age: 33
End: 2023-10-31
Payer: COMMERCIAL

## 2023-10-31 ENCOUNTER — INFUSION THERAPY VISIT (OUTPATIENT)
Dept: ONCOLOGY | Facility: CLINIC | Age: 33
End: 2023-10-31
Payer: COMMERCIAL

## 2023-10-31 VITALS
WEIGHT: 207.7 LBS | RESPIRATION RATE: 18 BRPM | TEMPERATURE: 97.9 F | DIASTOLIC BLOOD PRESSURE: 97 MMHG | BODY MASS INDEX: 28.97 KG/M2 | OXYGEN SATURATION: 98 % | HEART RATE: 87 BPM | SYSTOLIC BLOOD PRESSURE: 144 MMHG

## 2023-10-31 DIAGNOSIS — D69.3 IDIOPATHIC THROMBOCYTOPENIC PURPURA (H): Primary | ICD-10-CM

## 2023-10-31 DIAGNOSIS — D69.6 THROMBOCYTOPENIA (H): ICD-10-CM

## 2023-10-31 LAB
BASOPHILS # BLD AUTO: 0 10E3/UL (ref 0–0.2)
BASOPHILS NFR BLD AUTO: 1 %
EOSINOPHIL # BLD AUTO: 0.1 10E3/UL (ref 0–0.7)
EOSINOPHIL NFR BLD AUTO: 3 %
ERYTHROCYTE [DISTWIDTH] IN BLOOD BY AUTOMATED COUNT: 13.3 % (ref 10–15)
HCT VFR BLD AUTO: 50.8 % (ref 40–53)
HGB BLD-MCNC: 17.2 G/DL (ref 13.3–17.7)
IMM GRANULOCYTES # BLD: 0 10E3/UL
IMM GRANULOCYTES NFR BLD: 0 %
LYMPHOCYTES # BLD AUTO: 1.5 10E3/UL (ref 0.8–5.3)
LYMPHOCYTES NFR BLD AUTO: 31 %
MCH RBC QN AUTO: 28.2 PG (ref 26.5–33)
MCHC RBC AUTO-ENTMCNC: 33.9 G/DL (ref 31.5–36.5)
MCV RBC AUTO: 83 FL (ref 78–100)
MONOCYTES # BLD AUTO: 0.5 10E3/UL (ref 0–1.3)
MONOCYTES NFR BLD AUTO: 11 %
NEUTROPHILS # BLD AUTO: 2.5 10E3/UL (ref 1.6–8.3)
NEUTROPHILS NFR BLD AUTO: 54 %
NRBC # BLD AUTO: 0 10E3/UL
NRBC BLD AUTO-RTO: 0 /100
PLATELET # BLD AUTO: 154 10E3/UL (ref 150–450)
RBC # BLD AUTO: 6.1 10E6/UL (ref 4.4–5.9)
WBC # BLD AUTO: 4.7 10E3/UL (ref 4–11)

## 2023-10-31 PROCEDURE — 250N000011 HC RX IP 250 OP 636: Mod: JZ | Performed by: INTERNAL MEDICINE

## 2023-10-31 PROCEDURE — 85025 COMPLETE CBC W/AUTO DIFF WBC: CPT

## 2023-10-31 PROCEDURE — 36415 COLL VENOUS BLD VENIPUNCTURE: CPT

## 2023-10-31 PROCEDURE — 258N000003 HC RX IP 258 OP 636: Performed by: INTERNAL MEDICINE

## 2023-10-31 PROCEDURE — 96415 CHEMO IV INFUSION ADDL HR: CPT

## 2023-10-31 PROCEDURE — 96375 TX/PRO/DX INJ NEW DRUG ADDON: CPT

## 2023-10-31 PROCEDURE — 96413 CHEMO IV INFUSION 1 HR: CPT

## 2023-10-31 PROCEDURE — 250N000013 HC RX MED GY IP 250 OP 250 PS 637: Performed by: INTERNAL MEDICINE

## 2023-10-31 RX ORDER — METHYLPREDNISOLONE SODIUM SUCCINATE 125 MG/2ML
100 INJECTION, POWDER, LYOPHILIZED, FOR SOLUTION INTRAMUSCULAR; INTRAVENOUS ONCE
Status: COMPLETED | OUTPATIENT
Start: 2023-10-31 | End: 2023-10-31

## 2023-10-31 RX ORDER — DIPHENHYDRAMINE HCL 25 MG
50 CAPSULE ORAL ONCE
Status: COMPLETED | OUTPATIENT
Start: 2023-10-31 | End: 2023-10-31

## 2023-10-31 RX ORDER — ACETAMINOPHEN 325 MG/1
650 TABLET ORAL ONCE
Status: COMPLETED | OUTPATIENT
Start: 2023-10-31 | End: 2023-10-31

## 2023-10-31 RX ADMIN — METHYLPREDNISOLONE SODIUM SUCCINATE 100 MG: 125 INJECTION, POWDER, FOR SOLUTION INTRAMUSCULAR; INTRAVENOUS at 07:54

## 2023-10-31 RX ADMIN — ACETAMINOPHEN 650 MG: 325 TABLET ORAL at 07:53

## 2023-10-31 RX ADMIN — SODIUM CHLORIDE 800 MG: 9 INJECTION, SOLUTION INTRAVENOUS at 08:25

## 2023-10-31 RX ADMIN — SODIUM CHLORIDE 250 ML: 9 INJECTION, SOLUTION INTRAVENOUS at 07:51

## 2023-10-31 RX ADMIN — DIPHENHYDRAMINE HYDROCHLORIDE 50 MG: 25 CAPSULE ORAL at 07:53

## 2023-10-31 ASSESSMENT — PAIN SCALES - GENERAL: PAINLEVEL: NO PAIN (0)

## 2023-10-31 NOTE — NURSING NOTE
Chief Complaint   Patient presents with    Blood Draw     Labs drawn with PIV start by RN. Vitals taken.     Labs drawn with PIV start by RN. Pt tolerated well. Vitals taken. Pt checked into next appointment.    Francie Powell RN

## 2023-10-31 NOTE — PROGRESS NOTES
"Infusion Nursing Note:  Roberto Spence presents today for Week 4/4 Rituxan.    Patient spoke with provider today: No    Treatment Conditions:  Lab Results   Component Value Date    HGB 17.2 10/31/2023    WBC 4.7 10/31/2023    ANEU 1.1 (L) 09/13/2023    ANEUTAUTO 2.5 10/31/2023     10/31/2023        Results reviewed, labs MET treatment parameters, ok to proceed with treatment.      Note: Denies fever/chills, SOB. States, \"I might be getting a cold from my wife and toddler.\" Reports feeling \"a little more tired and an occasional cough.\" Not coughing anything up. Pt states, \"the cough is so infrequent that I don't really notice it.\"    Patient would be eligible for titrating Rituxan by 100 ml/hr every 30 minutes today, but due to previous reactions at these rates, patient would prefer to stay with the slower rates, titrating up by 50 ml/hr every 30 minutes.     Tolerated infusion without incident.     Intravenous Access:  Peripheral IV placed.    Post Infusion Assessment:  Blood return noted pre and post infusion.  Access discontinued per protocol.    Discharge Plan:   Patient declined prescription refills.  Discharge instructions reviewed with: Patient.  Patient and/or family verbalized understanding of discharge instructions and all questions answered.  AVS to patient via [employer].  Patient will return 11/8/23 for lab appointment.   Patient discharged in stable condition accompanied by: self.  Departure Mode: Ambulatory.    Ina Guerrero RN   "

## 2023-11-08 ENCOUNTER — LAB (OUTPATIENT)
Dept: LAB | Facility: CLINIC | Age: 33
End: 2023-11-08
Payer: COMMERCIAL

## 2023-11-08 DIAGNOSIS — D69.3 IDIOPATHIC THROMBOCYTOPENIC PURPURA (H): ICD-10-CM

## 2023-11-08 LAB
BASOPHILS # BLD AUTO: 0 10E3/UL (ref 0–0.2)
BASOPHILS NFR BLD AUTO: 1 %
EOSINOPHIL # BLD AUTO: 0.1 10E3/UL (ref 0–0.7)
EOSINOPHIL NFR BLD AUTO: 3 %
ERYTHROCYTE [DISTWIDTH] IN BLOOD BY AUTOMATED COUNT: 13.1 % (ref 10–15)
HCT VFR BLD AUTO: 48.1 % (ref 40–53)
HGB BLD-MCNC: 16 G/DL (ref 13.3–17.7)
IMM GRANULOCYTES # BLD: 0 10E3/UL
IMM GRANULOCYTES NFR BLD: 0 %
LYMPHOCYTES # BLD AUTO: 1.4 10E3/UL (ref 0.8–5.3)
LYMPHOCYTES NFR BLD AUTO: 28 %
MCH RBC QN AUTO: 28.3 PG (ref 26.5–33)
MCHC RBC AUTO-ENTMCNC: 33.3 G/DL (ref 31.5–36.5)
MCV RBC AUTO: 85 FL (ref 78–100)
MONOCYTES # BLD AUTO: 0.6 10E3/UL (ref 0–1.3)
MONOCYTES NFR BLD AUTO: 12 %
NEUTROPHILS # BLD AUTO: 2.9 10E3/UL (ref 1.6–8.3)
NEUTROPHILS NFR BLD AUTO: 56 %
NRBC # BLD AUTO: 0 10E3/UL
NRBC BLD AUTO-RTO: 0 /100
PLATELET # BLD AUTO: 150 10E3/UL (ref 150–450)
RBC # BLD AUTO: 5.66 10E6/UL (ref 4.4–5.9)
WBC # BLD AUTO: 5.2 10E3/UL (ref 4–11)

## 2023-11-08 PROCEDURE — 36415 COLL VENOUS BLD VENIPUNCTURE: CPT

## 2023-11-08 PROCEDURE — 85025 COMPLETE CBC W/AUTO DIFF WBC: CPT

## 2023-12-16 DIAGNOSIS — D69.3 IDIOPATHIC THROMBOCYTOPENIC PURPURA (H): ICD-10-CM

## 2023-12-18 NOTE — TELEPHONE ENCOUNTER
"Prednisone Refill   Last prescribing provider: Dr Wright     Last clinic visit date: 9/13/23 Dr Wright     Recommendations for requested medication (if none, N/A): Copied from chart note   9/13/23 DR Wright   \"1) ITP- presented with platelet count of 1k on 10/20/17. Responsive to prednisone and IvIg. In remission for >2 years, relapsed 5/16/20, and again 10/29/20. Rituximab x4 Dec 2020. \"     Any other pertinent information (if none, N/A): N/A    Refilled: Y/N, if NO, why?   " ADMIT

## 2023-12-19 RX ORDER — PREDNISONE 10 MG/1
10 TABLET ORAL DAILY
Qty: 30 TABLET | Refills: 1 | Status: SHIPPED | OUTPATIENT
Start: 2023-12-19 | End: 2023-12-22

## 2023-12-21 ENCOUNTER — LAB (OUTPATIENT)
Dept: LAB | Facility: CLINIC | Age: 33
End: 2023-12-21
Payer: COMMERCIAL

## 2023-12-21 DIAGNOSIS — D69.3 IDIOPATHIC THROMBOCYTOPENIC PURPURA (H): ICD-10-CM

## 2023-12-21 LAB
BASOPHILS # BLD AUTO: 0 10E3/UL (ref 0–0.2)
BASOPHILS NFR BLD AUTO: 1 %
EOSINOPHIL # BLD AUTO: 0.2 10E3/UL (ref 0–0.7)
EOSINOPHIL NFR BLD AUTO: 3 %
ERYTHROCYTE [DISTWIDTH] IN BLOOD BY AUTOMATED COUNT: 13.3 % (ref 10–15)
HCT VFR BLD AUTO: 46.9 % (ref 40–53)
HGB BLD-MCNC: 16.4 G/DL (ref 13.3–17.7)
IMM GRANULOCYTES # BLD: 0 10E3/UL
IMM GRANULOCYTES NFR BLD: 0 %
LYMPHOCYTES # BLD AUTO: 2 10E3/UL (ref 0.8–5.3)
LYMPHOCYTES NFR BLD AUTO: 37 %
MCH RBC QN AUTO: 29.7 PG (ref 26.5–33)
MCHC RBC AUTO-ENTMCNC: 35 G/DL (ref 31.5–36.5)
MCV RBC AUTO: 85 FL (ref 78–100)
MONOCYTES # BLD AUTO: 0.6 10E3/UL (ref 0–1.3)
MONOCYTES NFR BLD AUTO: 10 %
NEUTROPHILS # BLD AUTO: 2.6 10E3/UL (ref 1.6–8.3)
NEUTROPHILS NFR BLD AUTO: 49 %
NRBC # BLD AUTO: 0 10E3/UL
NRBC BLD AUTO-RTO: 0 /100
PLATELET # BLD AUTO: 199 10E3/UL (ref 150–450)
RBC # BLD AUTO: 5.52 10E6/UL (ref 4.4–5.9)
WBC # BLD AUTO: 5.3 10E3/UL (ref 4–11)

## 2023-12-21 PROCEDURE — 85025 COMPLETE CBC W/AUTO DIFF WBC: CPT

## 2023-12-21 PROCEDURE — 36415 COLL VENOUS BLD VENIPUNCTURE: CPT

## 2023-12-21 NOTE — RESULT ENCOUNTER NOTE
Kim Mejia,-    Here are my comments about the recent results: The platelet count is good!  I recommend that you decrease the prednisone to 5 mg ( 1/2 tab) by  mouth daily. Recheck CBC in a month. Let me know if you are having any bruising, bleeding symptoms. Lidia La and Happy New Year!    Regards,   Madelin Wright MD

## 2023-12-22 DIAGNOSIS — D69.3 IDIOPATHIC THROMBOCYTOPENIC PURPURA (H): ICD-10-CM

## 2023-12-22 RX ORDER — PREDNISONE 5 MG/1
5 TABLET ORAL DAILY
Qty: 30 TABLET | Refills: 1 | Status: SHIPPED | OUTPATIENT
Start: 2023-12-22 | End: 2024-02-23

## 2024-02-13 ENCOUNTER — E-VISIT (OUTPATIENT)
Dept: FAMILY MEDICINE | Facility: CLINIC | Age: 34
End: 2024-02-13
Payer: COMMERCIAL

## 2024-02-13 DIAGNOSIS — J06.9 VIRAL URI: Primary | ICD-10-CM

## 2024-02-13 PROCEDURE — 99423 OL DIG E/M SVC 21+ MIN: CPT | Performed by: INTERNAL MEDICINE

## 2024-02-13 RX ORDER — OSELTAMIVIR PHOSPHATE 75 MG/1
75 CAPSULE ORAL 2 TIMES DAILY
Qty: 10 CAPSULE | Refills: 0 | Status: SHIPPED | OUTPATIENT
Start: 2024-02-13 | End: 2024-02-18

## 2024-02-13 NOTE — TELEPHONE ENCOUNTER
Provider E-Visit time total (minutes): 22 minutes      Chief Complaint:     E-visit for flu symptoms    HPI:   Patient Roberto Spence is a very pleasant 33 year old male who presents to Internal Medicine clinic today for E-visit for recent flu symptoms.     Current Medications:     Current Outpatient Medications   Medication Sig Dispense Refill    oseltamivir (TAMIFLU) 75 MG capsule Take 1 capsule (75 mg) by mouth 2 times daily for 5 days 10 capsule 0    losartan-hydrochlorothiazide (HYZAAR) 100-12.5 MG tablet Take 1 tablet by mouth daily 90 tablet 3    predniSONE (DELTASONE) 5 MG tablet Take 1 tablet (5 mg) by mouth daily 30 tablet 1         Allergies:      Allergies   Allergen Reactions    Gluten Meal Unknown    Nsaids Unknown     Has low platelets            Past Medical History:     Past Medical History:   Diagnosis Date    ADHD     Celiac disease 06/10/2015    Depression     Iron deficiency     Platelets decreased (H) 06/10/2015    lower than average per patient report         Past Surgical History:     Past Surgical History:   Procedure Laterality Date    NO HISTORY OF SURGERY           Family Medical History:     Family History   Problem Relation Age of Onset    Coronary Artery Disease Father     Thyroid Disease Father     Known Genetic Syndrome Brother         autisism    Chemical Addiction Paternal Grandfather     Family History Negative Mother     Diabetes No family hx of     Hypertension No family hx of     Hyperlipidemia No family hx of     Cerebrovascular Disease No family hx of     Breast Cancer No family hx of     Colon Cancer No family hx of     Prostate Cancer No family hx of     Other Cancer No family hx of     Depression No family hx of     Anxiety Disorder No family hx of     Mental Illness No family hx of     Substance Abuse No family hx of     Anesthesia Reaction No family hx of     Asthma No family hx of     Osteoporosis No family hx of     Genetic Disorder No family hx of     Obesity No family  hx of     Unknown/Adopted No family hx of          Social History:     Social History     Socioeconomic History    Marital status:      Spouse name: Not on file    Number of children: Not on file    Years of education: Not on file    Highest education level: Not on file   Occupational History    Not on file   Tobacco Use    Smoking status: Never    Smokeless tobacco: Never   Vaping Use    Vaping Use: Never used   Substance and Sexual Activity    Alcohol use: Yes     Alcohol/week: 0.0 standard drinks of alcohol    Drug use: No    Sexual activity: Yes     Partners: Female   Other Topics Concern    Parent/sibling w/ CABG, MI or angioplasty before 65F 55M? No   Social History Narrative    Not on file     Social Determinants of Health     Financial Resource Strain: Not on file   Food Insecurity: Not on file   Transportation Needs: Not on file   Physical Activity: Not on file   Stress: Not on file   Social Connections: Not on file   Interpersonal Safety: Not on file   Housing Stability: Not on file           Review of System:       Ears/Nose/Throat: positive for nasal congestion, sore throat  Respiratory: positive for recent flu symptoms  Behavioral: Negative for tobacco use       Physical Exam:   There were no vitals taken for this visit.        Diagnostic Test Results:     Diagnostic Test Results:  Labs reviewed in Epic    ASSESSMENT/PLAN:       Roberto was seen today for other.    Diagnoses and all orders for this visit:    Viral URI  -     oseltamivir (TAMIFLU) 75 MG capsule; Take 1 capsule (75 mg) by mouth 2 times daily for 5 days            Follow Up Plan:     Patient is instructed to return to Internal Medicine clinic for follow-up visit in 1 week.        Joy Ruby MD  Internal Medicine  [facility]

## 2024-02-15 DIAGNOSIS — D69.3 IDIOPATHIC THROMBOCYTOPENIC PURPURA (H): ICD-10-CM

## 2024-02-15 NOTE — TELEPHONE ENCOUNTER
Prednisone 5mg tab  Last prescribing provider: Dr. Madelin Wright    Last clinic visit date: 9/13/23    Recommendations for requested medication (if none, N/A): NA    Any other pertinent information (if none, N/A): NA    Refilled: Y/N, if NO, why?

## 2024-02-23 RX ORDER — PREDNISONE 5 MG/1
5 TABLET ORAL DAILY
Qty: 30 TABLET | Refills: 1 | Status: SHIPPED | OUTPATIENT
Start: 2024-02-23 | End: 2024-03-01

## 2024-03-01 ENCOUNTER — LAB (OUTPATIENT)
Dept: LAB | Facility: CLINIC | Age: 34
End: 2024-03-01
Payer: COMMERCIAL

## 2024-03-01 DIAGNOSIS — D69.3 IDIOPATHIC THROMBOCYTOPENIC PURPURA (H): ICD-10-CM

## 2024-03-01 LAB
BASOPHILS # BLD AUTO: 0 10E3/UL (ref 0–0.2)
BASOPHILS NFR BLD AUTO: 1 %
EOSINOPHIL # BLD AUTO: 0.1 10E3/UL (ref 0–0.7)
EOSINOPHIL NFR BLD AUTO: 2 %
ERYTHROCYTE [DISTWIDTH] IN BLOOD BY AUTOMATED COUNT: 13.4 % (ref 10–15)
HCT VFR BLD AUTO: 45.3 % (ref 40–53)
HGB BLD-MCNC: 14.8 G/DL (ref 13.3–17.7)
IMM GRANULOCYTES # BLD: 0 10E3/UL
IMM GRANULOCYTES NFR BLD: 0 %
LYMPHOCYTES # BLD AUTO: 1.6 10E3/UL (ref 0.8–5.3)
LYMPHOCYTES NFR BLD AUTO: 34 %
MCH RBC QN AUTO: 28.7 PG (ref 26.5–33)
MCHC RBC AUTO-ENTMCNC: 32.7 G/DL (ref 31.5–36.5)
MCV RBC AUTO: 88 FL (ref 78–100)
MONOCYTES # BLD AUTO: 0.5 10E3/UL (ref 0–1.3)
MONOCYTES NFR BLD AUTO: 10 %
NEUTROPHILS # BLD AUTO: 2.5 10E3/UL (ref 1.6–8.3)
NEUTROPHILS NFR BLD AUTO: 53 %
PLATELET # BLD AUTO: 253 10E3/UL (ref 150–450)
RBC # BLD AUTO: 5.16 10E6/UL (ref 4.4–5.9)
WBC # BLD AUTO: 4.7 10E3/UL (ref 4–11)

## 2024-03-01 PROCEDURE — 85025 COMPLETE CBC W/AUTO DIFF WBC: CPT

## 2024-03-01 PROCEDURE — 36415 COLL VENOUS BLD VENIPUNCTURE: CPT

## 2024-03-01 NOTE — RESULT ENCOUNTER NOTE
Hello -    Here are my comments about the recent results: Good news-Platelets are great. You can stop the prednisone. Contact us if you are having any bruising, bleeding.    Regards,   Madelin Wright MD

## 2024-06-04 ENCOUNTER — LAB (OUTPATIENT)
Dept: LAB | Facility: CLINIC | Age: 34
End: 2024-06-04
Payer: COMMERCIAL

## 2024-06-04 DIAGNOSIS — D69.3 IDIOPATHIC THROMBOCYTOPENIC PURPURA (H): ICD-10-CM

## 2024-06-04 LAB
BASOPHILS # BLD AUTO: 0 10E3/UL (ref 0–0.2)
BASOPHILS NFR BLD AUTO: 1 %
EOSINOPHIL # BLD AUTO: 0.3 10E3/UL (ref 0–0.7)
EOSINOPHIL NFR BLD AUTO: 3 %
ERYTHROCYTE [DISTWIDTH] IN BLOOD BY AUTOMATED COUNT: 12.8 % (ref 10–15)
HCT VFR BLD AUTO: 50.1 % (ref 40–53)
HGB BLD-MCNC: 16.8 G/DL (ref 13.3–17.7)
IMM GRANULOCYTES # BLD: 0 10E3/UL
IMM GRANULOCYTES NFR BLD: 0 %
LYMPHOCYTES # BLD AUTO: 2 10E3/UL (ref 0.8–5.3)
LYMPHOCYTES NFR BLD AUTO: 25 %
MCH RBC QN AUTO: 28 PG (ref 26.5–33)
MCHC RBC AUTO-ENTMCNC: 33.5 G/DL (ref 31.5–36.5)
MCV RBC AUTO: 84 FL (ref 78–100)
MONOCYTES # BLD AUTO: 0.6 10E3/UL (ref 0–1.3)
MONOCYTES NFR BLD AUTO: 7 %
NEUTROPHILS # BLD AUTO: 5.3 10E3/UL (ref 1.6–8.3)
NEUTROPHILS NFR BLD AUTO: 64 %
PLATELET # BLD AUTO: 236 10E3/UL (ref 150–450)
RBC # BLD AUTO: 5.99 10E6/UL (ref 4.4–5.9)
WBC # BLD AUTO: 8.2 10E3/UL (ref 4–11)

## 2024-06-04 PROCEDURE — 85025 COMPLETE CBC W/AUTO DIFF WBC: CPT

## 2024-06-04 PROCEDURE — 36415 COLL VENOUS BLD VENIPUNCTURE: CPT

## 2024-07-07 ENCOUNTER — HEALTH MAINTENANCE LETTER (OUTPATIENT)
Age: 34
End: 2024-07-07

## 2024-08-22 ENCOUNTER — APPOINTMENT (OUTPATIENT)
Dept: LAB | Facility: CLINIC | Age: 34
End: 2024-08-22
Payer: COMMERCIAL

## 2024-08-22 ENCOUNTER — ONCOLOGY VISIT (OUTPATIENT)
Dept: ONCOLOGY | Facility: CLINIC | Age: 34
End: 2024-08-22
Attending: INTERNAL MEDICINE
Payer: COMMERCIAL

## 2024-08-22 VITALS
SYSTOLIC BLOOD PRESSURE: 139 MMHG | TEMPERATURE: 97.5 F | HEART RATE: 71 BPM | OXYGEN SATURATION: 99 % | DIASTOLIC BLOOD PRESSURE: 95 MMHG | BODY MASS INDEX: 31.45 KG/M2 | WEIGHT: 225.5 LBS | RESPIRATION RATE: 18 BRPM

## 2024-08-22 DIAGNOSIS — D69.3 IDIOPATHIC THROMBOCYTOPENIC PURPURA (H): ICD-10-CM

## 2024-08-22 LAB
BASOPHILS # BLD AUTO: 0 10E3/UL (ref 0–0.2)
BASOPHILS NFR BLD AUTO: 1 %
EOSINOPHIL # BLD AUTO: 0.3 10E3/UL (ref 0–0.7)
EOSINOPHIL NFR BLD AUTO: 6 %
ERYTHROCYTE [DISTWIDTH] IN BLOOD BY AUTOMATED COUNT: 13.2 % (ref 10–15)
HCT VFR BLD AUTO: 46.8 % (ref 40–53)
HGB BLD-MCNC: 16.3 G/DL (ref 13.3–17.7)
IMM GRANULOCYTES # BLD: 0 10E3/UL
IMM GRANULOCYTES NFR BLD: 0 %
LYMPHOCYTES # BLD AUTO: 1.9 10E3/UL (ref 0.8–5.3)
LYMPHOCYTES NFR BLD AUTO: 40 %
MCH RBC QN AUTO: 29.6 PG (ref 26.5–33)
MCHC RBC AUTO-ENTMCNC: 34.8 G/DL (ref 31.5–36.5)
MCV RBC AUTO: 85 FL (ref 78–100)
MONOCYTES # BLD AUTO: 0.7 10E3/UL (ref 0–1.3)
MONOCYTES NFR BLD AUTO: 15 %
NEUTROPHILS # BLD AUTO: 1.8 10E3/UL (ref 1.6–8.3)
NEUTROPHILS NFR BLD AUTO: 38 %
NRBC # BLD AUTO: 0 10E3/UL
NRBC BLD AUTO-RTO: 0 /100
PLATELET # BLD AUTO: 224 10E3/UL (ref 150–450)
RBC # BLD AUTO: 5.5 10E6/UL (ref 4.4–5.9)
WBC # BLD AUTO: 4.7 10E3/UL (ref 4–11)

## 2024-08-22 PROCEDURE — 99213 OFFICE O/P EST LOW 20 MIN: CPT | Performed by: INTERNAL MEDICINE

## 2024-08-22 PROCEDURE — 36415 COLL VENOUS BLD VENIPUNCTURE: CPT | Performed by: INTERNAL MEDICINE

## 2024-08-22 PROCEDURE — 85025 COMPLETE CBC W/AUTO DIFF WBC: CPT | Performed by: INTERNAL MEDICINE

## 2024-08-22 ASSESSMENT — PAIN SCALES - GENERAL: PAINLEVEL: NO PAIN (0)

## 2024-08-22 NOTE — PROGRESS NOTES
Hematology Clinic visit  In person     PROBLEM LIST:  1) ITP- presented with platelet count of 1k on 10/20/17. Responsive to prednisone and IvIg. In remission for >2 years, relapsed 5/16/20, and again 10/29/20. Rituximab x4 Dec 2020.  Relapse September 2023, given rituximab x 4 and steroids.  2) leukopenia, thrombocytopenia since at least 2012- Bone marrow bx on 12/5/12 show hypocellular marrow 20-40%, no dysplasia, normal cytogenetics, normal flow cytometry.   2) h/o mild iron deficiency  3) anxiety  4) Celiac disease, diagnosed June 2015      Interim History: Mr. Spence is here for follow-up of ITP.  Last visit was 9/13/23.  He had a relapse related to an upper respiratory infection with platelets dropping to 18K.  He was started on prednisone 50 mg daily.  He was given rituximab 375 mg/m  weekly x 4 on 10/10 - 10/31/2023.  He was weaned off prednisone by March 2024,    He is feeling well No bleeding symptoms. Only med is for blood pressure. He follows a strict gluten free diet.     He works form home, has two young children.       PHYSICAL EXAMINATION:  BP (!) 139/95   Pulse 71   Temp 97.5  F (36.4  C) (Oral)   Resp 18   Wt 102.3 kg (225 lb 8 oz)   SpO2 99%   BMI 31.45 kg/m      General appearance:  Patient is 34 year old in no acute distress.     HEENT:  No pallor, icterus, or mucositis.    Lymph nodes:  No cervical, supraclavicular, axillary lymphadenopathy.   Lungs:  Clear to auscultation bilaterally.   Heart:  Regular rate and rhythm; no S3 S4 or murmer.     Abdomen:  Positive bowel sounds, soft and nontender, nondistended.  No hepatomegaly. No splenomegaly appreciated.    Extremities:  No joint swelling or tenderness.  No ankle edema.     Skin:  No rash, no petechiae or ecchymoses.    Labs:   Latest Reference Range & Units 06/04/24 15:24 08/22/24 06:45   WBC 4.0 - 11.0 10e3/uL 8.2 4.7   Hemoglobin 13.3 - 17.7 g/dL 16.8 16.3   Hematocrit 40.0 - 53.0 % 50.1 46.8   Platelet Count 150 - 450 10e3/uL 236 224    RBC Count 4.40 - 5.90 10e6/uL 5.99 (H) 5.50   MCV 78 - 100 fL 84 85   MCH 26.5 - 33.0 pg 28.0 29.6   MCHC 31.5 - 36.5 g/dL 33.5 34.8   RDW 10.0 - 15.0 % 12.8 13.2   % Neutrophils % 64 38   % Lymphocytes % 25 40   % Monocytes % 7 15   % Eosinophils % 3 6   % Basophils % 1 1   Absolute Basophils 0.0 - 0.2 10e3/uL 0.0 0.0   Absolute Eosinophils 0.0 - 0.7 10e3/uL 0.3 0.3   Absolute Immature Granulocytes <=0.4 10e3/uL 0.0 0.0   Absolute Lymphocytes 0.8 - 5.3 10e3/uL 2.0 1.9   Absolute Monocytes 0.0 - 1.3 10e3/uL 0.6 0.7   % Immature Granulocytes % 0 0   Absolute Neutrophils 1.6 - 8.3 10e3/uL 5.3 1.8   (H): Data is abnormally high    Assessment and Recommendation:     # ITP-has had recurrent relapses.  There is the possibility of using eltrombopag, but less optimal because he would require daily pill. Discussed if he has petechiae, bruising he shoud contact us and go in for labs.   -CBC check q3 months and as needed- standing order placed.      #  Vaccinations-he should have the influenza and updated COVID-vaccine sometime in late September or early October when they are available.     # Hypertension- adjusting meds through PCP      RTC 12 months with Kyle     Time: I spent a total of 20 minutes on the day of the visit. Please see the note for further information on patient assessment and treatment.      Madelin Wright MD

## 2024-08-22 NOTE — NURSING NOTE
Chief Complaint   Patient presents with    Blood Draw     Labs drawn via  by RN.      Labs drawn with  by RN. Vitals taken. Patient checked into next appointment.    Francie Powell RN

## 2024-08-22 NOTE — NURSING NOTE
"Oncology Rooming Note    August 22, 2024 7:00 AM   Roberto Spence is a 34 year old male who presents for:    Chief Complaint   Patient presents with    Blood Draw     Labs drawn via  by RN.     Oncology Clinic Visit     Thrombocytopenia      Initial Vitals: BP (!) 139/95   Pulse 71   Temp 97.5  F (36.4  C) (Oral)   Resp 18   Wt 102.3 kg (225 lb 8 oz)   SpO2 99%   BMI 31.45 kg/m   Estimated body mass index is 31.45 kg/m  as calculated from the following:    Height as of 10/10/23: 1.803 m (5' 11\").    Weight as of this encounter: 102.3 kg (225 lb 8 oz). Body surface area is 2.26 meters squared.  No Pain (0) Comment: Data Unavailable   No LMP for male patient.  Allergies reviewed: Yes  Medications reviewed: Yes    Medications: Medication refills not needed today.  Pharmacy name entered into [email or web address]    [id number] [place] [facility] [address] [facility] [address] [facility]  [id number] [place] [address]  [id number] [place], [place] - [address] [facility] -- [place], [place] - [address]  [id number] [place] - [address]    Frailty Screening:   Is the patient here for a new oncology consult visit in cancer care? 2. No      Clinical concerns: none      Jory Shabazz              "

## 2024-08-22 NOTE — LETTER
8/22/2024      Roberto Spence  [address] [facility] [address]      Dear Colleague,    Thank you for referring your patient, Roberto Spence, to the [facility]. Please see a copy of my visit note below.    Hematology Clinic visit  In person     PROBLEM LIST:  1) ITP- presented with platelet count of 1k on 10/20/17. Responsive to prednisone and IvIg. In remission for >2 years, relapsed 5/16/20, and again 10/29/20. Rituximab x4 Dec 2020.  Relapse September 2023, given rituximab x 4 and steroids.  2) leukopenia, thrombocytopenia since at least 2012- Bone marrow bx on 12/5/12 show hypocellular marrow 20-40%, no dysplasia, normal cytogenetics, normal flow cytometry.   2) h/o mild iron deficiency  3) anxiety  4) Celiac disease, diagnosed June 2015      Interim History: Mr. Spence is here for follow-up of ITP.  Last visit was 9/13/23.  He had a relapse related to an upper respiratory infection with platelets dropping to 18K.  He was started on prednisone 50 mg daily.  He was given rituximab 375 mg/m  weekly x 4 on 10/10 - 10/31/2023.  He was weaned off prednisone by March 2024,    He is feeling well No bleeding symptoms. Only med is for blood pressure. He follows a strict gluten free diet.     He works form home, has two young children.       PHYSICAL EXAMINATION:  BP (!) 139/95   Pulse 71   Temp 97.5  F (36.4  C) (Oral)   Resp 18   Wt 102.3 kg (225 lb 8 oz)   SpO2 99%   BMI 31.45 kg/m      General appearance:  Patient is 34 year old in no acute distress.     HEENT:  No pallor, icterus, or mucositis.    Lymph nodes:  No cervical, supraclavicular, axillary lymphadenopathy.   Lungs:  Clear to auscultation bilaterally.   Heart:  Regular rate and rhythm; no S3 S4 or murmer.     Abdomen:  Positive bowel sounds, soft and nontender, nondistended.  No hepatomegaly. No splenomegaly appreciated.    Extremities:  No joint swelling or tenderness.  No ankle edema.     Skin:  No rash, no  petechiae or ecchymoses.    Labs:   Latest Reference Range & Units 06/04/24 15:24 08/22/24 06:45   WBC 4.0 - 11.0 10e3/uL 8.2 4.7   Hemoglobin 13.3 - 17.7 g/dL 16.8 16.3   Hematocrit 40.0 - 53.0 % 50.1 46.8   Platelet Count 150 - 450 10e3/uL 236 224   RBC Count 4.40 - 5.90 10e6/uL 5.99 (H) 5.50   MCV 78 - 100 fL 84 85   MCH 26.5 - 33.0 pg 28.0 29.6   MCHC 31.5 - 36.5 g/dL 33.5 34.8   RDW 10.0 - 15.0 % 12.8 13.2   % Neutrophils % 64 38   % Lymphocytes % 25 40   % Monocytes % 7 15   % Eosinophils % 3 6   % Basophils % 1 1   Absolute Basophils 0.0 - 0.2 10e3/uL 0.0 0.0   Absolute Eosinophils 0.0 - 0.7 10e3/uL 0.3 0.3   Absolute Immature Granulocytes <=0.4 10e3/uL 0.0 0.0   Absolute Lymphocytes 0.8 - 5.3 10e3/uL 2.0 1.9   Absolute Monocytes 0.0 - 1.3 10e3/uL 0.6 0.7   % Immature Granulocytes % 0 0   Absolute Neutrophils 1.6 - 8.3 10e3/uL 5.3 1.8   (H): Data is abnormally high    Assessment and Recommendation:     # ITP-has had recurrent relapses.  There is the possibility of using eltrombopag, but less optimal because he would require daily pill. Discussed if he has petechiae, bruising he shoud contact us and go in for labs.   -CBC check q3 months and as needed- standing order placed.      #  Vaccinations-he should have the influenza and updated COVID-vaccine sometime in late September or early October when they are available.     # Hypertension- adjusting meds through PCP      RTC 12 months with Kyle     Time: I spent a total of 20 minutes on the day of the visit. Please see the note for further information on patient assessment and treatment.      Madelin Wright MD      Again, thank you for allowing me to participate in the care of your patient.        Sincerely,        Madelin Wright MD

## 2024-09-10 ENCOUNTER — PATIENT OUTREACH (OUTPATIENT)
Dept: ONCOLOGY | Facility: CLINIC | Age: 34
End: 2024-09-10
Payer: COMMERCIAL

## 2024-09-10 NOTE — PROGRESS NOTES
[facility]: Cancer Care                                                                                          Completed chart audit to update Oncology Care Coordination enrollment status.  Reviewed POC and pt has appropriate follow up scheduled.       Signature:  Brigette Bunch RN

## 2024-10-08 ENCOUNTER — MYC REFILL (OUTPATIENT)
Dept: FAMILY MEDICINE | Facility: CLINIC | Age: 34
End: 2024-10-08
Payer: COMMERCIAL

## 2024-10-08 DIAGNOSIS — I10 BENIGN ESSENTIAL HYPERTENSION: ICD-10-CM

## 2024-10-08 RX ORDER — LOSARTAN POTASSIUM AND HYDROCHLOROTHIAZIDE 12.5; 1 MG/1; MG/1
1 TABLET ORAL DAILY
Qty: 90 TABLET | Refills: 1 | Status: SHIPPED | OUTPATIENT
Start: 2024-10-08

## 2025-03-10 DIAGNOSIS — I10 BENIGN ESSENTIAL HYPERTENSION: ICD-10-CM

## 2025-03-10 RX ORDER — LOSARTAN POTASSIUM AND HYDROCHLOROTHIAZIDE 12.5; 1 MG/1; MG/1
1 TABLET ORAL DAILY
Qty: 90 TABLET | Refills: 1 | OUTPATIENT
Start: 2025-03-10

## 2025-03-13 NOTE — TELEPHONE ENCOUNTER
03- RM. Spoke with pt and he [facility]

## 2025-03-24 ENCOUNTER — VIRTUAL VISIT (OUTPATIENT)
Dept: FAMILY MEDICINE | Facility: CLINIC | Age: 35
End: 2025-03-24
Payer: COMMERCIAL

## 2025-03-24 DIAGNOSIS — I10 BENIGN ESSENTIAL HYPERTENSION: Primary | ICD-10-CM

## 2025-03-24 DIAGNOSIS — D69.3 IDIOPATHIC THROMBOCYTOPENIC PURPURA (H): ICD-10-CM

## 2025-03-24 PROCEDURE — 1126F AMNT PAIN NOTED NONE PRSNT: CPT | Mod: 95 | Performed by: INTERNAL MEDICINE

## 2025-03-24 PROCEDURE — 98005 SYNCH AUDIO-VIDEO EST LOW 20: CPT | Performed by: INTERNAL MEDICINE

## 2025-03-24 RX ORDER — LOSARTAN POTASSIUM AND HYDROCHLOROTHIAZIDE 12.5; 1 MG/1; MG/1
1 TABLET ORAL DAILY
Qty: 90 TABLET | Refills: 3 | Status: SHIPPED | OUTPATIENT
Start: 2025-03-24

## 2025-03-24 NOTE — PROGRESS NOTES
Roberto is a 34 year old who is being evaluated via a billable video visit.    How would you like to obtain your AVS? MyChart  If the video visit is dropped, the invitation should be resent by: Text to cell phone: [phone]  Will anyone else be joining your video visit? No      Assessment & Plan     Idiopathic thrombocytopenic purpura (H)  - stable  - continue current therapy  - REVIEW OF HEALTH MAINTENANCE PROTOCOL ORDERS    Benign essential hypertension  - stable  - medication refilled today for  losartan-hydrochlorothiazide (HYZAAR) 100-12.5 MG tablet  Dispense: 90 tablet; Refill: 3              FUTURE APPOINTMENTS:       - Follow-up visit in 6 to 12 months    Subjective   Roberto is a 34 year old, presenting for the following health issues:  Recheck Medication    History of Present Illness       Reason for visit:  BP medication follow up   He is taking medications regularly.        Current Outpatient Medications   Medication Sig Dispense Refill    losartan-hydrochlorothiazide (HYZAAR) 100-12.5 MG tablet Take 1 tablet by mouth daily. 90 tablet 3     No current facility-administered medications for this visit.     Past Medical History:   Diagnosis Date    ADHD     Celiac disease 06/10/2015    Depression     Iron deficiency     Platelets decreased 06/10/2015    lower than average per patient report     Social History     Socioeconomic History    Marital status:      Spouse name: Not on file    Number of children: Not on file    Years of education: Not on file    Highest education level: Not on file   Occupational History    Not on file   Tobacco Use    Smoking status: Never    Smokeless tobacco: Never   Vaping Use    Vaping status: Never Used   Substance and Sexual Activity    Alcohol use: Yes     Alcohol/week: 0.0 standard drinks of alcohol    Drug use: No    Sexual activity: Yes     Partners: Female   Other Topics Concern    Parent/sibling w/ CABG, MI or angioplasty before 65F 55M? No   Social History  Narrative    Not on file     Social Drivers of Health     Financial Resource Strain: Low Risk  (8/23/2023)    Received from [facility], [facility]    Financial Resource Strain     Difficulty of Paying Living Expenses: 3     Difficulty of Paying Living Expenses: Not on file   Food Insecurity: No Food Insecurity (8/23/2023)    Received from [facility], [facility]    Food Insecurity     Worried About Running Out of Food in the Last Year: 1   Transportation Needs: No Transportation Needs (8/23/2023)    Received from [facility], [facility]    Transportation Needs     Lack of Transportation (Medical): 1   Physical Activity: Not on file   Stress: Not on file   Social Connections: Socially Integrated (8/23/2023)    Received from [facility], [facility]    Social Connections     Frequency of Communication with Friends and Family: 0   Interpersonal Safety: Not on file   Housing Stability: Low Risk  (8/23/2023)    Received from [facility], [facility]    Housing Stability     Unable to Pay for Housing in the Last Year: 1           Review of Systems  Constitutional, HEENT, cardiovascular, pulmonary, GI, , musculoskeletal, neuro, skin, endocrine and psych systems are negative, except as otherwise noted.      Objective    Vitals - Patient Reported  Pain Score: No Pain (0)      Vitals:  No vitals were obtained today due to virtual visit.    Physical Exam   GENERAL: alert and no distress  EYES: Eyes grossly normal to inspection.  No discharge or erythema, or obvious scleral/conjunctival abnormalities.  RESP: No audible wheeze, cough, or visible cyanosis.    SKIN: Visible skin clear. No significant  rash, abnormal pigmentation or lesions.  NEURO: Cranial nerves grossly intact.  Mentation and speech appropriate for age.  PSYCH: Appropriate affect, tone, and pace of words    Labs reviewed in Epic        Video-Visit Details    Type of service:  Video Visit   Originating Location (pt. Location): Home    Distant Location (provider location):  On-site  Platform used for Video Visit: Adrien  Signed Electronically by: oJy Ruby MD

## 2025-04-17 ENCOUNTER — LAB (OUTPATIENT)
Dept: LAB | Facility: CLINIC | Age: 35
End: 2025-04-17
Payer: COMMERCIAL

## 2025-04-17 DIAGNOSIS — D69.3 IDIOPATHIC THROMBOCYTOPENIC PURPURA (H): ICD-10-CM

## 2025-04-17 LAB
BASOPHILS # BLD AUTO: 0.1 10E3/UL (ref 0–0.2)
BASOPHILS NFR BLD AUTO: 1 %
EOSINOPHIL # BLD AUTO: 0.2 10E3/UL (ref 0–0.7)
EOSINOPHIL NFR BLD AUTO: 3 %
ERYTHROCYTE [DISTWIDTH] IN BLOOD BY AUTOMATED COUNT: 13 % (ref 10–15)
HCT VFR BLD AUTO: 49.1 % (ref 40–53)
HGB BLD-MCNC: 16.4 G/DL (ref 13.3–17.7)
IMM GRANULOCYTES # BLD: 0 10E3/UL
IMM GRANULOCYTES NFR BLD: 0 %
LYMPHOCYTES # BLD AUTO: 2.1 10E3/UL (ref 0.8–5.3)
LYMPHOCYTES NFR BLD AUTO: 38 %
MCH RBC QN AUTO: 29.1 PG (ref 26.5–33)
MCHC RBC AUTO-ENTMCNC: 33.4 G/DL (ref 31.5–36.5)
MCV RBC AUTO: 87 FL (ref 78–100)
MONOCYTES # BLD AUTO: 0.7 10E3/UL (ref 0–1.3)
MONOCYTES NFR BLD AUTO: 12 %
NEUTROPHILS # BLD AUTO: 2.5 10E3/UL (ref 1.6–8.3)
NEUTROPHILS NFR BLD AUTO: 46 %
NRBC # BLD AUTO: 0 10E3/UL
NRBC BLD AUTO-RTO: 0 /100
PLATELET # BLD AUTO: 172 10E3/UL (ref 150–450)
RBC # BLD AUTO: 5.63 10E6/UL (ref 4.4–5.9)
WBC # BLD AUTO: 5.5 10E3/UL (ref 4–11)

## 2025-07-19 ENCOUNTER — HEALTH MAINTENANCE LETTER (OUTPATIENT)
Age: 35
End: 2025-07-19

## 2025-08-14 ENCOUNTER — ONCOLOGY VISIT (OUTPATIENT)
Dept: ONCOLOGY | Facility: CLINIC | Age: 35
End: 2025-08-14
Attending: INTERNAL MEDICINE
Payer: COMMERCIAL

## 2025-08-14 ENCOUNTER — APPOINTMENT (OUTPATIENT)
Dept: LAB | Facility: CLINIC | Age: 35
End: 2025-08-14
Payer: COMMERCIAL

## 2025-08-14 VITALS
SYSTOLIC BLOOD PRESSURE: 124 MMHG | TEMPERATURE: 98.3 F | HEART RATE: 68 BPM | DIASTOLIC BLOOD PRESSURE: 83 MMHG | WEIGHT: 230 LBS | OXYGEN SATURATION: 95 % | BODY MASS INDEX: 32.08 KG/M2 | RESPIRATION RATE: 16 BRPM

## 2025-08-14 DIAGNOSIS — D69.3 IDIOPATHIC THROMBOCYTOPENIC PURPURA (H): ICD-10-CM

## 2025-08-14 DIAGNOSIS — K90.0 CELIAC DISEASE: Primary | ICD-10-CM

## 2025-08-14 DIAGNOSIS — D70.9 NEUTROPENIA, UNSPECIFIED TYPE: ICD-10-CM

## 2025-08-14 LAB
BASOPHILS # BLD AUTO: <0.03 10E3/UL (ref 0–0.2)
BASOPHILS NFR BLD AUTO: 0.6 %
EOSINOPHIL # BLD AUTO: 0.15 10E3/UL (ref 0–0.7)
EOSINOPHIL NFR BLD AUTO: 4.5 %
ERYTHROCYTE [DISTWIDTH] IN BLOOD BY AUTOMATED COUNT: 13.2 % (ref 10–15)
HCT VFR BLD AUTO: 46.4 % (ref 40–53)
HGB BLD-MCNC: 15.7 G/DL (ref 13.3–17.7)
IMM GRANULOCYTES # BLD: <0.03 10E3/UL
IMM GRANULOCYTES NFR BLD: 0.6 %
LYMPHOCYTES # BLD AUTO: 1.48 10E3/UL (ref 0.8–5.3)
LYMPHOCYTES NFR BLD AUTO: 43.9 %
MCH RBC QN AUTO: 28.2 PG (ref 26.5–33)
MCHC RBC AUTO-ENTMCNC: 33.8 G/DL (ref 31.5–36.5)
MCV RBC AUTO: 83.3 FL (ref 78–100)
MONOCYTES # BLD AUTO: 0.36 10E3/UL (ref 0–1.3)
MONOCYTES NFR BLD AUTO: 10.7 %
NEUTROPHILS # BLD AUTO: 1.34 10E3/UL (ref 1.6–8.3)
NEUTROPHILS NFR BLD AUTO: 39.7 %
NRBC # BLD AUTO: <0.03 10E3/UL
NRBC BLD AUTO-RTO: 0 /100
PLATELET # BLD AUTO: 144 10E3/UL (ref 150–450)
RBC # BLD AUTO: 5.57 10E6/UL (ref 4.4–5.9)
WBC # BLD AUTO: 3.37 10E3/UL (ref 4–11)

## 2025-08-14 PROCEDURE — 85048 AUTOMATED LEUKOCYTE COUNT: CPT | Performed by: INTERNAL MEDICINE

## 2025-08-14 PROCEDURE — 99213 OFFICE O/P EST LOW 20 MIN: CPT | Performed by: INTERNAL MEDICINE

## 2025-08-14 PROCEDURE — 36415 COLL VENOUS BLD VENIPUNCTURE: CPT | Performed by: INTERNAL MEDICINE

## 2025-08-14 ASSESSMENT — PAIN SCALES - GENERAL: PAINLEVEL_OUTOF10: NO PAIN (0)

## 2025-09-02 ENCOUNTER — MYC REFILL (OUTPATIENT)
Dept: FAMILY MEDICINE | Facility: CLINIC | Age: 35
End: 2025-09-02
Payer: COMMERCIAL

## 2025-09-02 DIAGNOSIS — I10 BENIGN ESSENTIAL HYPERTENSION: ICD-10-CM

## 2025-09-04 ENCOUNTER — PATIENT OUTREACH (OUTPATIENT)
Dept: ONCOLOGY | Facility: CLINIC | Age: 35
End: 2025-09-04
Payer: COMMERCIAL

## 2025-09-04 RX ORDER — LOSARTAN POTASSIUM AND HYDROCHLOROTHIAZIDE 12.5; 1 MG/1; MG/1
1 TABLET ORAL DAILY
Qty: 90 TABLET | Refills: 1 | Status: SHIPPED | OUTPATIENT
Start: 2025-09-04